# Patient Record
Sex: FEMALE | Race: BLACK OR AFRICAN AMERICAN | NOT HISPANIC OR LATINO
[De-identification: names, ages, dates, MRNs, and addresses within clinical notes are randomized per-mention and may not be internally consistent; named-entity substitution may affect disease eponyms.]

---

## 2020-09-03 ENCOUNTER — APPOINTMENT (OUTPATIENT)
Dept: PULMONOLOGY | Facility: CLINIC | Age: 80
End: 2020-09-03
Payer: COMMERCIAL

## 2020-09-03 VITALS
OXYGEN SATURATION: 100 % | TEMPERATURE: 98.6 F | HEART RATE: 111 BPM | RESPIRATION RATE: 16 BRPM | SYSTOLIC BLOOD PRESSURE: 120 MMHG | DIASTOLIC BLOOD PRESSURE: 80 MMHG

## 2020-09-03 PROBLEM — Z00.00 ENCOUNTER FOR PREVENTIVE HEALTH EXAMINATION: Status: ACTIVE | Noted: 2020-09-03

## 2020-09-03 PROCEDURE — 94060 EVALUATION OF WHEEZING: CPT

## 2020-09-03 PROCEDURE — 94727 GAS DIL/WSHOT DETER LNG VOL: CPT

## 2020-09-03 PROCEDURE — 99204 OFFICE O/P NEW MOD 45 MIN: CPT | Mod: 25

## 2020-09-03 PROCEDURE — 94729 DIFFUSING CAPACITY: CPT

## 2020-09-04 RX ORDER — LOSARTAN POTASSIUM 100 MG/1
100 TABLET, FILM COATED ORAL
Refills: 0 | Status: ACTIVE | COMMUNITY

## 2020-09-04 RX ORDER — INSULIN ASPART 100 [IU]/ML
100 INJECTION, SOLUTION INTRAVENOUS; SUBCUTANEOUS
Refills: 0 | Status: ACTIVE | COMMUNITY

## 2020-09-04 RX ORDER — SODIUM BICARBONATE 650 MG/1
TABLET ORAL
Refills: 0 | Status: ACTIVE | COMMUNITY

## 2020-09-04 RX ORDER — ISOSORBIDE MONONITRATE 60 MG/1
60 TABLET, EXTENDED RELEASE ORAL
Refills: 0 | Status: ACTIVE | COMMUNITY

## 2020-09-04 RX ORDER — ALBUTEROL SULFATE 90 UG/1
108 (90 BASE) AEROSOL, METERED RESPIRATORY (INHALATION)
Refills: 0 | Status: ACTIVE | COMMUNITY

## 2020-09-04 RX ORDER — HYDRALAZINE HYDROCHLORIDE 50 MG/1
50 TABLET ORAL
Refills: 0 | Status: ACTIVE | COMMUNITY

## 2020-09-04 RX ORDER — CALCITRIOL 0.25 UG/1
0.25 CAPSULE, LIQUID FILLED ORAL
Refills: 0 | Status: ACTIVE | COMMUNITY

## 2020-09-04 NOTE — HISTORY OF PRESENT ILLNESS
[Never] : never [TextBox_4] : She is an 80 year old woman with  a history of hypertension, hyperlipidemia, diabetes, ESRD started on HD in July of 2020 and severe aortic stenosis. She is [employer]. \par \par She was admitted to [facility] on 7/23/20 for shortness of breath and renal failure. She was placed on hemodialysis. Echo showed and EF of 55% and severe aortic stenosis. FAUSTINA was being planned. \par \par Presented on 9/3/20 with a cough. At times she produces phlegm. No hemoptysis. No fever, chills or sweats. No sick contacts. Was tested for COVID -19 and was found to be negative. No prior history of pulmonary disease. Never smoked. \par .\par

## 2020-09-04 NOTE — DISCUSSION/SUMMARY
[FreeTextEntry1] : She is an 80 year old woman with  a history of hypertension, hyperlipidemia, diabetes, ESRD started on HD in July of 2020 and severe aortic stenosis. She is Episcopal. She was admitted to [facility] on 7/23/20 for shortness of breath and renal failure. She was placed on hemodialysis. Echo showed and EF of 55% and severe aortic stenosis. FAUSTINA was being planned. \par \par She presented on 9/3/20 with a chronic cough. At times she produces phlegm. No hemoptysis. No fever, chills or sweats. No sick contacts. Was tested for COVID -19 and was found to be negative. No prior history of pulmonary disease. Never smoked. PFT 9/3/20: Spirometry was normal. Moderate restriction. Mild response to bronchodilator noted. CT Chest 7/29/20: Mild global cardiomegaly. Lung parenchyma was clear. No effusions.\par \par There is no evident primary pulmonary process. Her cough could be due to mild increased extra vascular lung water as a consequence to the ESRD and the severe AS. She was advised to continue with the hemodialysis as planned. I will try to get the imaging from [facility]. \par \par Follow up in one month.

## 2020-09-04 NOTE — REVIEW OF SYSTEMS
[Cough] : cough [Anemia] : anemia [Diabetes] : diabetes [Fever] : no fever [Chills] : no chills [Postnasal Drip] : no postnasal drip [Hemoptysis] : no hemoptysis [Sputum] : no sputum [Chest Discomfort] : no chest discomfort [Dyspnea] : no dyspnea [GERD] : no gerd [Arthralgias] : no arthralgias [Rash] : no rash [Seizures] : no seizures [Depression] : no depression

## 2020-09-04 NOTE — PROCEDURE
[FreeTextEntry1] : CT Chest 7/29/20: Mild global cardiomegaly. Lung parenchyma was clear. No effusions. \par \par PFT 9/3/20: Spirometry was normal. Moderate restriction. Mild response to bronchodilator noted.

## 2020-09-04 NOTE — PHYSICAL EXAM
[No Acute Distress] : no acute distress [Normal S1, S2] : normal s1, s2 [No Neck Mass] : no neck mass [No Resp Distress] : no resp distress [Clear to Auscultation Bilaterally] : clear to auscultation bilaterally [No HSM] : no hsm [No Edema] : no edema [Normal Color/ Pigmentation] : normal color/ pigmentation [Oriented x3] : oriented x3

## 2020-09-18 ENCOUNTER — INPATIENT (INPATIENT)
Facility: HOSPITAL | Age: 80
LOS: 5 days | Discharge: HOME | End: 2020-09-24
Attending: INTERNAL MEDICINE | Admitting: INTERNAL MEDICINE
Payer: MEDICARE

## 2020-09-18 VITALS
RESPIRATION RATE: 16 BRPM | DIASTOLIC BLOOD PRESSURE: 61 MMHG | TEMPERATURE: 98 F | SYSTOLIC BLOOD PRESSURE: 132 MMHG | OXYGEN SATURATION: 100 % | HEART RATE: 69 BPM

## 2020-09-18 NOTE — ED PROVIDER NOTE - NS ED ROS FT
General: denies fever, chills  HENT: denies nasal congestion, sore throat, rhinorrhea  Eyes: denies vision changes  CV: denies chest pain  Resp: denies difficulty breathing, cough  Abdominal: denies nausea, vomiting, diarrhea, abdominal pain, blood in stool, dark stool  : denies pain with urination  MSK: +syncope  Neuro: denies headaches, numbness, tingling, dizziness, lightheadedness.  Skin: denies new rashes  Endocrine: denies recent weight loss

## 2020-09-18 NOTE — ED ADULT NURSE NOTE - CHPI ED NUR SYMPTOMS NEG
no tingling/no decreased eating/drinking/no dizziness/no pain/no chills/no fever/no nausea/no vomiting

## 2020-09-18 NOTE — ED PROVIDER NOTE - PROGRESS NOTE DETAILS
JOAN: I was signed out this pt pending Labs and imaging for this 81 yo F presenting with syncope. No complaints now other than hunger, able to tolerate a sandwich. Fingerstick 140s. Labs shows Cr 4.45, Trop elevated but no active chest pain, most likely from poor kidney function. Pt has slight murmur on cardiac exam, possible cause of her syncopal episode today. CT shows only scalp hematoma most likely from fall, no Fx or ICH seen per rads. Will admit to Tele[facility] for ECHO and eval of her Aortic Stenosis. Pt amenable to plan. JOAN: I was signed out this pt pending Labs and imaging for this 79 yo F presenting with syncope. No complaints now other than hunger, able to tolerate a sandwich. Fingerstick 140s. Labs shows Cr 4.45, Trop elevated but no active chest pain, most likely from poor kidney function. Pt has slight murmur on cardiac exam, possible cause of her syncopal episode today. CT shows only scalp hematoma most likely from fall, no Fx or ICH seen per rads. Will admit to Tele[facility] for ECHO and eval of her Aortic Stenosis. Pt amenable to plan.  Per Daughter Mrs Villalba @ [phone] and she reports pt was seen by her Cardiologist last week, unsure if she has had ECHO recently. Dr GOMEZ (Unsure full first name) He at [facility] in [place].

## 2020-09-18 NOTE — ED ADULT NURSE NOTE - NSIMPLEMENTINTERV_GEN_ALL_ED
Implemented All Fall Risk Interventions:  [place] to call system. Call bell, personal items and telephone within reach. Instruct patient to call for assistance. Room bathroom lighting operational. Non-slip footwear when patient is off stretcher. Physically safe environment: no spills, clutter or unnecessary equipment. Stretcher in lowest position, wheels locked, appropriate side rails in place. Provide visual cue, wrist band, yellow gown, etc. Monitor gait and stability. Monitor for mental status changes and reorient to person, place, and time. Review medications for side effects contributing to fall risk. Reinforce activity limits and safety measures with patient and family.

## 2020-09-18 NOTE — ED PROVIDER NOTE - PHYSICAL EXAMINATION
CONSTITUTIONAL: Nontoxic, well nourished, well developed, elderly female, resting comfortably in no acute distress  HEAD: Normocephalic; atraumatic  EYES: Normal inspection, EOMI  ENMT: External appears normal; normal oropharynx  NECK: Supple; non-tender; no cervical lymphadenopathy  CARD: RRR; no audible murmurs, rubs, or gallops  RESP: No respiratory distress, lungs ctab/l  ABD: Soft, non-distended; non-tender; no rebound or guarding  EXT: No LE pitting edema or calf tenderness; distal pulses intact with good capillary refill  SKIN: Warm, dry, intact, R chest port site clean and dry  MSK: no midline TTP, no step offs, no deformities or tenderness  NEURO: aaox3, CN II-IX intact, 5/5 strength b/l UE and LE, sensation intact in all extremities, no pronator drift, finger to nose intact

## 2020-09-18 NOTE — ED ADULT NURSE NOTE - OBJECTIVE STATEMENT
elina RN: pt received to room 4. reports syncopal episode around 8 pm. states was in the bathroom sitting on the toilet and then woke up on the floor. not witnessed. denies any headache or dizziness. unsure if was dizzy prior to falling. received dialysis today ([facility]). left arm has maturing fistula. right chest shiley used for dialysis today. 2 RNs unable to obtain iv access, md made aware, states will attempt with ultrasound guide.

## 2020-09-18 NOTE — ED PROVIDER NOTE - ATTENDING CONTRIBUTION TO CARE
alert oriented s/p unwitnessed syncopal episode in bathroom daughter heard fall and found her on floor  went to wash hands and woke up on floor   denies any injury or pain  hx ESRD with HD right chest port DM2  AS  syncope

## 2020-09-18 NOTE — ED PROVIDER NOTE - CLINICAL SUMMARY MEDICAL DECISION MAKING FREE TEXT BOX
Juana Mcelroy MD: 79yo F with PMH of ESRD on HD with R chest port since 1 month ago (MWF), DM, AS who presents with syncope at 8PM with no prodrome with EKG changes significant for TWI in inferior leads concerning for cardiogenic cause of syncope. No obvious trauma. Will get labs, CT head and neck to r/o stroke or bleed, likely admit to tele for syncope workup

## 2020-09-18 NOTE — ED PROVIDER NOTE - OBJECTIVE STATEMENT
Juana Mcelroy MD: 79yo F with PMH of ESRD on HD with R chest port since 1 month ago (MWF), DM, AS who presents with syncope at 8PM. Pt states she was in the bathroom when she suddenly lost consciousness and was on the ground. No prodrome reported. Unwitnessed. No AC use. No pain or deformities. Reports generalized weakness since starting dialysis 1 month ago. Pt received HD today. No fever, chills, N/V/D, abd pain, dysuria, melena, hematochezia, CP, SOB.

## 2020-09-18 NOTE — ED PROVIDER NOTE - CARE PLAN
Principal Discharge DX:	Syncope and collapse  Secondary Diagnosis:	Scalp hematoma, initial encounter

## 2020-09-18 NOTE — ED ADULT TRIAGE NOTE - CHIEF COMPLAINT QUOTE
alert oriented s/p unwitnessed syncopal episode in bathroom daughter heard fall and found her on floor  went to wash hands and woke up on floor   denies any injury or pain  hx ESRD with HD right chest port DM2  AS

## 2020-09-19 DIAGNOSIS — M10.9 GOUT, UNSPECIFIED: ICD-10-CM

## 2020-09-19 DIAGNOSIS — Z79.899 OTHER LONG TERM (CURRENT) DRUG THERAPY: ICD-10-CM

## 2020-09-19 DIAGNOSIS — R55 SYNCOPE AND COLLAPSE: ICD-10-CM

## 2020-09-19 DIAGNOSIS — D64.9 ANEMIA, UNSPECIFIED: ICD-10-CM

## 2020-09-19 DIAGNOSIS — I10 ESSENTIAL (PRIMARY) HYPERTENSION: ICD-10-CM

## 2020-09-19 DIAGNOSIS — N18.6 END STAGE RENAL DISEASE: ICD-10-CM

## 2020-09-19 DIAGNOSIS — Z90.710 ACQUIRED ABSENCE OF BOTH CERVIX AND UTERUS: Chronic | ICD-10-CM

## 2020-09-19 DIAGNOSIS — E11.22 TYPE 2 DIABETES MELLITUS WITH DIABETIC CHRONIC KIDNEY DISEASE: ICD-10-CM

## 2020-09-19 DIAGNOSIS — Z29.9 ENCOUNTER FOR PROPHYLACTIC MEASURES, UNSPECIFIED: ICD-10-CM

## 2020-09-19 DIAGNOSIS — I77.0 ARTERIOVENOUS FISTULA, ACQUIRED: Chronic | ICD-10-CM

## 2020-09-19 DIAGNOSIS — I35.0 NONRHEUMATIC AORTIC (VALVE) STENOSIS: ICD-10-CM

## 2020-09-19 LAB
ALBUMIN SERPL ELPH-MCNC: 4.2 G/DL — SIGNIFICANT CHANGE UP (ref 3.3–5)
ALP SERPL-CCNC: 90 U/L — SIGNIFICANT CHANGE UP (ref 40–120)
ALT FLD-CCNC: 8 U/L — SIGNIFICANT CHANGE UP (ref 4–33)
ANION GAP SERPL CALC-SCNC: 15 MMO/L — HIGH (ref 7–14)
ANION GAP SERPL CALC-SCNC: 17 MMO/L — HIGH (ref 7–14)
ANISOCYTOSIS BLD QL: SIGNIFICANT CHANGE UP
APTT BLD: 35 SEC — SIGNIFICANT CHANGE UP (ref 27–36.3)
AST SERPL-CCNC: 12 U/L — SIGNIFICANT CHANGE UP (ref 4–32)
BASE EXCESS BLDV CALC-SCNC: 7.6 MMOL/L — SIGNIFICANT CHANGE UP
BASOPHILS # BLD AUTO: 0.04 K/UL — SIGNIFICANT CHANGE UP (ref 0–0.2)
BASOPHILS # BLD AUTO: 0.06 K/UL — SIGNIFICANT CHANGE UP (ref 0–0.2)
BASOPHILS NFR BLD AUTO: 0.4 % — SIGNIFICANT CHANGE UP (ref 0–2)
BASOPHILS NFR BLD AUTO: 0.5 % — SIGNIFICANT CHANGE UP (ref 0–2)
BASOPHILS NFR SPEC: 0.9 % — SIGNIFICANT CHANGE UP (ref 0–2)
BILIRUB SERPL-MCNC: 0.4 MG/DL — SIGNIFICANT CHANGE UP (ref 0.2–1.2)
BLASTS # FLD: 0 % — SIGNIFICANT CHANGE UP (ref 0–0)
BLOOD GAS VENOUS - CREATININE: 5.75 MG/DL — HIGH (ref 0.5–1.3)
BLOOD GAS VENOUS - FIO2: 21 — SIGNIFICANT CHANGE UP
BUN SERPL-MCNC: 21 MG/DL — SIGNIFICANT CHANGE UP (ref 7–23)
BUN SERPL-MCNC: 27 MG/DL — HIGH (ref 7–23)
CALCIUM SERPL-MCNC: 9 MG/DL — SIGNIFICANT CHANGE UP (ref 8.4–10.5)
CALCIUM SERPL-MCNC: 9.5 MG/DL — SIGNIFICANT CHANGE UP (ref 8.4–10.5)
CHLORIDE BLDV-SCNC: 98 MMOL/L — SIGNIFICANT CHANGE UP (ref 96–108)
CHLORIDE SERPL-SCNC: 92 MMOL/L — LOW (ref 98–107)
CHLORIDE SERPL-SCNC: 92 MMOL/L — LOW (ref 98–107)
CK MB BLD-MCNC: 1.46 NG/ML — SIGNIFICANT CHANGE UP (ref 1–4.7)
CK MB BLD-MCNC: 1.63 NG/ML — SIGNIFICANT CHANGE UP (ref 1–4.7)
CK MB BLD-MCNC: SIGNIFICANT CHANGE UP (ref 0–2.5)
CK SERPL-CCNC: 58 U/L — SIGNIFICANT CHANGE UP (ref 25–170)
CK SERPL-CCNC: 63 U/L — SIGNIFICANT CHANGE UP (ref 25–170)
CO2 SERPL-SCNC: 27 MMOL/L — SIGNIFICANT CHANGE UP (ref 22–31)
CO2 SERPL-SCNC: 29 MMOL/L — SIGNIFICANT CHANGE UP (ref 22–31)
CREAT SERPL-MCNC: 4.45 MG/DL — HIGH (ref 0.5–1.3)
CREAT SERPL-MCNC: 5.05 MG/DL — HIGH (ref 0.5–1.3)
DACRYOCYTES BLD QL SMEAR: SLIGHT — SIGNIFICANT CHANGE UP
EOSINOPHIL # BLD AUTO: 0.16 K/UL — SIGNIFICANT CHANGE UP (ref 0–0.5)
EOSINOPHIL # BLD AUTO: 0.2 K/UL — SIGNIFICANT CHANGE UP (ref 0–0.5)
EOSINOPHIL NFR BLD AUTO: 1.4 % — SIGNIFICANT CHANGE UP (ref 0–6)
EOSINOPHIL NFR BLD AUTO: 1.8 % — SIGNIFICANT CHANGE UP (ref 0–6)
EOSINOPHIL NFR FLD: 0.8 % — SIGNIFICANT CHANGE UP (ref 0–6)
GAS PNL BLDV: 134 MMOL/L — LOW (ref 136–146)
GIANT PLATELETS BLD QL SMEAR: PRESENT — SIGNIFICANT CHANGE UP
GLUCOSE BLDC GLUCOMTR-MCNC: 119 MG/DL — HIGH (ref 70–99)
GLUCOSE BLDC GLUCOMTR-MCNC: 146 MG/DL — HIGH (ref 70–99)
GLUCOSE BLDC GLUCOMTR-MCNC: 156 MG/DL — HIGH (ref 70–99)
GLUCOSE BLDC GLUCOMTR-MCNC: 161 MG/DL — HIGH (ref 70–99)
GLUCOSE BLDV-MCNC: 156 MG/DL — HIGH (ref 70–99)
GLUCOSE SERPL-MCNC: 161 MG/DL — HIGH (ref 70–99)
GLUCOSE SERPL-MCNC: 171 MG/DL — HIGH (ref 70–99)
HBA1C BLD-MCNC: 5.4 % — SIGNIFICANT CHANGE UP (ref 4–5.6)
HCO3 BLDV-SCNC: 30 MMOL/L — HIGH (ref 20–27)
HCT VFR BLD CALC: 33.1 % — LOW (ref 34.5–45)
HCT VFR BLD CALC: 36.2 % — SIGNIFICANT CHANGE UP (ref 34.5–45)
HCT VFR BLDV CALC: 33 % — LOW (ref 34.5–45)
HGB BLD-MCNC: 10 G/DL — LOW (ref 11.5–15.5)
HGB BLD-MCNC: 11 G/DL — LOW (ref 11.5–15.5)
HGB BLDV-MCNC: 10.7 G/DL — LOW (ref 11.5–15.5)
IMM GRANULOCYTES NFR BLD AUTO: 0.5 % — SIGNIFICANT CHANGE UP (ref 0–1.5)
IMM GRANULOCYTES NFR BLD AUTO: 0.6 % — SIGNIFICANT CHANGE UP (ref 0–1.5)
INR BLD: 1.07 — SIGNIFICANT CHANGE UP (ref 0.88–1.16)
LACTATE BLDV-MCNC: 2.8 MMOL/L — HIGH (ref 0.5–2)
LYMPHOCYTES # BLD AUTO: 1.62 K/UL — SIGNIFICANT CHANGE UP (ref 1–3.3)
LYMPHOCYTES # BLD AUTO: 13.7 % — SIGNIFICANT CHANGE UP (ref 13–44)
LYMPHOCYTES # BLD AUTO: 19.2 % — SIGNIFICANT CHANGE UP (ref 13–44)
LYMPHOCYTES # BLD AUTO: 2.08 K/UL — SIGNIFICANT CHANGE UP (ref 1–3.3)
LYMPHOCYTES NFR SPEC AUTO: 17.9 % — SIGNIFICANT CHANGE UP (ref 13–44)
MACROCYTES BLD QL: SIGNIFICANT CHANGE UP
MAGNESIUM SERPL-MCNC: 2.1 MG/DL — SIGNIFICANT CHANGE UP (ref 1.6–2.6)
MCHC RBC-ENTMCNC: 30.2 % — LOW (ref 32–36)
MCHC RBC-ENTMCNC: 30.4 % — LOW (ref 32–36)
MCHC RBC-ENTMCNC: 32.1 PG — SIGNIFICANT CHANGE UP (ref 27–34)
MCHC RBC-ENTMCNC: 32.4 PG — SIGNIFICANT CHANGE UP (ref 27–34)
MCV RBC AUTO: 106.1 FL — HIGH (ref 80–100)
MCV RBC AUTO: 106.5 FL — HIGH (ref 80–100)
METAMYELOCYTES # FLD: 0 % — SIGNIFICANT CHANGE UP (ref 0–1)
MONOCYTES # BLD AUTO: 0.77 K/UL — SIGNIFICANT CHANGE UP (ref 0–0.9)
MONOCYTES # BLD AUTO: 0.82 K/UL — SIGNIFICANT CHANGE UP (ref 0–0.9)
MONOCYTES NFR BLD AUTO: 6.5 % — SIGNIFICANT CHANGE UP (ref 2–14)
MONOCYTES NFR BLD AUTO: 7.6 % — SIGNIFICANT CHANGE UP (ref 2–14)
MONOCYTES NFR BLD: 4.3 % — SIGNIFICANT CHANGE UP (ref 2–9)
MYELOCYTES NFR BLD: 0 % — SIGNIFICANT CHANGE UP (ref 0–0)
NEUTROPHIL AB SER-ACNC: 73.5 % — SIGNIFICANT CHANGE UP (ref 43–77)
NEUTROPHILS # BLD AUTO: 7.63 K/UL — HIGH (ref 1.8–7.4)
NEUTROPHILS # BLD AUTO: 9.16 K/UL — HIGH (ref 1.8–7.4)
NEUTROPHILS NFR BLD AUTO: 70.5 % — SIGNIFICANT CHANGE UP (ref 43–77)
NEUTROPHILS NFR BLD AUTO: 77.3 % — HIGH (ref 43–77)
NEUTS BAND # BLD: 0.9 % — SIGNIFICANT CHANGE UP (ref 0–6)
NRBC # BLD: 3 /100WBC — SIGNIFICANT CHANGE UP
NRBC # FLD: 0.1 K/UL — SIGNIFICANT CHANGE UP (ref 0–0)
NRBC # FLD: 0.14 K/UL — SIGNIFICANT CHANGE UP (ref 0–0)
NRBC FLD-RTO: 1.3 — SIGNIFICANT CHANGE UP
NT-PROBNP SERPL-SCNC: 2804 PG/ML — SIGNIFICANT CHANGE UP
OTHER - HEMATOLOGY %: 0 — SIGNIFICANT CHANGE UP
OVALOCYTES BLD QL SMEAR: SLIGHT — SIGNIFICANT CHANGE UP
PCO2 BLDV: 55 MMHG — HIGH (ref 41–51)
PH BLDV: 7.39 PH — SIGNIFICANT CHANGE UP (ref 7.32–7.43)
PHOSPHATE SERPL-MCNC: 3.9 MG/DL — SIGNIFICANT CHANGE UP (ref 2.5–4.5)
PLATELET # BLD AUTO: 101 K/UL — LOW (ref 150–400)
PLATELET # BLD AUTO: 117 K/UL — LOW (ref 150–400)
PLATELET COUNT - ESTIMATE: SIGNIFICANT CHANGE UP
PMV BLD: 11.4 FL — SIGNIFICANT CHANGE UP (ref 7–13)
PMV BLD: 11.8 FL — SIGNIFICANT CHANGE UP (ref 7–13)
PO2 BLDV: 29 MMHG — LOW (ref 35–40)
POIKILOCYTOSIS BLD QL AUTO: SLIGHT — SIGNIFICANT CHANGE UP
POLYCHROMASIA BLD QL SMEAR: SLIGHT — SIGNIFICANT CHANGE UP
POTASSIUM BLDV-SCNC: 3.8 MMOL/L — SIGNIFICANT CHANGE UP (ref 3.4–4.5)
POTASSIUM SERPL-MCNC: 3.9 MMOL/L — SIGNIFICANT CHANGE UP (ref 3.5–5.3)
POTASSIUM SERPL-MCNC: 4 MMOL/L — SIGNIFICANT CHANGE UP (ref 3.5–5.3)
POTASSIUM SERPL-SCNC: 3.9 MMOL/L — SIGNIFICANT CHANGE UP (ref 3.5–5.3)
POTASSIUM SERPL-SCNC: 4 MMOL/L — SIGNIFICANT CHANGE UP (ref 3.5–5.3)
PROMYELOCYTES # FLD: 0 % — SIGNIFICANT CHANGE UP (ref 0–0)
PROT SERPL-MCNC: 6.7 G/DL — SIGNIFICANT CHANGE UP (ref 6–8.3)
PROTHROM AB SERPL-ACNC: 12.3 SEC — SIGNIFICANT CHANGE UP (ref 10.6–13.6)
RBC # BLD: 3.12 M/UL — LOW (ref 3.8–5.2)
RBC # BLD: 3.4 M/UL — LOW (ref 3.8–5.2)
RBC # FLD: 18.2 % — HIGH (ref 10.3–14.5)
RBC # FLD: 18.4 % — HIGH (ref 10.3–14.5)
SAO2 % BLDV: 41.5 % — LOW (ref 60–85)
SARS-COV-2 IGG SERPL QL IA: NEGATIVE — SIGNIFICANT CHANGE UP
SARS-COV-2 IGM SERPL IA-ACNC: 0.09 INDEX — SIGNIFICANT CHANGE UP
SARS-COV-2 RNA SPEC QL NAA+PROBE: SIGNIFICANT CHANGE UP
SODIUM SERPL-SCNC: 134 MMOL/L — LOW (ref 135–145)
SODIUM SERPL-SCNC: 138 MMOL/L — SIGNIFICANT CHANGE UP (ref 135–145)
TROPONIN T, HIGH SENSITIVITY: 65 NG/L — CRITICAL HIGH (ref ?–14)
TROPONIN T, HIGH SENSITIVITY: 74 NG/L — CRITICAL HIGH (ref ?–14)
TSH SERPL-MCNC: 0.87 UIU/ML — SIGNIFICANT CHANGE UP (ref 0.27–4.2)
VARIANT LYMPHS # BLD: 1.7 % — SIGNIFICANT CHANGE UP
WBC # BLD: 10.82 K/UL — HIGH (ref 3.8–10.5)
WBC # BLD: 11.84 K/UL — HIGH (ref 3.8–10.5)
WBC # FLD AUTO: 10.82 K/UL — HIGH (ref 3.8–10.5)
WBC # FLD AUTO: 11.84 K/UL — HIGH (ref 3.8–10.5)

## 2020-09-19 PROCEDURE — 93010 ELECTROCARDIOGRAM REPORT: CPT

## 2020-09-19 PROCEDURE — 72125 CT NECK SPINE W/O DYE: CPT | Mod: 26

## 2020-09-19 PROCEDURE — 71045 X-RAY EXAM CHEST 1 VIEW: CPT | Mod: 26

## 2020-09-19 PROCEDURE — 70450 CT HEAD/BRAIN W/O DYE: CPT | Mod: 26

## 2020-09-19 PROCEDURE — 99223 1ST HOSP IP/OBS HIGH 75: CPT | Mod: GC

## 2020-09-19 PROCEDURE — 99285 EMERGENCY DEPT VISIT HI MDM: CPT

## 2020-09-19 RX ORDER — GLUCAGON INJECTION, SOLUTION 0.5 MG/.1ML
1 INJECTION, SOLUTION SUBCUTANEOUS ONCE
Refills: 0 | Status: DISCONTINUED | OUTPATIENT
Start: 2020-09-19 | End: 2020-09-24

## 2020-09-19 RX ORDER — HEPARIN SODIUM 5000 [USP'U]/ML
5000 INJECTION INTRAVENOUS; SUBCUTANEOUS EVERY 8 HOURS
Refills: 0 | Status: DISCONTINUED | OUTPATIENT
Start: 2020-09-19 | End: 2020-09-23

## 2020-09-19 RX ORDER — DEXTROSE 50 % IN WATER 50 %
25 SYRINGE (ML) INTRAVENOUS ONCE
Refills: 0 | Status: DISCONTINUED | OUTPATIENT
Start: 2020-09-19 | End: 2020-09-24

## 2020-09-19 RX ORDER — DEXTROSE 50 % IN WATER 50 %
12.5 SYRINGE (ML) INTRAVENOUS ONCE
Refills: 0 | Status: DISCONTINUED | OUTPATIENT
Start: 2020-09-19 | End: 2020-09-24

## 2020-09-19 RX ORDER — DEXTROSE 50 % IN WATER 50 %
15 SYRINGE (ML) INTRAVENOUS ONCE
Refills: 0 | Status: DISCONTINUED | OUTPATIENT
Start: 2020-09-19 | End: 2020-09-24

## 2020-09-19 RX ORDER — INSULIN LISPRO 100/ML
VIAL (ML) SUBCUTANEOUS
Refills: 0 | Status: DISCONTINUED | OUTPATIENT
Start: 2020-09-19 | End: 2020-09-24

## 2020-09-19 RX ORDER — INSULIN LISPRO 100/ML
VIAL (ML) SUBCUTANEOUS AT BEDTIME
Refills: 0 | Status: DISCONTINUED | OUTPATIENT
Start: 2020-09-19 | End: 2020-09-24

## 2020-09-19 RX ORDER — INFLUENZA VIRUS VACCINE 15; 15; 15; 15 UG/.5ML; UG/.5ML; UG/.5ML; UG/.5ML
0.5 SUSPENSION INTRAMUSCULAR ONCE
Refills: 0 | Status: DISCONTINUED | OUTPATIENT
Start: 2020-09-19 | End: 2020-09-24

## 2020-09-19 RX ORDER — SODIUM CHLORIDE 9 MG/ML
1000 INJECTION, SOLUTION INTRAVENOUS
Refills: 0 | Status: DISCONTINUED | OUTPATIENT
Start: 2020-09-19 | End: 2020-09-24

## 2020-09-19 RX ADMIN — HEPARIN SODIUM 5000 UNIT(S): 5000 INJECTION INTRAVENOUS; SUBCUTANEOUS at 21:37

## 2020-09-19 RX ADMIN — Medication 1: at 18:28

## 2020-09-19 RX ADMIN — HEPARIN SODIUM 5000 UNIT(S): 5000 INJECTION INTRAVENOUS; SUBCUTANEOUS at 13:47

## 2020-09-19 NOTE — H&P ADULT - NSHPLABSRESULTS_GEN_ALL_CORE
EKG personally reviewed.  NSR at 71 bpm. TWI in II, III, and aVF. QTc 456 ms.    Imaging personally reviewed. EKG personally reviewed.  NSR at 71 bpm. TWI in II, III, and aVF. QTc 456 ms.    Imaging personally reviewed.  CTH and CT C-spine:  BRAIN:  There is no intracranial hemorrhage, mass effect, midline shift or large acute cortical infarct.    There are age-appropriate involutional changes with commensurate dilatation of the CSF spaces. Mild white matter lucencies likely represent microvascular ischemic disease.    There is a mild left frontoparietal scalp hematoma.  There is no depressed skull fracture.  The mastoid air cells and visualized paranasal sinuses are well aerated.  The orbits are unremarkable.    CERVICAL SPINE:  There is no acute displaced fracture or traumatic malalignment.  There isno prevertebral soft tissue swelling.    There is maintenance of the cervical lordosis.  Vertebral heights are grossly maintained. Moderate disc space narrowing between C4-C6. Small anterior and posterior osteophytes. Mild uncinate and facet hypertrophy results in mild multilevel bilateral neuroforaminal narrowing.    IMPRESSION:    BRAIN: No intracranial hemorrhage, mass effect or depressed skull fracture. Mild left frontoparietal scalp hematoma.  CERVICAL SPINE: No displaced fracture or traumatic malalignment. Mild chronic degenerative changes.    CXR with no focal consolidations and no pleural effusions.

## 2020-09-19 NOTE — H&P ADULT - NSHPREVIEWOFSYSTEMS_GEN_ALL_CORE
Constitutional: No generalized weakness, fevers, chills, or weight loss  Eyes: No visual changes, double vision, or eye pain  Ears, Nose, Mouth, Throat: No runny nose, sinus pain, ear pain, tinnitus, sore throat, dysphagia, or odynophagia  Cardiovascular: No chest pain, palpitations, or LE edema  Respiratory: No cough, wheezing, hemoptysis, or shortness of breath  Gastrointestinal: No abdominal pain, dysphagia, anorexia, nausea/vomiting, diarrhea/constipation, hematemesis, melena, or BRBPR  Genitourinary: No dysuria, frequency, urgency, or hematuria  Musculoskeletal: No joint pain, joint swelling, or decreased ROM  Skin: No pruritus, rashes, lesions, or wounds  Neurologic:  No seizures, headache, paresthesias, numbness, or limb weakness  Psychiatric: No depression, anxiety, difficulty concentrating, anhedonia, or lack of energy  Endocrine: No heat/cold intolerance, mood swings, sweats, polydipsia, or polyuria  Hematologic/lymphatic: No purpura, petechia, or prolonged or excessive bleeding after dental extraction / injury  Allergic/Immunologic: No anaphylaxis or allergic response to materials, foods, animals    Positives and pertinent negatives noted and all other systems negative. Constitutional: +Chills. No generalized weakness, fevers, or weight loss.  Eyes: No visual changes, double vision, or eye pain  Ears, Nose, Mouth, Throat: No runny nose, sinus pain, ear pain, tinnitus, sore throat, dysphagia, or odynophagia  Cardiovascular: +Syncope. No chest pain, palpitations, or LE edema.  Respiratory: No cough, wheezing, hemoptysis, or shortness of breath  Gastrointestinal: No abdominal pain, nausea/vomiting, diarrhea/constipation, hematemesis, melena, or BRBPR  Genitourinary: +Oliguric. No dysuria or hematuria.  Musculoskeletal: No back pain, joint pain, joint swelling, or decreased ROM  Skin: No pruritus or rashes  Neurologic: No seizures, headache, paresthesias, numbness, or limb weakness  Psychiatric: No depression, anxiety, or agitation  Endocrine: No heat/cold intolerance, mood swings, sweats, polydipsia, or polyuria  Hematologic/lymphatic: No purpura, petechia, or prolonged or excessive bleeding injury  Allergic/Immunologic: No anaphylaxis or allergic response to materials, foods, animals    Positives and pertinent negatives noted and all other systems negative.

## 2020-09-19 NOTE — H&P ADULT - PROBLEM SELECTOR PLAN 8
- DVT ppx: HSQ  - Diet: Renal restrictions  - PT consult Pt does not know her current home meds.   - Will need to call pt's daughter (Azra: [phone] (C), [phone] (H)) during the day, as she has a list of pt's home meds. Attempts early Saturday morning to reach pt's daughter unsuccessful.

## 2020-09-19 NOTE — H&P ADULT - PROBLEM SELECTOR PLAN 3
On HD MWF. Last HD on Friday, completed full session. Currently, no indication for urgent HD.  - Renal consult to be called in AM ([facility] [place])  - Monitor electrolytes - serum K, Ca, Phos  - C/w sodium bicarb 650 mg tid and Phoslo 667 mg tid  - F/u PTH level. C/w calcitriol on HD days. On HD MWF. Last HD on Friday, completed full session. Currently, no indication for urgent HD.  - Renal consult to be called in AM ([facility] [place])  - Monitor electrolytes - serum K, Ca, Phos  - C/w sodium bicarb and Phoslo - will need to verify home doses  - F/u PTH level. C/w calcitriol on HD days - will need to verify home dose.

## 2020-09-19 NOTE — H&P ADULT - NSHPPHYSICALEXAM_GEN_ALL_CORE
Vital Signs Last 24 Hrs  T(C): 36.8 (19 Sep 2020 04:49), Max: 36.8 (18 Sep 2020 21:35)  T(F): 98.2 (19 Sep 2020 04:49), Max: 98.3 (18 Sep 2020 21:35)  HR: 67 (19 Sep 2020 04:49) (67 - 74)  BP: 115/53 (19 Sep 2020 04:49) (115/53 - 132/61)  BP(mean): --  RR: 18 (19 Sep 2020 04:49) (16 - 22)  SpO2: 98% (19 Sep 2020 04:49) (98% - 100%)    PHYSICAL EXAM:  General: Awake and alert.  No acute distress.  Head: Normocephalic, atraumatic.    Eyes: PERRL.  EOMI.  No scleral icterus.  No conjunctival pallor.  Mouth: Moist MM.  No oropharyngeal exudates.    Neck: Supple.  Full range of motion.  No JVD.  No LAD.  No thyromegaly.  Trachea midline.    Heart: RRR.  Normal S1 and S2.  No murmurs, rubs, or gallops.  No LE edema b/l.   Lungs: Nonlabored breathing.  Good inspiratory effort.  CTAB.  No wheezes, crackles, or rhonchi.    Abdomen: BS+, soft, NT/ND.  No hepatomegaly.   Skin: Warm and dry.  No rashes.  Extremities: No cyanosis.  2+ peripheral pulses b/l.  Musculoskeletal: No joint deformities.  No spinal or paraspinal tenderness.  Neuro: A&Ox3.  CN II-XII intact.  5/5 motor strength in UE and LE b/l.  Tactile sensation intact in UE and LE b/l.  Cerebellar function intact as assessed by finger-to-nose test. Vital Signs Last 24 Hrs  T(C): 36.8 (19 Sep 2020 04:49), Max: 36.8 (18 Sep 2020 21:35)  T(F): 98.2 (19 Sep 2020 04:49), Max: 98.3 (18 Sep 2020 21:35)  HR: 67 (19 Sep 2020 04:49) (67 - 74)  BP: 115/53 (19 Sep 2020 04:49) (115/53 - 132/61)  BP(mean): --  RR: 18 (19 Sep 2020 04:49) (16 - 22)  SpO2: 98% (19 Sep 2020 04:49) (98% - 100%)    PHYSICAL EXAM:  General: Awake and alert.  No acute distress.  Head: Normocephalic, atraumatic.    Eyes: EOMI.  No scleral icterus.  No conjunctival pallor.  Mouth: Moist MM.  No oropharyngeal exudates.    Neck: Supple.  Full range of motion.  No JVD.  No LAD.  No thyromegaly.  Trachea midline.    Heart: RRR.  Normal S1 and S2.  Grade 3/6 systolic ejection murmur, most prominent at RUSB.  No LE edema b/l.   Lungs: Nonlabored breathing.  Good inspiratory effort.  CTAB.  No wheezes, crackles, or rhonchi.    Abdomen: BS+, soft, NT/ND.  No hepatomegaly.   Skin: R chest shiley with no TTP, active drainage, edema, erythema, or warmth.  Warm and dry.  No rashes.  Extremities: LUE AVF with palpable thrill, audible bruit.  No cyanosis.  2+ peripheral pulses b/l.  Musculoskeletal: No joint deformities.  No spinal or paraspinal tenderness.  Neuro: A&Ox3.  CN II-XII intact.  5/5 motor strength in UE and LE b/l.  Tactile sensation intact in UE and LE b/l.  No focal deficits. Vital Signs Last 24 Hrs  T(C): 36.8 (19 Sep 2020 04:49), Max: 36.8 (18 Sep 2020 21:35)  T(F): 98.2 (19 Sep 2020 04:49), Max: 98.3 (18 Sep 2020 21:35)  HR: 67 (19 Sep 2020 04:49) (67 - 74)  BP: 115/53 (19 Sep 2020 04:49) (115/53 - 132/61)  BP(mean): --  RR: 18 (19 Sep 2020 04:49) (16 - 22)  SpO2: 98% (19 Sep 2020 04:49) (98% - 100%)    PHYSICAL EXAM:  General: Awake and alert.  No acute distress.  Head: Normocephalic, atraumatic.  No visible scalp hematoma or TTP.  Eyes: EOMI.  No scleral icterus.  No conjunctival pallor.  Mouth: Moist MM.  No oropharyngeal exudates.    Neck: Supple.  Full range of motion.  No JVD.  No LAD.  No thyromegaly.  Trachea midline.    Heart: RRR.  Normal S1 and S2.  Grade 3/6 systolic ejection murmur, most prominent at RUSB.  No LE edema b/l.   Lungs: Nonlabored breathing.  Good inspiratory effort.  CTAB.  No wheezes, crackles, or rhonchi.    Abdomen: BS+, soft, NT/ND.  No hepatomegaly.   Skin: R chest shiley with no TTP, active drainage, edema, erythema, or warmth.  Warm and dry.  No rashes.  Extremities: LUE AVF with palpable thrill, audible bruit.  No cyanosis.  2+ peripheral pulses b/l.  Musculoskeletal: No joint deformities.  No spinal or paraspinal tenderness.  Neuro: A&Ox3.  CN II-XII intact.  5/5 motor strength in UE and LE b/l.  Tactile sensation intact in UE and LE b/l.  No focal deficits.

## 2020-09-19 NOTE — H&P ADULT - NSICDXPASTSURGICALHX_GEN_ALL_CORE_FT
PAST SURGICAL HISTORY:  No significant past surgical history      PAST SURGICAL HISTORY:  AV fistula     History of hysterectomy

## 2020-09-19 NOTE — H&P ADULT - PROBLEM SELECTOR PLAN 2
Moderate aortic stenosis (LEVI 1.16 cm2 on TTE in 2/2020).   - F/u TTE to evaluate degree of aortic stenosis and for any other structural/valvular abnormalities  - Cardiology consult to be called in AM for possible symptomatic aortic stenosis

## 2020-09-19 NOTE — H&P ADULT - ASSESSMENT
79 yo woman, Amish, with history of ESRD on HD (MWF, via [facility]), DM2 (on insulin), HTN, moderate aortic stenosis (LEVI 1.16 cm2 on TTE in 2/2020), and gout presents following a syncopal episode on Friday evening, admitted for syncopal workup.

## 2020-09-19 NOTE — H&P ADULT - HISTORY OF PRESENT ILLNESS
81 yo woman, Yazdanism, with history of ESRD on HD (MWF, via [facility]), DM2 (on insulin), HTN, moderate aortic stenosis (LEVI 1.16 cm2), and gout presents following a syncopal episode on Friday evening. Pt was in her usual state of health when she went for HD on Friday, completing a full session without any issues. After her session, pt was supposed to take car service back home and waited outside the dialysis center for her ride, feeling cold and developing chills. When pt returned home, she immediately went to lie down in bed to warm herself up under the blankets, but later on, got out of bed to wash up before dinner. Pt states that the last thing she remembers before being found down on the floor of her bathroom was being in the bathroom washing her hands while sitting on the commode for support. Pt's daughter had heard a loud thud coming from the bathroom and found pt passed out on the floor. Pt denies any prodromal symptoms other than feeling chills prior to the syncopal episode. No chest pain, SOB, palpitations, lightheadedness, dizziness, headaches, vision changes, numbness/tingling, weakness, fevers, abdominal pain, nausea, vomiting, diarrhea, dark/bloody stools, or bladder/bowel dysfunction.     Of note, pt had an admission at [facility] in February this year for a similar syncopal episode and was found to have moderate aortic stenosis on TTE at the time.     VS in ED here: T 98.3, HR 69-74, -132/55-61, RR 16-22, O2 sats % RA.   Trops 74 -> 65. EKG with TWI in II, III, and aVF. 79 yo woman, Lutheran, with history of ESRD on HD (MWF, via [facility]), DM2 (on insulin), HTN, moderate aortic stenosis (LEVI 1.16 cm2 on TTE in 2/2020), and gout presents following a syncopal episode on Friday evening. Pt was in her usual state of health when she went for HD on Friday, completing a full session without any issues. After her session, pt was supposed to take car service back home and waited outside the dialysis center for her ride, feeling cold and developing chills. When pt returned home, she immediately went to lie down in bed to warm herself up under the blankets, but later on, got out of bed to wash up before dinner. Pt states that the last thing she remembers before being found down on the floor of her bathroom was being in the bathroom washing her hands while sitting on the commode for support. Pt's daughter had heard a loud thud coming from the bathroom and found pt passed out on the floor. Pt denies any prodromal symptoms other than feeling chills prior to the syncopal episode. No chest pain, SOB, palpitations, lightheadedness, dizziness, headaches, vision changes, numbness/tingling, weakness, fevers, abdominal pain, nausea, vomiting, diarrhea, dark/bloody stools, or bladder/bowel dysfunction.     Of note, pt had an admission at [facility] in February this year for a similar syncopal episode and was found to have moderate aortic stenosis on TTE at the time.     VS in ED here: T 98.3, HR 69-74, -132/55-61, RR 16-22, O2 sats % RA.   Trops 74 -> 65. EKG with TWI in II, III, and aVF.

## 2020-09-19 NOTE — H&P ADULT - PROBLEM SELECTOR PLAN 4
Hgb 11 on admission. No S/S of active bleed. Likely 2/2 ESRD and anemia of chronic disease.   - Monitor H/H (pt is a Judaism)  - C/w ferrous sulfate 325 mg daily  - Check folate and vitamin B12 given MCV of 106.5. TSH wnl.

## 2020-09-19 NOTE — H&P ADULT - PROBLEM SELECTOR PLAN 5
BPs in acceptable range.  - Pt does not know what BP meds she is currently taking at home. Will need to call pt's daughter (Azra: [phone] (C), [phone] (H)) during the day, as she has a list of pt's home meds.

## 2020-09-19 NOTE — H&P ADULT - NSICDXFAMILYHX_GEN_ALL_CORE_FT
FAMILY HISTORY:  Family history of diabetes mellitus  Family history of heart disease  Family history of hypertension

## 2020-09-19 NOTE — H&P ADULT - PROBLEM SELECTOR PLAN 6
On insulin at home. HgbA1c 6.4% in 2/2020.  - Start low-dose ISS and FS checks qAC tid and qhs  - F/u A1c  - Pt does not know her home insulin regimen. Will need to call pt's daughter (Azra: [phone] (C), [phone] (H)) during the day, as she has a list of pt's home meds.

## 2020-09-19 NOTE — H&P ADULT - PROBLEM SELECTOR PLAN 1
Unwitnessed. Per pt, no prodromal symptoms other than feeling chills. Likely in setting of aortic stenosis, possibly worsening.   - Monitor on tele for any concerning arrhythmias  - TTE ordered to evaluate degree of aortic stenosis and for any other structural/valvular abnormalities  - Check orthostatics x2 (in AM and PM)  - EKG with possibly new TWI in II, III, and aVF. However, trops 74 -> 65 in setting of ESRD and CK and CKMB wnl. Pt without any chest pain. Unlikely 2/2 ACS.  - Fall risk protocol  - PT consult  - CTH and CT C-spine negative for any concerning acute pathology, shows mild L frontoparietal scalp hematoma. No visible scalp hematoma or TTP on exam.

## 2020-09-19 NOTE — PATIENT PROFILE ADULT - DO YOU FEEL LIKE HURTING YOURSELF OR OTHERS?
Pt notified clinic that he will need to reschedule today's INR appt but did not have his calender with him at the time of the phone call -- he will call back to reschedule
no

## 2020-09-19 NOTE — H&P ADULT - NSHPSOCIALHISTORY_GEN_ALL_CORE
Tobacco: denies  EtOH: denies  Illicit drugs: denies  Lives with Denies any tobacco, alcohol, or illicit drug use.  Lives with daughter.

## 2020-09-19 NOTE — H&P ADULT - NSICDXPASTMEDICALHX_GEN_ALL_CORE_FT
PAST MEDICAL HISTORY:  Aortic stenosis     DM (diabetes mellitus)     ESRD (end stage renal disease) on dialysis      PAST MEDICAL HISTORY:  Aortic stenosis     Bilateral cataracts     Diabetic retinopathy     DM (diabetes mellitus)     ESRD (end stage renal disease) on dialysis     Gout     Hypertension

## 2020-09-20 LAB
ANION GAP SERPL CALC-SCNC: 21 MMO/L — HIGH (ref 7–14)
APPEARANCE UR: SIGNIFICANT CHANGE UP
BACTERIA # UR AUTO: HIGH
BILIRUB UR-MCNC: NEGATIVE — SIGNIFICANT CHANGE UP
BLOOD UR QL VISUAL: SIGNIFICANT CHANGE UP
BUN SERPL-MCNC: 48 MG/DL — HIGH (ref 7–23)
CALCIUM SERPL-MCNC: 9.1 MG/DL — SIGNIFICANT CHANGE UP (ref 8.4–10.5)
CHLORIDE SERPL-SCNC: 95 MMOL/L — LOW (ref 98–107)
CO2 SERPL-SCNC: 22 MMOL/L — SIGNIFICANT CHANGE UP (ref 22–31)
COLOR SPEC: YELLOW — SIGNIFICANT CHANGE UP
CREAT SERPL-MCNC: 7.43 MG/DL — HIGH (ref 0.5–1.3)
GLUCOSE BLDC GLUCOMTR-MCNC: 108 MG/DL — HIGH (ref 70–99)
GLUCOSE BLDC GLUCOMTR-MCNC: 138 MG/DL — HIGH (ref 70–99)
GLUCOSE BLDC GLUCOMTR-MCNC: 145 MG/DL — HIGH (ref 70–99)
GLUCOSE BLDC GLUCOMTR-MCNC: 163 MG/DL — HIGH (ref 70–99)
GLUCOSE SERPL-MCNC: 157 MG/DL — HIGH (ref 70–99)
GLUCOSE UR-MCNC: NEGATIVE — SIGNIFICANT CHANGE UP
HCT VFR BLD CALC: 34.4 % — LOW (ref 34.5–45)
HGB BLD-MCNC: 10.4 G/DL — LOW (ref 11.5–15.5)
HYALINE CASTS # UR AUTO: NEGATIVE — SIGNIFICANT CHANGE UP
KETONES UR-MCNC: NEGATIVE — SIGNIFICANT CHANGE UP
LEUKOCYTE ESTERASE UR-ACNC: SIGNIFICANT CHANGE UP
MAGNESIUM SERPL-MCNC: 2.1 MG/DL — SIGNIFICANT CHANGE UP (ref 1.6–2.6)
MCHC RBC-ENTMCNC: 30.2 % — LOW (ref 32–36)
MCHC RBC-ENTMCNC: 32.1 PG — SIGNIFICANT CHANGE UP (ref 27–34)
MCV RBC AUTO: 106.2 FL — HIGH (ref 80–100)
NITRITE UR-MCNC: NEGATIVE — SIGNIFICANT CHANGE UP
NRBC # FLD: 0.12 K/UL — SIGNIFICANT CHANGE UP (ref 0–0)
NRBC FLD-RTO: 1.6 — SIGNIFICANT CHANGE UP
PH UR: 6.5 — SIGNIFICANT CHANGE UP (ref 5–8)
PHOSPHATE SERPL-MCNC: 5.4 MG/DL — HIGH (ref 2.5–4.5)
PLATELET # BLD AUTO: 125 K/UL — LOW (ref 150–400)
PMV BLD: 10.4 FL — SIGNIFICANT CHANGE UP (ref 7–13)
POTASSIUM SERPL-MCNC: 4.1 MMOL/L — SIGNIFICANT CHANGE UP (ref 3.5–5.3)
POTASSIUM SERPL-SCNC: 4.1 MMOL/L — SIGNIFICANT CHANGE UP (ref 3.5–5.3)
PROT UR-MCNC: 300 — HIGH
RBC # BLD: 3.24 M/UL — LOW (ref 3.8–5.2)
RBC # FLD: 18.4 % — HIGH (ref 10.3–14.5)
RBC CASTS # UR COMP ASSIST: SIGNIFICANT CHANGE UP (ref 0–?)
SODIUM SERPL-SCNC: 138 MMOL/L — SIGNIFICANT CHANGE UP (ref 135–145)
SP GR SPEC: 1.02 — SIGNIFICANT CHANGE UP (ref 1–1.04)
SQUAMOUS # UR AUTO: SIGNIFICANT CHANGE UP
UROBILINOGEN FLD QL: NORMAL — SIGNIFICANT CHANGE UP
WBC # BLD: 7.62 K/UL — SIGNIFICANT CHANGE UP (ref 3.8–10.5)
WBC # FLD AUTO: 7.62 K/UL — SIGNIFICANT CHANGE UP (ref 3.8–10.5)
WBC UR QL: >50 — HIGH (ref 0–?)

## 2020-09-20 PROCEDURE — 93306 TTE W/DOPPLER COMPLETE: CPT | Mod: 26

## 2020-09-20 PROCEDURE — 99233 SBSQ HOSP IP/OBS HIGH 50: CPT

## 2020-09-20 RX ORDER — CHLORHEXIDINE GLUCONATE 213 G/1000ML
1 SOLUTION TOPICAL
Refills: 0 | Status: DISCONTINUED | OUTPATIENT
Start: 2020-09-20 | End: 2020-09-24

## 2020-09-20 RX ORDER — ERYTHROPOIETIN 10000 [IU]/ML
6000 INJECTION, SOLUTION INTRAVENOUS; SUBCUTANEOUS
Refills: 0 | Status: DISCONTINUED | OUTPATIENT
Start: 2020-09-20 | End: 2020-09-23

## 2020-09-20 RX ORDER — SEVELAMER CARBONATE 2400 MG/1
800 POWDER, FOR SUSPENSION ORAL
Refills: 0 | Status: DISCONTINUED | OUTPATIENT
Start: 2020-09-20 | End: 2020-09-24

## 2020-09-20 RX ADMIN — SEVELAMER CARBONATE 800 MILLIGRAM(S): 2400 POWDER, FOR SUSPENSION ORAL at 12:30

## 2020-09-20 RX ADMIN — HEPARIN SODIUM 5000 UNIT(S): 5000 INJECTION INTRAVENOUS; SUBCUTANEOUS at 06:07

## 2020-09-20 RX ADMIN — HEPARIN SODIUM 5000 UNIT(S): 5000 INJECTION INTRAVENOUS; SUBCUTANEOUS at 22:54

## 2020-09-20 RX ADMIN — HEPARIN SODIUM 5000 UNIT(S): 5000 INJECTION INTRAVENOUS; SUBCUTANEOUS at 13:14

## 2020-09-20 RX ADMIN — SEVELAMER CARBONATE 800 MILLIGRAM(S): 2400 POWDER, FOR SUSPENSION ORAL at 17:40

## 2020-09-20 NOTE — CONSULT NOTE ADULT - ATTENDING COMMENTS
[facility] NEPHROLOGY  Lalo Bermudez M.D.  Gilberto Yañez D.O.  Callie Mackay M.D.  Janice Flores, MSN, ANP-C  [phone]    [address] [place] [address]
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 Possible ILR implant.

## 2020-09-20 NOTE — PHYSICAL THERAPY INITIAL EVALUATION ADULT - ADDITIONAL COMMENTS
Pt lives in apartment with elevator access with daughter. pt utilizes cane versus rollator for ambulation. Pt performed ADLs independently; however, received assistance with instrumental ADLs from daughter. Pt used to ambulate in community for exercise; however, has since stopped secondary to pandemic. Pt's daughter is available 24/7 for assistance; pt's daughter is retired.     Pt left sitting at EOB, NAD. +call bell. +tele monitor, RN aware of session.

## 2020-09-20 NOTE — CONSULT NOTE ADULT - SUBJECTIVE AND OBJECTIVE BOX
Date of Admission:  9/19/20  CHIEF COMPLAINT:  syncope and collapse in the setting of aortic stenosis  HISTORY OF PRESENT ILLNESS:    79 yo woman, Jainism, with history of ESRD on HD (MWF, via SUZE candelaria), DM2 (on insulin), HTN, moderate aortic stenosis (LEVI 1.16 cm2 on TTE in 2/2020), and gout presents following a syncopal episode on Friday evening. Pt was in her usual state of health when she went for HD on Friday, completing a full session without any issues. After her session, pt was supposed to take car service back home and waited outside the dialysis center for her ride, feeling cold and developing chills. When pt returned home, she immediately went to lie down in bed to warm herself up under the blankets, but later on, got out of bed to wash up before dinner. Pt states that the last thing she remembers before being found down on the floor of her bathroom was being in the bathroom washing her hands while sitting on the commode for support. Pt's daughter had heard a loud thud coming from the bathroom and found pt passed out on the floor. Pt denies any prodromal symptoms other than feeling chills prior to the syncopal episode. No chest pain, SOB, palpitations, lightheadedness, dizziness, headaches, vision changes, numbness/tingling, weakness, fevers, abdominal pain, nausea, vomiting, diarrhea, dark/bloody stools, or bladder/bowel dysfunction.     Of note, pt had an admission at [facility] in February this year for a similar syncopal episode and was found to have moderate aortic stenosis on TTE at the time.     VS in ED here: T 98.3, HR 69-74, -132/55-61, RR 16-22, O2 sats % RA.   Trops 74 -> 65. EKG with TWI in II, III, and aVF.   Allergies    No Known Allergies    Intolerances    	    MEDICATIONS:  heparin   Injectable 5000 Unit(s) SubCutaneous every 8 hours            dextrose 40% Gel 15 Gram(s) Oral once PRN  dextrose 50% Injectable 12.5 Gram(s) IV Push once  dextrose 50% Injectable 25 Gram(s) IV Push once  dextrose 50% Injectable 25 Gram(s) IV Push once  glucagon  Injectable 1 milliGRAM(s) IntraMuscular once PRN  insulin lispro (HumaLOG) corrective regimen sliding scale   SubCutaneous three times a day before meals  insulin lispro (HumaLOG) corrective regimen sliding scale   SubCutaneous at bedtime    dextrose 5%. 1000 milliLiter(s) IV Continuous <Continuous>  influenza   Vaccine 0.5 milliLiter(s) IntraMuscular once      PAST MEDICAL & SURGICAL HISTORY:  Bilateral cataracts    Diabetic retinopathy    Gout    Hypertension    Aortic stenosis    DM (diabetes mellitus)    ESRD (end stage renal disease) on dialysis    History of hysterectomy    AV fistula        FAMILY HISTORY:  Family history of diabetes mellitus    Family history of hypertension    Family history of heart disease        SOCIAL HISTORY:    [ ] Non-smoker  [ ] Smoker  [ ] Alcohol      REVIEW OF SYSTEMS:  See HPI. Otherwise, 10 point ROS done and otherwise negative.      T(C): 36.4 (09-20-20 @ 04:40), Max: 36.9 (09-19-20 @ 14:00)  HR: 72 (09-20-20 @ 04:40) (70 - 83)  BP: 139/63 (09-20-20 @ 04:40) (101/38 - 139/63)  RR: 18 (09-20-20 @ 04:40) (17 - 18)  SpO2: 100% (09-20-20 @ 04:40) (99% - 100%)  Wt(kg): --  I&O's Summary    19 Sep 2020 07:01  -  20 Sep 2020 07:00  --------------------------------------------------------  IN: 236 mL / OUT: 145 mL / NET: 91 mL        Physical Exam:  General: NAD  Cardiovascular: Normal S1 S2, No JVD, No murmurs, No edema  Respiratory: Lungs clear to auscultation	  Gastrointestinal:  Soft, Non-tender, + BS	  Skin: warm and dry, No rashes, No ecchymoses, No cyanosis	  Extremities:  No clubbing, cyanosis or edema  Vascular: Peripheral pulses palpable 2+ bilaterally    LABS:	   	    CBC Full  -  ( 20 Sep 2020 06:16 )  WBC Count : 7.62 K/uL  Hemoglobin : 10.4 g/dL  Hematocrit : 34.4 %  Platelet Count - Automated : 125 K/uL  Mean Cell Volume : 106.2 fL  Mean Cell Hemoglobin : 32.1 pg  Mean Cell Hemoglobin Concentration : 30.2 %  Auto Neutrophil # : x  Auto Lymphocyte # : x  Auto Monocyte # : x  Auto Eosinophil # : x  Auto Basophil # : x  Auto Neutrophil % : x  Auto Lymphocyte % : x  Auto Monocyte % : x  Auto Eosinophil % : x  Auto Basophil % : x    09-20    138  |  95<L>  |  48<H>  ----------------------------<  157<H>  4.1   |  22  |  7.43<H>  09-19    134<L>  |  92<L>  |  27<H>  ----------------------------<  161<H>  4.0   |  27  |  5.05<H>    Ca    9.1      20 Sep 2020 06:16  Ca    9.0      19 Sep 2020 06:04  Phos  5.4     09-20  Phos  3.9     09-19  Mg     2.1     09-20  Mg     2.1     09-19    TPro  6.7  /  Alb  4.2  /  TBili  0.4  /  DBili  x   /  AST  12  /  ALT  8   /  AlkPhos  90  09-19      proBNP:   Lipid Profile:   HgA1c:   TSH:       CARDIAC MARKERS:      CKMB: 1.46 ng/mL (09-19 @ 03:30)  CKMB: 1.63 ng/mL (09-19 @ 00:14)    CKMB Relative Index: Test not performed (09-19 @ 00:14)      TELEMETRY: 	    ECG:  	  RADIOLOGY:  OTHER: 	    PREVIOUS DIAGNOSTIC TESTING:    [ ] Echocardiogram:  [ ]  Catheterization:  [ ] Stress Test:  	  	  ASSESSMENT/PLAN: 	    Charisse Golden NP [id number] Date of Admission:  9/19/20  CHIEF COMPLAINT:  syncope and collapse in the setting of aortic stenosis  HISTORY OF PRESENT ILLNESS:  Patient is an 79 yo woman, Restorationism, with PMH of kidney disease, deemed to be ESRD on HD recently, she has a right chest shiley and has started dialysis outpatient on MWF, been going for about a month,DM2 (on insulin), HTN, moderate aortic stenosis (LEVI 1.16 cm2 on TTE in 2/2020), and gout presents to [facility] for syncope and collapse. Pt was in her usual state of health when she went for HD on Friday, completing a full session without any issues. After her session, pt was supposed to take car service back home and waited outside the dialysis center for her ride, feeling cold and developing chills. When pt returned home, she immediately went to lie down in bed to warm herself up under the blankets, but later on, got out of bed to wash up before dinner. Pt states that the last thing she remembers before being found down on the floor of her bathroom was being in the bathroom washing her hands while sitting on the commode for support. Pt's daughter had heard a loud thud coming from the bathroom and found pt passed out on the floor. Pt denies any prodromal symptoms other than feeling chills prior to the syncopal episode. No chest pain, SOB, palpitations, lightheadedness, dizziness, headaches, vision changes, numbness/tingling, weakness, fevers, abdominal pain, nausea, vomiting, diarrhea, dark/bloody stools, or bladder/bowel dysfunction.     Of note, pt had an admission at [facility] back in February this year for a similar syncopal episode and was found to have moderate aortic stenosis on TTE at the time.     Electrophysiology called to evaluate new onset of     VS in ED here: T 98.3, HR 69-74, -132/55-61, RR 16-22, O2 sats % RA.   Trops 74 -> 65. EKG with TWI in II, III, and aVF.   Allergies    No Known Allergies    Intolerances      MEDICATIONS:  heparin   Injectable 5000 Unit(s) SubCutaneous every 8 hours  dextrose 40% Gel 15 Gram(s) Oral once PRN  dextrose 50% Injectable 12.5 Gram(s) IV Push once  dextrose 50% Injectable 25 Gram(s) IV Push once  dextrose 50% Injectable 25 Gram(s) IV Push once  glucagon  Injectable 1 milliGRAM(s) IntraMuscular once PRN  insulin lispro (HumaLOG) corrective regimen sliding scale   SubCutaneous three times a day before meals  insulin lispro (HumaLOG) corrective regimen sliding scale   SubCutaneous at bedtime    dextrose 5%. 1000 milliLiter(s) IV Continuous <Continuous>  influenza   Vaccine 0.5 milliLiter(s) IntraMuscular once      PAST MEDICAL & SURGICAL HISTORY:  Bilateral cataracts    Diabetic retinopathy    Gout    Hypertension    Aortic stenosis    DM (diabetes mellitus)    ESRD (end stage renal disease) on dialysis    History of hysterectomy    AV fistula        FAMILY HISTORY:  Family history of diabetes mellitus    Family history of hypertension    Family history of heart disease        SOCIAL HISTORY:    [ ] Non-smoker  [ ] Smoker  [ ] Alcohol      REVIEW OF SYSTEMS:  See HPI. Otherwise, 10 point ROS done and otherwise negative.      T(C): 36.4 (09-20-20 @ 04:40), Max: 36.9 (09-19-20 @ 14:00)  HR: 72 (09-20-20 @ 04:40) (70 - 83)  BP: 139/63 (09-20-20 @ 04:40) (101/38 - 139/63)  RR: 18 (09-20-20 @ 04:40) (17 - 18)  SpO2: 100% (09-20-20 @ 04:40) (99% - 100%)  Wt(kg): --  I&O's Summary    19 Sep 2020 07:01  -  20 Sep 2020 07:00  --------------------------------------------------------  IN: 236 mL / OUT: 145 mL / NET: 91 mL        Physical Exam:  General: NAD  Cardiovascular: Normal S1 S2, No JVD, No murmurs, No edema  Respiratory: Lungs clear to auscultation	  Gastrointestinal:  Soft, Non-tender, + BS	  Skin: warm and dry, No rashes, No ecchymoses, No cyanosis	  Extremities:  No clubbing, cyanosis or edema  Vascular: Peripheral pulses palpable 2+ bilaterally    LABS:	   	    CBC Full  -  ( 20 Sep 2020 06:16 )  WBC Count : 7.62 K/uL  Hemoglobin : 10.4 g/dL  Hematocrit : 34.4 %  Platelet Count - Automated : 125 K/uL  Mean Cell Volume : 106.2 fL  Mean Cell Hemoglobin : 32.1 pg  Mean Cell Hemoglobin Concentration : 30.2 %  Auto Neutrophil # : x  Auto Lymphocyte # : x  Auto Monocyte # : x  Auto Eosinophil # : x  Auto Basophil # : x  Auto Neutrophil % : x  Auto Lymphocyte % : x  Auto Monocyte % : x  Auto Eosinophil % : x  Auto Basophil % : x    09-20    138  |  95<L>  |  48<H>  ----------------------------<  157<H>  4.1   |  22  |  7.43<H>  09-19    134<L>  |  92<L>  |  27<H>  ----------------------------<  161<H>  4.0   |  27  |  5.05<H>    Ca    9.1      20 Sep 2020 06:16  Ca    9.0      19 Sep 2020 06:04  Phos  5.4     09-20  Phos  3.9     09-19  Mg     2.1     09-20  Mg     2.1     09-19    TPro  6.7  /  Alb  4.2  /  TBili  0.4  /  DBili  x   /  AST  12  /  ALT  8   /  AlkPhos  90  09-19      proBNP:   Lipid Profile:   HgA1c:   TSH:       CARDIAC MARKERS:      CKMB: 1.46 ng/mL (09-19 @ 03:30)  CKMB: 1.63 ng/mL (09-19 @ 00:14)    CKMB Relative Index: Test not performed (09-19 @ 00:14)      TELEMETRY: 	    ECG:  	  RADIOLOGY:  OTHER: 	    PREVIOUS DIAGNOSTIC TESTING:    [ ] Echocardiogram:  [ ]  Catheterization:  [ ] Stress Test:  	  	  ASSESSMENT/PLAN: 	    Charisse Golden, AYALA [id number] Date of Admission:  9/19/20  CHIEF COMPLAINT:  syncope and collapse in the setting of aortic stenosis  HISTORY OF PRESENT ILLNESS:  Patient is an 81 yo woman, Zoroastrianism, with PMH of kidney disease, deemed to be ESRD on HD recently, she has a right chest shiley and has started dialysis outpatient on MWF, been going for about a month,DM2 (on insulin), HTN, moderate aortic stenosis (LEVI 1.16 cm2 on TTE in 2/2020), and gout presents to [facility] for syncope and collapse. Pt was in her usual state of health when she went for HD on Friday, completing a full session without any issues. After her session, pt was supposed to take car service back home and waited outside the dialysis center for her ride, feeling cold and developing chills. When pt returned home, she immediately went to lie down in bed to warm herself up under the blankets, but later on, got out of bed to wash up before dinner. Pt states that the last thing she remembers before being found down on the floor of her bathroom was being in the bathroom washing her hands while sitting on the commode for support. Pt's daughter had heard a loud thud coming from the bathroom and found pt passed out on the floor. Pt denies any prodromal symptoms other than feeling chills prior to the syncopal episode. No chest pain, SOB, palpitations, lightheadedness, dizziness, headaches, vision changes, numbness/tingling, weakness, fevers, abdominal pain, nausea, vomiting, diarrhea, dark/bloody stools, or bladder/bowel dysfunction.     Of note, pt had an admission at [facility] back in February this year for a similar syncopal episode and was found to have moderate aortic stenosis on TTE at the time.     Electrophysiology called to evaluate EKG and telemetry for atrial fibrillation vs. sinus arrythmia and for syncope and collapse.    VS in ED here: T 98.3, HR 69-74, -132/55-61, RR 16-22, O2 sats % RA.   Trops 74 -> 65. EKG with TWI in II, III, and aVF.   Allergies    No Known Allergies      MEDICATIONS:  heparin   Injectable 5000 Unit(s) SubCutaneous every 8 hours  dextrose 40% Gel 15 Gram(s) Oral once PRN  dextrose 50% Injectable 12.5 Gram(s) IV Push once  dextrose 50% Injectable 25 Gram(s) IV Push once  dextrose 50% Injectable 25 Gram(s) IV Push once  glucagon  Injectable 1 milliGRAM(s) IntraMuscular once PRN  insulin lispro (HumaLOG) corrective regimen sliding scale   SubCutaneous three times a day before meals  insulin lispro (HumaLOG) corrective regimen sliding scale   SubCutaneous at bedtime    dextrose 5%. 1000 milliLiter(s) IV Continuous <Continuous>  influenza   Vaccine 0.5 milliLiter(s) IntraMuscular once      PAST MEDICAL & SURGICAL HISTORY:  Bilateral cataracts  Diabetic retinopathy  Gout  Hypertension  Aortic stenosis  DM (diabetes mellitus)  ESRD (end stage renal disease) on dialysis  History of hysterectomy  AV fistula    FAMILY HISTORY:  Family history of diabetes mellitus  Family history of hypertension  Family history of heart disease    SOCIAL HISTORY:    [ ] Non-smoker  [ ] Smoker  [ ] Alcohol      REVIEW OF SYSTEMS:    CONSTITUTIONAL: endorsed having lightheadedness, endorses weakness, endorses chills  EYES/ENT: No visual changes;  No vertigo or throat pain   NECK: No pain or stiffness  RESPIRATORY: No cough, wheezing, hemoptysis; No shortness of breath  CARDIOVASCULAR: No chest pain or palpitations  GASTROINTESTINAL: No abdominal or epigastric pain. No nausea, vomiting, or hematemesis; No diarrhea or constipation. No melena or hematochezia.  GENITOURINARY: No dysuria, frequency or hematuria  NEUROLOGICAL: No numbness or weakness  SKIN: No itching, rashes    T(C): 36.4 (09-20-20 @ 04:40), Max: 36.9 (09-19-20 @ 14:00)  HR: 72 (09-20-20 @ 04:40) (70 - 83)  BP: 139/63 (09-20-20 @ 04:40) (101/38 - 139/63)  RR: 18 (09-20-20 @ 04:40) (17 - 18)  SpO2: 100% (09-20-20 @ 04:40) (99% - 100%)  Wt(kg): --  I&O's Summary    19 Sep 2020 07:01  -  20 Sep 2020 07:00  --------------------------------------------------------  IN: 236 mL / OUT: 145 mL / NET: 91 mL        Physical Exam:  General: NAD  Cardiovascular: right chest wall permacath, Normal S1 S2, No JVD, III/VI systolic murmur  Respiratory: Lungs clear to auscultation	  Gastrointestinal:  Soft, Non-tender, + BS	  Skin: warm and dry, No rashes, No ecchymoses, No cyanosis	  Extremities:  No clubbing, cyanosis or edema  Vascular: Peripheral pulses palpable 2+ bilaterally    LABS:	   	    CBC Full  -  ( 20 Sep 2020 06:16 )  WBC Count : 7.62 K/uL  Hemoglobin : 10.4 g/dL  Hematocrit : 34.4 %  Platelet Count - Automated : 125 K/uL  Mean Cell Volume : 106.2 fL  Mean Cell Hemoglobin : 32.1 pg  Mean Cell Hemoglobin Concentration : 30.2 %  Auto Neutrophil # : x  Auto Lymphocyte # : x  Auto Monocyte # : x  Auto Eosinophil # : x  Auto Basophil # : x  Auto Neutrophil % : x  Auto Lymphocyte % : x  Auto Monocyte % : x  Auto Eosinophil % : x  Auto Basophil % : x    09-20    138  |  95<L>  |  48<H>  ----------------------------<  157<H>  4.1   |  22  |  7.43<H>  09-19    134<L>  |  92<L>  |  27<H>  ----------------------------<  161<H>  4.0   |  27  |  5.05<H>    Ca    9.1      20 Sep 2020 06:16  Ca    9.0      19 Sep 2020 06:04  Phos  5.4     09-20  Phos  3.9     09-19  Mg     2.1     09-20  Mg     2.1     09-19    TPro  6.7  /  Alb  4.2  /  TBili  0.4  /  DBili  x   /  AST  12  /  ALT  8   /  AlkPhos  90  09-19      CKMB: 1.46 ng/mL (09-19 @ 03:30)  CKMB: 1.63 ng/mL (09-19 @ 00:14)    CKMB Relative Index: Test not performed (09-19 @ 00:14)    TELE: normal sinus rhythm      PREVIOUS DIAGNOSTIC TESTING:    [ ] Echocardiogram: results are pending

## 2020-09-20 NOTE — PHYSICAL THERAPY INITIAL EVALUATION ADULT - PERTINENT HX OF CURRENT PROBLEM, REHAB EVAL
80 y.o. Female with history of ESRD on HD presents s/p syncopal episode. Nephrology consulted for ESRD status.

## 2020-09-20 NOTE — CHART NOTE - NSCHARTNOTEFT_GEN_A_CORE
Echo showed aortic valve not well visualized; appears calcified with likely significant stenosis. Peak transaortic valve gradient equals 30 mm Hg, mean transaortic valve gradient equals 19 mm Hg. Moderate segmental left ventricular systolic dysfunction. Basal inferior, basal inferolateral, basal inferoseptal wall hypokinesis. Dr. Joshi made aware of echo results. Will f/u his recommendations. Echo showed aortic valve not well visualized; appears calcified with likely significant stenosis. Peak transaortic valve gradient equals 30 mm Hg, mean transaortic valve gradient equals 19 mm Hg. Moderate segmental left ventricular systolic dysfunction. Basal inferior, basal inferolateral, basal inferoseptal wall hypokinesis. Dr. Joshi made aware of echo results. Awaiting recommendations. For now will keep pt NPO p MN in case he wants pt to go for [facility] to further eval AS and LHC vs NST for abnormal WMA. Will f/u recs. Echo showed aortic valve not well visualized; appears calcified with likely significant stenosis. Peak transaortic valve gradient equals 30 mm Hg, mean transaortic valve gradient equals 19 mm Hg. Moderate segmental left ventricular systolic dysfunction. Basal inferior, basal inferolateral, basal inferoseptal wall hypokinesis. Discussed with Dr. Joshi- pt will get [facility] tomorrow 9/21. Will keep pt NPO p MN. Pt currently asx. Will continue to monitor.

## 2020-09-20 NOTE — CONSULT NOTE ADULT - ASSESSMENT
79 yo woman, Yazidi, with history of ESRD on HD (MWF, via R mckenna candelaria), DM2 (on insulin), HTN, moderate aortic stenosis (LEVI 1.16 cm2 on TTE in 2/2020), and gout presents following a syncopal episode on Friday evening, admitted for syncopal workup and to evaluate for cardiac arrythmia.    PLAN:  -Continue to monitor on tele  -EKG and tele strips reviewed, no signs of atrial fibrillation or other arrythmia  -ECHO results are pending, likely a moderate to severe AS is contributing factors to syncope and collapse  -orthostatics bps reviewed, does not appear grossly orthostatic, likely her syncope occurred after HD and her shifting volume status may have been contributing factors to syncope and collapse that day  -possible candidate for internal loop recorder on this admission  -heart rate and blood pressure are well-controlled  -would not initiate BB at this time  -plan discussed with Dr. Caldera, Thank you for the consultation, EP will continue to closely follow.  Charisse Golden, DNP  Cardiology

## 2020-09-20 NOTE — CONSULT NOTE ADULT - ASSESSMENT
81 yo Female with history of ESRD on HD presents s/p syncopal episode. Nephrology consulted for ESRD status.    1) ESRD: Last HD on 9/18/20 at outpt dialysis unit. Plan for next maintenance HD 9/21 via Rt IJ tunneled HD catheter. [facility] consult to assess left AVF ; if its ready to be used. Monitor electrolytes.  2) HTN with ESRD: BP acceptable off antihypertensive medications. Plan as per Cards. Monitor BP.  3) Anemia of renal disease: Hb acceptable. Will give Epogen 6k units IV tiw. Pt is Confucianism; no prbc transfusion. Monitor Hb.  4) Hyperphosphatemia: Phosphorus acceptable. Will give renvela 1 tab tid with meals. Monitor serum calcium and phosphorus.

## 2020-09-20 NOTE — PHYSICAL THERAPY INITIAL EVALUATION ADULT - DISCHARGE DISPOSITION, PT EVAL
Anticipate no skilled PT needs upon discharge. Pt would benefit from PT in hospital to improve safety and functional mobility prior to discharge. assist from daughter as needed./no skilled PT needs/home w/ assist

## 2020-09-20 NOTE — CONSULT NOTE ADULT - SUBJECTIVE AND OBJECTIVE BOX
[facility] NEPHROLOGY- CONSULTATION NOTE    Patient is a 79yo Female with PMH as below including ESRD on HD (MWF @ [place]), s/p left AVF a/w syncopal episode. Nephrology consulted for ESRD status.   Pt states she was sitting on the toilet washing her hands on friday and fell off the toilet and landed on the floor. Denies any LOC or head trauma. Pt denies any SOB or chest pain. As per pt, last HD 9/18 via Rt IJ tunneled HD catheter. Pt states she had a Left AVF placed and had 3 interventions since but was told that its still not ready to be used.       PAST MEDICAL & SURGICAL HISTORY:  Bilateral cataracts    Diabetic retinopathy    Gout    Hypertension    Aortic stenosis    DM (diabetes mellitus)    ESRD (end stage renal disease) on dialysis    History of hysterectomy    AV fistula      No Known Allergies    Home Medications Reviewed  Hospital Medications:   MEDICATIONS  (STANDING):  dextrose 5%. 1000 milliLiter(s) (50 mL/Hr) IV Continuous <Continuous>  dextrose 50% Injectable 12.5 Gram(s) IV Push once  dextrose 50% Injectable 25 Gram(s) IV Push once  dextrose 50% Injectable 25 Gram(s) IV Push once  heparin   Injectable 5000 Unit(s) SubCutaneous every 8 hours  influenza   Vaccine 0.5 milliLiter(s) IntraMuscular once  insulin lispro (HumaLOG) corrective regimen sliding scale   SubCutaneous three times a day before meals  insulin lispro (HumaLOG) corrective regimen sliding scale   SubCutaneous at bedtime    SOCIAL HISTORY:  No toxic habits  FAMILY HISTORY:  Family history of diabetes mellitus    Family history of hypertension    Family history of heart disease        REVIEW OF SYSTEMS:  Gen: no changes in weight  HEENT: no rhinorrhea  Neck: no sore throat  Cards: no chest pain  Resp: no dyspnea  GI: no nausea or vomiting or diarrhea  : no dysuria or hematuria  Vascular: no LE edema  Derm: no rashes  Neuro: no numbness/tingling  All other review of systems is negative unless indicated above.    VITALS:  T(F): 97.5 (09-20-20 @ 04:40), Max: 98.4 (09-19-20 @ 14:00)  HR: 72 (09-20-20 @ 04:40)  BP: 139/63 (09-20-20 @ 04:40)  RR: 18 (09-20-20 @ 04:40)  SpO2: 100% (09-20-20 @ 04:40)  Wt(kg): --    09-19 @ 07:01  -  09-20 @ 07:00  --------------------------------------------------------  IN: 236 mL / OUT: 145 mL / NET: 91 mL        PHYSICAL EXAM:  Gen: NAD, calm  HEENT: MMM  Neck: no JVD  Cards: RRR, +S1/S2, +CANDY  Resp: CTA B/L  GI: soft, NT/ND, NABS  : no CVA tenderness  Extremities: no LE edema B/L  Derm: no rashes  Neuro: non-focal  Access: Rt IJ tunneled HD catheter  Left AVF +thrill +bruit (as per pt not using yet)    LABS:  09-20    138  |  95<L>  |  48<H>  ----------------------------<  157<H>  4.1   |  22  |  7.43<H>    Ca    9.1      20 Sep 2020 06:16  Phos  5.4     09-20  Mg     2.1     09-20    TPro  6.7  /  Alb  4.2  /  TBili  0.4  /  DBili      /  AST  12  /  ALT  8   /  AlkPhos  90  09-19    Creatinine Trend: 7.43 <--, 5.05 <--, 4.45 <--                        10.4   7.62  )-----------( 125      ( 20 Sep 2020 06:16 )             34.4     Urine Studies:      RADIOLOGY & ADDITIONAL STUDIES:      < from: Xray Chest 1 View- PORTABLE-Urgent (Xray Chest 1 View- PORTABLE-Urgent .) (09.19.20 @ 00:03) >    EXAM:  XR CHEST PORTABLE URGENT 1V        PROCEDURE DATE:  Sep 19 2020         INTERPRETATION:  CLINICAL INFORMATION: Syncope.    TECHNIQUE: Single frontal radiograph of the chest 9/19/2020.    COMPARISON: No similar prior comparisons available.    FINDINGS:  Mildly limited examination secondary to patient rotation.  Right sided hemodialysis catheter with tip overlying the right atrium. Left axillary vascular stent.  No focal consolidation. No pleural effusion. No pneumothorax.  Cardiac size cannot accurately be assessed in this projection.    IMPRESSION: No focal consolidation.      < end of copied text >

## 2020-09-20 NOTE — CONSULT NOTE ADULT - SUBJECTIVE AND OBJECTIVE BOX
GENERAL SURGERY CONSULT NOTE    Patient is a 80y old  Female who presents with a chief complaint of Syncope (20 Sep 2020 11:31)      HPI:  79 yo woman, [place], with history of ESRD on HD (MWF, via [facility]), DM2 (on insulin), HTN, moderate aortic stenosis (LEVI 1.16 cm2 on TTE in 2020), and gout presents following a syncopal episode on Friday evening. Pt was in her usual state of health when she went for HD on Friday, completing a full session without any issues. After her session, pt was supposed to take car service back home and waited outside the dialysis center for her ride, feeling cold and developing chills. When pt returned home, she immediately went to lie down in bed to warm herself up under the blankets, but later on, got out of bed to wash up before dinner. Pt states that the last thing she remembers before being found down on the floor of her bathroom was being in the bathroom washing her hands while sitting on the commode for support. Pt's daughter had heard a loud thud coming from the bathroom and found pt passed out on the floor. Pt denies any prodromal symptoms other than feeling chills prior to the syncopal episode. No chest pain, SOB, palpitations, lightheadedness, dizziness, headaches, vision changes, numbness/tingling, weakness, fevers, abdominal pain, nausea, vomiting, diarrhea, dark/bloody stools, or bladder/bowel dysfunction.     Of note, pt had an admission at [facility] in February this year for a similar syncopal episode and was found to have moderate aortic stenosis on TTE at the time.     VS in ED here: T 98.3, HR 69-74, -132/55-61, RR 16-22, O2 sats % RA.   Trops 74 -> 65. EKG with TWI in II, III, and aVF.  (19 Sep 2020 04:58)      10-points review of system performed with pertinent negative and postive findings documented in the HPI     PAST MEDICAL & SURGICAL HISTORY:  Bilateral cataracts    Diabetic retinopathy    Gout    Hypertension    Aortic stenosis    DM (diabetes mellitus)    ESRD (end stage renal disease) on dialysis    History of hysterectomy    AV fistula      [  ] No significant past history as reviewed with the patient and family    FAMILY HISTORY:  Family history of diabetes mellitus    Family history of hypertension    Family history of heart disease    : Family history not pertinent as reviewed with the patient and family    SOCIAL HISTORY: No pertinent social history    MEDICATIONS  (STANDING):  dextrose 5%. 1000 milliLiter(s) (50 mL/Hr) IV Continuous <Continuous>  dextrose 50% Injectable 12.5 Gram(s) IV Push once  dextrose 50% Injectable 25 Gram(s) IV Push once  dextrose 50% Injectable 25 Gram(s) IV Push once  epoetin shay-epbx (RETACRIT) Injectable 6000 Unit(s) IV Push <User Schedule>  heparin   Injectable 5000 Unit(s) SubCutaneous every 8 hours  influenza   Vaccine 0.5 milliLiter(s) IntraMuscular once  insulin lispro (HumaLOG) corrective regimen sliding scale   SubCutaneous three times a day before meals  insulin lispro (HumaLOG) corrective regimen sliding scale   SubCutaneous at bedtime  sevelamer carbonate 800 milliGRAM(s) Oral three times a day with meals    MEDICATIONS  (PRN):  dextrose 40% Gel 15 Gram(s) Oral once PRN Blood Glucose LESS THAN 70 milliGRAM(s)/deciliter  glucagon  Injectable 1 milliGRAM(s) IntraMuscular once PRN Glucose LESS THAN 70 milligrams/deciliter    Allergies    No Known Allergies    Intolerances        Vital Signs Last 24 Hrs  T(C): 36.5 (20 Sep 2020 16:50), Max: 36.7 (19 Sep 2020 20:43)  T(F): 97.7 (20 Sep 2020 16:50), Max: 98 (19 Sep 2020 20:43)  HR: 84 (20 Sep 2020 16:50) (70 - 84)  BP: 121/43 (20 Sep 2020 16:50) (107/54 - 139/63)  BP(mean): --  RR: 18 (20 Sep 2020 16:50) (17 - 18)  SpO2: 100% (20 Sep 2020 16:50) (99% - 100%)  Daily     Daily     Exam:  General: Sitting in bed, eating dinner, NAD  Resp: non-labored breathing  Ext:  LUE: +thrill over AVF (brachiocephalic?), palpable radial and ulnar pulses, no numbness or pain  RUE: palpable radial pulse                        10.4   7.62  )-----------( 125      ( 20 Sep 2020 06:16 )             34.4     09-20    138  |  95<L>  |  48<H>  ----------------------------<  157<H>  4.1   |  22  |  7.43<H>    Ca    9.1      20 Sep 2020 06:16  Phos  5.4       Mg     2.1         TPro  6.7  /  Alb  4.2  /  TBili  0.4  /  DBili  x   /  AST  12  /  ALT  8   /  AlkPhos  90      PT/INR - ( 19 Sep 2020 00:14 )   PT: 12.3 SEC;   INR: 1.07          PTT - ( 19 Sep 2020 00:14 )  PTT:35.0 SEC  Urinalysis Basic - ( 20 Sep 2020 14:00 )    Color: YELLOW / Appearance: TURBID / S.017 / pH: 6.5  Gluc: NEGATIVE / Ketone: NEGATIVE  / Bili: NEGATIVE / Urobili: NORMAL   Blood: SMALL / Protein: 300 / Nitrite: NEGATIVE   Leuk Esterase: LARGE / RBC: 0-2 / WBC >50   Sq Epi: MODERATE / Non Sq Epi: x / Bacteria: MANY        IMAGING STUDIES:             GENERAL SURGERY CONSULT NOTE    Patient is a 80y old  Female who presents with a chief complaint of Syncope (20 Sep 2020 11:31)      HPI:  79 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 Pt was in her usual state of health when she went for HD on Friday, completing a full session without any issues. After her session, pt was supposed to take car service back home and waited outside the dialysis center for her ride, feeling cold and developing chills. When pt returned home, she immediately went to lie down in bed to warm herself up under the blankets, but later on, got out of bed to wash up before dinner. Pt states that the last thing she remembers before being found down on the floor of her bathroom was being in the bathroom washing her hands while sitting on the commode for support. Pt's daughter had heard a loud thud coming from the bathroom and found pt passed out on the floor. Pt denies any prodromal symptoms other than feeling chills prior to the syncopal episode. No chest pain, SOB, palpitations, lightheadedness, dizziness, headaches, vision changes, numbness/tingling, weakness, fevers, abdominal pain, nausea, vomiting, diarrhea, dark/bloody stools, or bladder/bowel dysfunction.     Of note, pt had an admission at [facility] in February this year for a similar syncopal episode and was found to have moderate aortic stenosis on TTE at the time.     Patient had tunnel HD catheter and L AV fistula creation at [facility] in 2020, 2 AVF intervention at [facility] 3 weeks later for unknown etiology. Last HD via R chest tunneled catheter on . Denies fever, chest pain, SOB, N/V, L hand pain or numbess/tingling    VS in ED here: T 98.3, HR 69-74, -132/55-61, RR 16-22, O2 sats % RA.   Trops 74 -> 65. EKG with TWI in II, III, and aVF.  (19 Sep 2020 04:58)      10-points review of system performed with pertinent negative and postive findings documented in the HPI     PAST MEDICAL & SURGICAL HISTORY:  Bilateral cataracts    Diabetic retinopathy    Gout    Hypertension    Aortic stenosis    DM (diabetes mellitus)    ESRD (end stage renal disease) on dialysis    History of hysterectomy    AV fistula      [  ] No significant past history as reviewed with the patient and family    FAMILY HISTORY:  Family history of diabetes mellitus    Family history of hypertension    Family history of heart disease    : Family history not pertinent as reviewed with the patient and family    SOCIAL HISTORY: No pertinent social history    MEDICATIONS  (STANDING):  dextrose 5%. 1000 milliLiter(s) (50 mL/Hr) IV Continuous <Continuous>  dextrose 50% Injectable 12.5 Gram(s) IV Push once  dextrose 50% Injectable 25 Gram(s) IV Push once  dextrose 50% Injectable 25 Gram(s) IV Push once  epoetin shay-epbx (RETACRIT) Injectable 6000 Unit(s) IV Push <User Schedule>  heparin   Injectable 5000 Unit(s) SubCutaneous every 8 hours  influenza   Vaccine 0.5 milliLiter(s) IntraMuscular once  insulin lispro (HumaLOG) corrective regimen sliding scale   SubCutaneous three times a day before meals  insulin lispro (HumaLOG) corrective regimen sliding scale   SubCutaneous at bedtime  sevelamer carbonate 800 milliGRAM(s) Oral three times a day with meals    MEDICATIONS  (PRN):  dextrose 40% Gel 15 Gram(s) Oral once PRN Blood Glucose LESS THAN 70 milliGRAM(s)/deciliter  glucagon  Injectable 1 milliGRAM(s) IntraMuscular once PRN Glucose LESS THAN 70 milligrams/deciliter    Allergies    No Known Allergies    Intolerances        Vital Signs Last 24 Hrs  T(C): 36.5 (20 Sep 2020 16:50), Max: 36.7 (19 Sep 2020 20:43)  T(F): 97.7 (20 Sep 2020 16:50), Max: 98 (19 Sep 2020 20:43)  HR: 84 (20 Sep 2020 16:50) (70 - 84)  BP: 121/43 (20 Sep 2020 16:50) (107/54 - 139/63)  BP(mean): --  RR: 18 (20 Sep 2020 16:50) (17 - 18)  SpO2: 100% (20 Sep 2020 16:50) (99% - 100%)  Daily     Daily     Exam:  General: Sitting in bed, eating dinner, NAD  Resp: non-labored breathing  Ext:  LUE: +thrill over AVF (brachiocephalic?), palpable radial and ulnar pulses, no numbness or pain  RUE: palpable radial pulse                        10.4   7.62  )-----------( 125      ( 20 Sep 2020 06:16 )             34.4     09-20    138  |  95<L>  |  48<H>  ----------------------------<  157<H>  4.1   |  22  |  7.43<H>    Ca    9.1      20 Sep 2020 06:16  Phos  5.4     20  Mg     2.1         TPro  6.7  /  Alb  4.2  /  TBili  0.4  /  DBili  x   /  AST  12  /  ALT  8   /  AlkPhos  90  19    PT/INR - ( 19 Sep 2020 00:14 )   PT: 12.3 SEC;   INR: 1.07          PTT - ( 19 Sep 2020 00:14 )  PTT:35.0 SEC  Urinalysis Basic - ( 20 Sep 2020 14:00 )    Color: YELLOW / Appearance: TURBID / S.017 / pH: 6.5  Gluc: NEGATIVE / Ketone: NEGATIVE  / Bili: NEGATIVE / Urobili: NORMAL   Blood: SMALL / Protein: 300 / Nitrite: NEGATIVE   Leuk Esterase: LARGE / RBC: 0-2 / WBC >50   Sq Epi: MODERATE / Non Sq Epi: x / Bacteria: MANY        IMAGING STUDIES:

## 2020-09-20 NOTE — PHYSICAL THERAPY INITIAL EVALUATION ADULT - GENERAL OBSERVATIONS, REHAB EVAL
Pt received sitting at EOB, +tele monitor, NAD. Pt agreeable to PT consultation. Cleared for PT as per APURVA Luque

## 2020-09-20 NOTE — CONSULT NOTE ADULT - ASSESSMENT
80F, [place] w/ hx of ESRD (via R chest tunneled catheter), DM2, HTN, moderate AS, presents after syncope at home. Vascular surgery consulted to assess AVF readiness to be used for HD    Plan:  - duplex US of LUE for hemodialysis access  - please obtain record from [place]/ [facility] and [facility] regarding AVF  - Plan discussed with vascular surgery fellow, Dr. Foster, on behalf of Dr. Lassiter    [id number] 80F, [employer] w/ hx of ESRD (via R chest tunneled catheter), DM2, HTN, moderate AS, s/p L AVF creation at OSH, presents after syncope at home. Vascular surgery consulted to assess AVF readiness to be used for HD    Plan:  - duplex US of LUE for hemodialysis access  - please obtain record from [place]/ [facility] regarding AVF  - Plan discussed with vascular surgery fellow, Dr. Foster, on behalf of Dr. Lassiter    [id number]

## 2020-09-21 PROBLEM — N18.6 END STAGE RENAL DISEASE: Chronic | Status: ACTIVE | Noted: 2020-09-18

## 2020-09-21 PROBLEM — M10.9 GOUT, UNSPECIFIED: Chronic | Status: ACTIVE | Noted: 2020-09-19

## 2020-09-21 PROBLEM — H26.9 UNSPECIFIED CATARACT: Chronic | Status: ACTIVE | Noted: 2020-09-19

## 2020-09-21 PROBLEM — I10 ESSENTIAL (PRIMARY) HYPERTENSION: Chronic | Status: ACTIVE | Noted: 2020-09-19

## 2020-09-21 PROBLEM — E11.319 TYPE 2 DIABETES MELLITUS WITH UNSPECIFIED DIABETIC RETINOPATHY WITHOUT MACULAR EDEMA: Chronic | Status: ACTIVE | Noted: 2020-09-19

## 2020-09-21 LAB
GLUCOSE BLDC GLUCOMTR-MCNC: 110 MG/DL — HIGH (ref 70–99)
GLUCOSE BLDC GLUCOMTR-MCNC: 125 MG/DL — HIGH (ref 70–99)
GLUCOSE BLDC GLUCOMTR-MCNC: 89 MG/DL — SIGNIFICANT CHANGE UP (ref 70–99)
HBV SURFACE AG SER-ACNC: NEGATIVE — SIGNIFICANT CHANGE UP

## 2020-09-21 PROCEDURE — 93460 R&L HRT ART/VENTRICLE ANGIO: CPT | Mod: 26,59

## 2020-09-21 PROCEDURE — 99152 MOD SED SAME PHYS/QHP 5/>YRS: CPT

## 2020-09-21 PROCEDURE — 93010 ELECTROCARDIOGRAM REPORT: CPT

## 2020-09-21 PROCEDURE — 99232 SBSQ HOSP IP/OBS MODERATE 35: CPT

## 2020-09-21 PROCEDURE — 93571 IV DOP VEL&/PRESS C FLO 1ST: CPT | Mod: 26,RC

## 2020-09-21 RX ADMIN — HEPARIN SODIUM 5000 UNIT(S): 5000 INJECTION INTRAVENOUS; SUBCUTANEOUS at 17:23

## 2020-09-21 RX ADMIN — CHLORHEXIDINE GLUCONATE 1 APPLICATION(S): 213 SOLUTION TOPICAL at 17:22

## 2020-09-21 RX ADMIN — SEVELAMER CARBONATE 800 MILLIGRAM(S): 2400 POWDER, FOR SUSPENSION ORAL at 17:23

## 2020-09-21 NOTE — CHART NOTE - NSCHARTNOTEFT_GEN_A_CORE
Patient seen in HD suit, appears comfortable NAD, without complaints, pt s/p cardiac cath, Rt groin appears clean, dry, intact, no evidence of active bleeding, minimal amount of dried blood noted on the gauze previously marked- stable,  no hematoma, + pulses. Continue to monitor.

## 2020-09-22 LAB
ANION GAP SERPL CALC-SCNC: 20 MMO/L — HIGH (ref 7–14)
BUN SERPL-MCNC: 40 MG/DL — HIGH (ref 7–23)
CALCIUM SERPL-MCNC: 9.2 MG/DL — SIGNIFICANT CHANGE UP (ref 8.4–10.5)
CHLORIDE SERPL-SCNC: 93 MMOL/L — LOW (ref 98–107)
CO2 SERPL-SCNC: 23 MMOL/L — SIGNIFICANT CHANGE UP (ref 22–31)
CREAT SERPL-MCNC: 6.69 MG/DL — HIGH (ref 0.5–1.3)
GLUCOSE BLDC GLUCOMTR-MCNC: 102 MG/DL — HIGH (ref 70–99)
GLUCOSE BLDC GLUCOMTR-MCNC: 115 MG/DL — HIGH (ref 70–99)
GLUCOSE BLDC GLUCOMTR-MCNC: 115 MG/DL — HIGH (ref 70–99)
GLUCOSE BLDC GLUCOMTR-MCNC: 144 MG/DL — HIGH (ref 70–99)
GLUCOSE BLDC GLUCOMTR-MCNC: 181 MG/DL — HIGH (ref 70–99)
GLUCOSE BLDC GLUCOMTR-MCNC: 200 MG/DL — HIGH (ref 70–99)
GLUCOSE SERPL-MCNC: 218 MG/DL — HIGH (ref 70–99)
HCT VFR BLD CALC: 36.8 % — SIGNIFICANT CHANGE UP (ref 34.5–45)
HGB BLD-MCNC: 11.3 G/DL — LOW (ref 11.5–15.5)
MAGNESIUM SERPL-MCNC: 2 MG/DL — SIGNIFICANT CHANGE UP (ref 1.6–2.6)
MCHC RBC-ENTMCNC: 30.7 % — LOW (ref 32–36)
MCHC RBC-ENTMCNC: 32.2 PG — SIGNIFICANT CHANGE UP (ref 27–34)
MCV RBC AUTO: 104.8 FL — HIGH (ref 80–100)
NRBC # FLD: 0.14 K/UL — SIGNIFICANT CHANGE UP (ref 0–0)
NRBC FLD-RTO: 1.7 — SIGNIFICANT CHANGE UP
PHOSPHATE SERPL-MCNC: 4.5 MG/DL — SIGNIFICANT CHANGE UP (ref 2.5–4.5)
PLATELET # BLD AUTO: 124 K/UL — LOW (ref 150–400)
PMV BLD: 10.6 FL — SIGNIFICANT CHANGE UP (ref 7–13)
POTASSIUM SERPL-MCNC: 4 MMOL/L — SIGNIFICANT CHANGE UP (ref 3.5–5.3)
POTASSIUM SERPL-SCNC: 4 MMOL/L — SIGNIFICANT CHANGE UP (ref 3.5–5.3)
RBC # BLD: 3.51 M/UL — LOW (ref 3.8–5.2)
RBC # FLD: 19.1 % — HIGH (ref 10.3–14.5)
SODIUM SERPL-SCNC: 136 MMOL/L — SIGNIFICANT CHANGE UP (ref 135–145)
WBC # BLD: 8.1 K/UL — SIGNIFICANT CHANGE UP (ref 3.8–10.5)
WBC # FLD AUTO: 8.1 K/UL — SIGNIFICANT CHANGE UP (ref 3.8–10.5)

## 2020-09-22 PROCEDURE — 93990 DOPPLER FLOW TESTING: CPT | Mod: 26

## 2020-09-22 PROCEDURE — 33285 INSJ SUBQ CAR RHYTHM MNTR: CPT

## 2020-09-22 RX ORDER — APIXABAN 2.5 MG/1
1 TABLET, FILM COATED ORAL
Qty: 0 | Refills: 0 | DISCHARGE

## 2020-09-22 RX ORDER — CYPROHEPTADINE HYDROCHLORIDE 4 MG/1
4 TABLET ORAL
Refills: 0 | Status: DISCONTINUED | OUTPATIENT
Start: 2020-09-22 | End: 2020-09-24

## 2020-09-22 RX ORDER — ATORVASTATIN CALCIUM 80 MG/1
20 TABLET, FILM COATED ORAL AT BEDTIME
Refills: 0 | Status: DISCONTINUED | OUTPATIENT
Start: 2020-09-22 | End: 2020-09-24

## 2020-09-22 RX ORDER — METOPROLOL TARTRATE 50 MG
25 TABLET ORAL DAILY
Refills: 0 | Status: DISCONTINUED | OUTPATIENT
Start: 2020-09-22 | End: 2020-09-24

## 2020-09-22 RX ORDER — FERROUS SULFATE 325(65) MG
325 TABLET ORAL DAILY
Refills: 0 | Status: DISCONTINUED | OUTPATIENT
Start: 2020-09-22 | End: 2020-09-24

## 2020-09-22 RX ADMIN — Medication 1: at 12:37

## 2020-09-22 RX ADMIN — SEVELAMER CARBONATE 800 MILLIGRAM(S): 2400 POWDER, FOR SUSPENSION ORAL at 10:48

## 2020-09-22 RX ADMIN — HEPARIN SODIUM 5000 UNIT(S): 5000 INJECTION INTRAVENOUS; SUBCUTANEOUS at 12:26

## 2020-09-22 RX ADMIN — Medication 1: at 17:35

## 2020-09-22 RX ADMIN — ATORVASTATIN CALCIUM 20 MILLIGRAM(S): 80 TABLET, FILM COATED ORAL at 21:15

## 2020-09-22 RX ADMIN — HEPARIN SODIUM 5000 UNIT(S): 5000 INJECTION INTRAVENOUS; SUBCUTANEOUS at 01:11

## 2020-09-22 RX ADMIN — CHLORHEXIDINE GLUCONATE 1 APPLICATION(S): 213 SOLUTION TOPICAL at 10:50

## 2020-09-22 RX ADMIN — SEVELAMER CARBONATE 800 MILLIGRAM(S): 2400 POWDER, FOR SUSPENSION ORAL at 12:26

## 2020-09-22 RX ADMIN — SEVELAMER CARBONATE 800 MILLIGRAM(S): 2400 POWDER, FOR SUSPENSION ORAL at 17:36

## 2020-09-22 RX ADMIN — ERYTHROPOIETIN 6000 UNIT(S): 10000 INJECTION, SOLUTION INTRAVENOUS; SUBCUTANEOUS at 00:13

## 2020-09-22 NOTE — PROVIDER CONTACT NOTE (OTHER) - ASSESSMENT
Patient was walking to the bathroom. Patient asymptomatic. denies any pain or distress noted. no shortness of breath or chest pain noted. Patient still alert and oriented x 4 . vital signs stable

## 2020-09-22 NOTE — CHART NOTE - NSCHARTNOTEFT_GEN_A_CORE
ELECTROPHYSIOLOGY      Patient seen in IRS.  She is s/p ILR implantation.  Tolerated the procedure well. No complications.   Vital signs stable. Telemetry: Normal sinus rhythm   Dressing dry and intact. No evidence of bleeding, hematoma or ecchymosis.   Post procedure ILR teaching done. Written instructions and contact information provided.   Patient given home monitor with verbal and written instructions.   She has a follow-up appointment in the device clinic on Monday 10/5/2020 at 11:15am  [address] [place] [address] Oncology [facility] [phone]. ELECTROPHYSIOLOGY      Patient seen in IRS.  She is s/p ILR implantation.  Tolerated the procedure well. No complications.   Vital signs stable. Telemetry: Normal sinus rhythm   Dressing dry and intact. No evidence of bleeding, hematoma or ecchymosis.   Post procedure ILR teaching done. Written instructions and contact information provided.   Patient given home monitor with verbal and written instructions.   She has a follow-up appointment in the device clinic on Thursday 10/15/2020 at 10:00am  [address] [place] [address] Oncology [facility] [phone].

## 2020-09-22 NOTE — CHART NOTE - NSCHARTNOTEFT_GEN_A_CORE
Patient is s/p ILR this morning. Site checked. Dressing clean, dry, and intact. No pain around the site. Will continue to monitor. Patient is s/p ILR this morning. Site checked. Dressing clean, dry, and intact. No evidence of bleeding or hematoma. No pain around the site. Will continue to monitor.

## 2020-09-22 NOTE — PROGRESS NOTE ADULT - PROBLEM SELECTOR PLAN 2
Moderate aortic stenosis (LEVI 1.16 cm2 on TTE in 2/2020).   - F/u TTE to evaluate degree of aortic stenosis and for any other structural/valvular abnormalities  -

## 2020-09-22 NOTE — PROGRESS NOTE ADULT - PROBLEM SELECTOR PLAN 4
no signs of active bleeding at present , monitor closely

## 2020-09-22 NOTE — PROGRESS NOTE ADULT - PROBLEM SELECTOR PLAN 6
On insulin at home. HgbA1c 6.4% in 2/2020.  - Start low-dose ISS and FS checks qAC tid and qhs  - F/u A1c  - Pt does not know her home insulin regimen. Will need to call pt's daughter (Azra: [phone] (C), [phone] (H)) during the day, as she has a list of pt's home meds.
On insulin at home. HgbA1c 6.4% in 2/2020.  - Start low-dose ISS and FS checks qAC tid and qhs  - F/u A1c  - Pt does not know her home insulin regimen. Will need to call pt's daughter (Azra: [phone] (C), [phone] (H)) during the day, as she has a list of pt's home meds.
On insulin at home. HgbA1c 6.4% in 2/2020.  - Start low-dose ISS and FS checks qAC tid and qhs  - F/u A1c  - Pt does not know her home insulin regimen. Will need to call pt's daughter (Azra: [phone] (C), [phone] (H)) during the day, as she has a list of pt's home meds.

## 2020-09-22 NOTE — PROGRESS NOTE ADULT - PROBLEM SELECTOR PLAN 7
No S/S of flare. Stable off of any meds.  - Monitor for S/S of flare

## 2020-09-22 NOTE — PROGRESS NOTE ADULT - PROBLEM SELECTOR PLAN 5
titrate htn meds for optimal bp control

## 2020-09-22 NOTE — CHART NOTE - NSCHARTNOTEFT_GEN_A_CORE
medrec complete. Daughter brought in list of medications. Will hold off BP meds since BP has been acceptable here. If BP elevates, will add back on home meds. Of note, pt had L IJ thrombus at [facility]. Pt finished 1 month course of Eliquis. [facility] records in back of chart. medrec complete. Daughter brought in list of medications. Will hold off BP meds since BP has been acceptable here. Metoprolol added at lower dose since she has CAD. Of note, pt had L IJ thrombus at [facility]. Pt finished 1 month course of Eliquis. [facility] records in back of chart. Discussed with Dr. Joshi.

## 2020-09-22 NOTE — PROVIDER CONTACT NOTE (OTHER) - ACTION/TREATMENT ORDERED:
tele Isabella Agee notified and made aware. No further recommendation at this time . continue to monitor.

## 2020-09-22 NOTE — CHART NOTE - NSCHARTNOTEFT_GEN_A_CORE
Type of Procedure: ILR implant  Licensed independent practitioner: Juan Caldera MD  Assistant: None  Description of procedure:   Written informed consent was obtained from the patient after a full explanation of the risks and benefits of  the procedure. The patient was brought to the lab in the fasting state. Continuous electrocardiographic and  hemodynamic monitoring was initiated. The patient was prepped and draped in the usual sterile fashion.   Local anesthetic was delivered and a 1 cm diagonal incision was made just lateral to the sternum using the  incision tool supplied by the . Using the insertion tool, a subcutaneous tunnel was created  approximately 8 mm under the skin. The insertion tool was then rotated 180 degrees to create a pocket for the  device, and the preloaded device was then inserted into the pocket. The device was held in place while the  insertion tool was removed. Hemostasis was achieved with manual pressure. Dermabond was then placed  over the incision. The wound was covered with a dry sterile dressing. The procedure was well  tolerated and the patient left the laboratory alert and in good condition. Patient education regarding device  triggering was performed prior to discharge.  During the procedure, a MDT rep was present to manage a complex .  Programming was as follows:  - Tachycardia: 171 bpm), 16 beats duration  - Bradycardia: >2,000ms   - Pause: >3 seconds  - AF detection: ON    Estimated blood loss: 0cc  Specimen removed: N/A  Preoperative Dx: CVA  Postoperative Dx: CVA  Complications: None  Anesthesia type: Local.

## 2020-09-23 ENCOUNTER — TRANSCRIPTION ENCOUNTER (OUTPATIENT)
Age: 80
End: 2020-09-23

## 2020-09-23 LAB
-  AMPICILLIN: SIGNIFICANT CHANGE UP
-  CIPROFLOXACIN: SIGNIFICANT CHANGE UP
-  LEVOFLOXACIN: SIGNIFICANT CHANGE UP
-  NITROFURANTOIN: SIGNIFICANT CHANGE UP
-  TETRACYCLINE: SIGNIFICANT CHANGE UP
-  VANCOMYCIN: SIGNIFICANT CHANGE UP
ANION GAP SERPL CALC-SCNC: 19 MMO/L — HIGH (ref 7–14)
BUN SERPL-MCNC: 61 MG/DL — HIGH (ref 7–23)
CALCIUM SERPL-MCNC: 8.5 MG/DL — SIGNIFICANT CHANGE UP (ref 8.4–10.5)
CHLORIDE SERPL-SCNC: 96 MMOL/L — LOW (ref 98–107)
CO2 SERPL-SCNC: 22 MMOL/L — SIGNIFICANT CHANGE UP (ref 22–31)
CREAT SERPL-MCNC: 8.24 MG/DL — HIGH (ref 0.5–1.3)
CULTURE RESULTS: SIGNIFICANT CHANGE UP
GLUCOSE BLDC GLUCOMTR-MCNC: 139 MG/DL — HIGH (ref 70–99)
GLUCOSE BLDC GLUCOMTR-MCNC: 157 MG/DL — HIGH (ref 70–99)
GLUCOSE BLDC GLUCOMTR-MCNC: 160 MG/DL — HIGH (ref 70–99)
GLUCOSE BLDC GLUCOMTR-MCNC: 242 MG/DL — HIGH (ref 70–99)
GLUCOSE SERPL-MCNC: 146 MG/DL — HIGH (ref 70–99)
HCT VFR BLD CALC: 30.8 % — LOW (ref 34.5–45)
HGB BLD-MCNC: 9.5 G/DL — LOW (ref 11.5–15.5)
MAGNESIUM SERPL-MCNC: 2.2 MG/DL — SIGNIFICANT CHANGE UP (ref 1.6–2.6)
MCHC RBC-ENTMCNC: 30.8 % — LOW (ref 32–36)
MCHC RBC-ENTMCNC: 33 PG — SIGNIFICANT CHANGE UP (ref 27–34)
MCV RBC AUTO: 106.9 FL — HIGH (ref 80–100)
METHOD TYPE: SIGNIFICANT CHANGE UP
NRBC # FLD: 0.04 K/UL — SIGNIFICANT CHANGE UP (ref 0–0)
ORGANISM # SPEC MICROSCOPIC CNT: SIGNIFICANT CHANGE UP
ORGANISM # SPEC MICROSCOPIC CNT: SIGNIFICANT CHANGE UP
PHOSPHATE SERPL-MCNC: 4.9 MG/DL — HIGH (ref 2.5–4.5)
PLATELET # BLD AUTO: 127 K/UL — LOW (ref 150–400)
PMV BLD: 10 FL — SIGNIFICANT CHANGE UP (ref 7–13)
POTASSIUM SERPL-MCNC: 4.4 MMOL/L — SIGNIFICANT CHANGE UP (ref 3.5–5.3)
POTASSIUM SERPL-SCNC: 4.4 MMOL/L — SIGNIFICANT CHANGE UP (ref 3.5–5.3)
RBC # BLD: 2.88 M/UL — LOW (ref 3.8–5.2)
RBC # FLD: 19.1 % — HIGH (ref 10.3–14.5)
SODIUM SERPL-SCNC: 137 MMOL/L — SIGNIFICANT CHANGE UP (ref 135–145)
SPECIMEN SOURCE: SIGNIFICANT CHANGE UP
WBC # BLD: 6.31 K/UL — SIGNIFICANT CHANGE UP (ref 3.8–10.5)
WBC # FLD AUTO: 6.31 K/UL — SIGNIFICANT CHANGE UP (ref 3.8–10.5)

## 2020-09-23 RX ADMIN — Medication 1: at 08:40

## 2020-09-23 RX ADMIN — Medication 325 MILLIGRAM(S): at 13:01

## 2020-09-23 RX ADMIN — CYPROHEPTADINE HYDROCHLORIDE 4 MILLIGRAM(S): 4 TABLET ORAL at 05:47

## 2020-09-23 RX ADMIN — Medication 1: at 13:01

## 2020-09-23 RX ADMIN — ATORVASTATIN CALCIUM 20 MILLIGRAM(S): 80 TABLET, FILM COATED ORAL at 21:19

## 2020-09-23 RX ADMIN — CHLORHEXIDINE GLUCONATE 1 APPLICATION(S): 213 SOLUTION TOPICAL at 11:24

## 2020-09-23 RX ADMIN — SEVELAMER CARBONATE 800 MILLIGRAM(S): 2400 POWDER, FOR SUSPENSION ORAL at 13:01

## 2020-09-23 RX ADMIN — Medication 25 MILLIGRAM(S): at 05:47

## 2020-09-23 RX ADMIN — HEPARIN SODIUM 5000 UNIT(S): 5000 INJECTION INTRAVENOUS; SUBCUTANEOUS at 00:25

## 2020-09-23 RX ADMIN — SEVELAMER CARBONATE 800 MILLIGRAM(S): 2400 POWDER, FOR SUSPENSION ORAL at 08:40

## 2020-09-23 NOTE — DISCHARGE NOTE PROVIDER - NSDCMRMEDTOKEN_GEN_ALL_CORE_FT
atorvastatin 20 mg oral tablet: 1 tab(s) orally once a day  calcitriol 0.25 mcg oral capsule: 1 cap(s) orally once a day  cyproheptadine 4 mg oral tablet: 1 tab(s) orally once a day  ergocalciferol 50,000 intl units (1.25 mg) oral capsule: 1 cap(s) orally once a week  ferrous sulfate 325 mg (65 mg elemental iron) oral tablet: 1 tab(s) orally once a day  hydrALAZINE 50 mg oral tablet: 1 tab(s) orally 3 times a day  isosorbide mononitrate 60 mg oral tablet, extended release: 1 tab(s) orally once a day (in the morning)  Metoprolol Succinate ER 50 mg oral tablet, extended release: 1 tab(s) orally once a day  NIFEdipine (Eqv-Procardia XL) 90 mg oral tablet, extended release: 1 tab(s) orally once a day  NovoLOG FlexPen 100 units/mL injectable solution: 20 unit(s) injectable 3 times a day (before meals)  ProAir HFA 90 mcg/inh inhalation aerosol: 2 puff(s) inhaled every 6 hours  sodium bicarbonate 650 mg oral tablet: 1 tab(s) orally 3 times a day   atorvastatin 20 mg oral tablet: 1 tab(s) orally once a day  cyproheptadine 4 mg oral tablet: 1 tab(s) orally once a day  epoetin shay: 4000 unit(s) intravenous 3 times a week intra dialysis   ferrous sulfate 325 mg (65 mg elemental iron) oral tablet: 1 tab(s) orally once a day  metoprolol succinate 25 mg oral tablet, extended release: 1 tab(s) orally once a day  ProAir HFA 90 mcg/inh inhalation aerosol: 2 puff(s) inhaled every 6 hours  sevelamer carbonate 800 mg oral tablet: 1 tab(s) orally 3 times a day (with meals)   aspirin 81 mg oral tablet: 1 tab(s) orally once a day  atorvastatin 20 mg oral tablet: 1 tab(s) orally once a day  cyproheptadine 4 mg oral tablet: 1 tab(s) orally once a day  epoetin shay: 4000 unit(s) intravenous 3 times a week intra dialysis   ferrous sulfate 325 mg (65 mg elemental iron) oral tablet: 1 tab(s) orally once a day  metoprolol succinate 25 mg oral tablet, extended release: 1 tab(s) orally once a day  ProAir HFA 90 mcg/inh inhalation aerosol: 2 puff(s) inhaled every 6 hours  sevelamer carbonate 800 mg oral tablet: 1 tab(s) orally 3 times a day (with meals)

## 2020-09-23 NOTE — PROVIDER CONTACT NOTE (OTHER) - ASSESSMENT
Patient oriented and alert x4. Patient is asymptomatic and not complaining of shortness of breath or chest pain. She refused AM labs and wants it to be done during dialysis

## 2020-09-23 NOTE — CHART NOTE - NSCHARTNOTEFT_GEN_A_CORE
Pt seen and examined. Pt noted with bruising from ILR. There is bruising starting on the left side of the dressing that goes down to the left nipple approximately 3 inches. Pt tender to touch. Called EP AYALA Singh who saw pt at bedside. Suspect that the bruising happened overnight from pt lying on her left side. No active bleeding noted. Pt refusing CBC right now and states she will get it at HD. Heparin SQ discontinued. Will continue to monitor. Pt seen and examined. Pt noted with bruising from ILR. There is bruising starting on the left side of the dressing that goes down to the left nipple approximately 3 inches. Pt tender to touch. Called EP AYALA Singh who saw pt at bedside. Suspect that the bruising happened overnight from pt lying on her left side. No active bleeding noted. Pt refusing CBC right now and states she will get it at HD. Heparin SQ discontinued. 500cc NS bag taped over area for pressure while pt lying down. Will continue to monitor.

## 2020-09-23 NOTE — DISCHARGE NOTE PROVIDER - NSDCFUSCHEDAPPT_GEN_ALL_CORE_FT
MAGDIEL GUERRIER ; 09/25/2020 ; [facility] [id number] ; 10/15/2020 ; [facility] [phone]

## 2020-09-23 NOTE — DISCHARGE NOTE PROVIDER - CARE PROVIDER_API CALL
Amol Joshi)  Cardiology  [address] [place] [address]  Phone: [phone]  Fax: [phone]  Follow Up Time:     Juan Caldera  CARDIAC ELECTROPHYSIOLOGY  [address] [address] [place] [address]  Phone: [phone]  Fax: [phone]  Follow Up Time:     ZO LANDAVERDE  [id number]  [address] [place] [address]  Phone: [phone]  Fax: [phone]  Follow Up Time:

## 2020-09-23 NOTE — CHART NOTE - NSCHARTNOTEFT_GEN_A_CORE
ELECTROPHYSIOLOGY      Patient s/p ILR yesterday.  Tolerated the procedure well.   Overnight she developed ecchymosis extending form the ILR site to the left breast nipple area.  The area is tender to palpation. Breast is soft without hematoma. No overt bleeding from the ILR site. Dressing dry and intact.   Currently the site appears stable. Can apply pressure dressing and remove prior to discharge tomorrow.  Telemetry:  Normal sinus rhythm with APC's.

## 2020-09-23 NOTE — DISCHARGE NOTE PROVIDER - NSDCFUADDINST_GEN_ALL_CORE_FT
No scrubbing the incision site for 2 weeks  - No lotion, ointment, powder or direct sunlight to the incision site for 2 weeks    Please call [phone] if the following occurs:      - fever with temperature > 100.6      - swelling, drainage or bleeding at the site incision    -No strenuous activity x 3 weeks.  No heavy lifting > 5-10lbs x one week.  Monitor right groin for bleeding, pain, swelling, discharge.  Notify MD if symptoms occur.  You may shower, no baths/swimming x one week.

## 2020-09-23 NOTE — DISCHARGE NOTE PROVIDER - NSDCFUADDAPPT_GEN_ALL_CORE_FT
Please follow up in the device clinic on Monday 10/5/2020 at 11:15am  4th floor Oncology Building [phone].  Please follow up with Dr. Joshi.  Please follow up with your PCP. Please follow up in the device clinic on Monday 10/15/2020 at 10m  4th floor Oncology Building [phone].  Please follow up with Dr. Joshi, please call to make an appointment.  Please follow up with your PCP.

## 2020-09-23 NOTE — DISCHARGE NOTE PROVIDER - PROVIDER TOKENS
PROVIDER:[TOKEN:[[id number]]],PROVIDER:[TOKEN:[[id number]]],PROVIDER:[TOKEN:[[id number]]]

## 2020-09-23 NOTE — DISCHARGE NOTE PROVIDER - CARE PROVIDERS DIRECT ADDRESSES
,DirectAddress_Unknown,[email or web address],DirectAddress_Unknown

## 2020-09-23 NOTE — DISCHARGE NOTE PROVIDER - NSDCCPCAREPLAN_GEN_ALL_CORE_FT
PRINCIPAL DISCHARGE DIAGNOSIS  Diagnosis: Syncope and collapse  Assessment and Plan of Treatment: You were admitted after you collapsed. You had a CT of your head that was normal. 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 Please continue to take Atorvastatin. Your echo also showed aortic stenosis (valvular disease). This may have been a contributing factor to your collapse. You got a loop recorder placed by Electrophysiology 9/22. Please follow up in the device clinic on Monday 10/5/2020 at 11:15am  4th floor Oncology Building [phone].      SECONDARY DISCHARGE DIAGNOSES  Diagnosis: Type 2 diabetes mellitus with chronic kidney disease on chronic dialysis, with long-term current use of insulin  Assessment and Plan of Treatment: Your Hemoglobin A1C is 5.4. Target goal for hemoglobin A1C is <6.5. Therefore your insulin was discontinued. Your finger sticks have been stable in the hospital. Monitor blood glucose levels throughout the day before meals and at bedtime. Record blood sugars and bring to outpatient providers appointment in order to be reviewed by your doctor for management modifications. If your sugars are more than 400 or less than 70 you should contact your PCP immediately. Monitor for signs/symptoms of low blood glucose, such as, dizziness, altered mental status, or cool/clammy skin. In addition, monitor for signs/symptoms of high blood glucose, such as, feeling hot, dry, fatigued, or with increased thirst/urination. Make regular podiatry appointments in order to have feet checked for wounds and uncontrolled toe nail growth to prevent infections, as well as, appointments with an ophthalmologist to monitor your vision.    Diagnosis: ESRD (end stage renal disease) on dialysis  Assessment and Plan of Treatment: Your fistula is mature and ready to use. Please continue your dialysis schedule.     PRINCIPAL DISCHARGE DIAGNOSIS  Diagnosis: Syncope and collapse  Assessment and Plan of Treatment: You were admitted after you collapsed. You had a CT of your head that was normal. You had an echo done that was abnormal so you had a cardiac -No strenuous activity x 3 weeks.  No heavy lifting > 5-10lbs x one week.  Monitor right groin for bleeding, pain, swelling, discharge.  Notify MD if symptoms occur.  You may shower, no baths/swimming x one week. 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 Please continue to take Atorvastatin. Your echo also showed aortic stenosis (valvular disease). This may have been a contributing factor to your collapse. You got a loop recorder placed by Electrophysiology 9/22. Please follow up in the device clinic on Monday 10/15/2020 at 10am on 4th floor Oncology Building [phone].      SECONDARY DISCHARGE DIAGNOSES  Diagnosis: ESRD (end stage renal disease) on dialysis  Assessment and Plan of Treatment: Your fistula is mature and ready to use. Please continue your dialysis schedule.    Diagnosis: Type 2 diabetes mellitus with chronic kidney disease on chronic dialysis, with long-term current use of insulin  Assessment and Plan of Treatment: Your Hemoglobin A1C is 5.4. Target goal for hemoglobin A1C is <6.5. Therefore your insulin was discontinued. Your finger sticks have been stable in the hospital. Monitor blood glucose levels throughout the day before meals and at bedtime. Record blood sugars and bring to outpatient providers appointment in order to be reviewed by your doctor for management modifications. If your sugars are more than 400 or less than 70 you should contact your PCP immediately. Monitor for signs/symptoms of low blood glucose, such as, dizziness, altered mental status, or cool/clammy skin. In addition, monitor for signs/symptoms of high blood glucose, such as, feeling hot, dry, fatigued, or with increased thirst/urination. Make regular podiatry appointments in order to have feet checked for wounds and uncontrolled toe nail growth to prevent infections, as well as, appointments with an ophthalmologist to monitor your vision.     PRINCIPAL DISCHARGE DIAGNOSIS  Diagnosis: Syncope and collapse  Assessment and Plan of Treatment: You were admitted after you collapsed. You had a CT of your head that was normal. You had an echo done that was abnormal so you had a cardiac -No strenuous activity x 3 weeks.  No heavy lifting > 5-10lbs x one week.  Monitor right groin for bleeding, pain, swelling, discharge.  Notify MD if symptoms occur.  You may shower, no baths/swimming x one week. 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 Continue Aspirin once daily and follow up with cardiology. Please continue to take Atorvastatin. Your echo also showed aortic stenosis (valvular disease). This may have been a contributing factor to your collapse. You got a loop recorder placed by Electrophysiology 9/22. Please follow up in the device clinic on Monday 10/15/2020 at 10am on 4th floor Oncology Building [phone].      SECONDARY DISCHARGE DIAGNOSES  Diagnosis: ESRD (end stage renal disease) on dialysis  Assessment and Plan of Treatment: Your fistula is mature and ready to use. Please continue your dialysis schedule.    Diagnosis: Type 2 diabetes mellitus with chronic kidney disease on chronic dialysis, with long-term current use of insulin  Assessment and Plan of Treatment: Your Hemoglobin A1C is 5.4. Target goal for hemoglobin A1C is <6.5. Therefore your insulin was discontinued. Your finger sticks have been stable in the hospital. Monitor blood glucose levels throughout the day before meals and at bedtime. Record blood sugars and bring to outpatient providers appointment in order to be reviewed by your doctor for management modifications. If your sugars are more than 400 or less than 70 you should contact your PCP immediately. Monitor for signs/symptoms of low blood glucose, such as, dizziness, altered mental status, or cool/clammy skin. In addition, monitor for signs/symptoms of high blood glucose, such as, feeling hot, dry, fatigued, or with increased thirst/urination. Make regular podiatry appointments in order to have feet checked for wounds and uncontrolled toe nail growth to prevent infections, as well as, appointments with an ophthalmologist to monitor your vision.

## 2020-09-23 NOTE — DISCHARGE NOTE PROVIDER - HOSPITAL COURSE
79 yo woman, Episcopalian, with history of ESRD on HD (MWF, via R chest teagan), DM2 (on insulin), HTN, moderate aortic stenosis (LEVI 1.16 cm2 on TTE in 2/2020), and gout presents following a syncopal episode on Friday evening, admitted for syncopal workup.    Hospital course:  Pt admitted for syncope. CTH and CT C-spine negative for any concerning acute pathology, shows mild L frontoparietal scalp hematoma. No visible scalp hematoma or TTP on exam. Orthostatics neg. EKG with possibly new TWI in II, III, and aVF. However, trops 74 -> 65. EKG did not show signs of atrial fibrillation or other arrhytmia. Echo 9/20- Aortic valve not well visualized; appears calcified with likely significant stenosis. Peak transaortic valve gradient equals 30 mm Hg, mean transaortic valve gradient equals 19 mm Hg. Moderate segmental left ventricular systolic dysfunction. Basal inferior, basal inferolateral, basal inferoseptal wall hypokinesis. 9/21 RHC PAsat 70%, CO 6.7, CI 3.6, PA 30/11, LEVI 1.11, RFV access; LHC p/mRCA 70, IFR positive - med mgmt; RFA perclose closure device. EP following and stated likely AS contributing factor to syncope and collapse, her syncope occurred after HD and her shifting volume status may have been contributing factors to syncope and collapse that day. HR and BP well controlled, no need for BB. Pt got ILR placed 9/22. Pt has follow-up appointment in the device clinic on Monday 10/5/2020 at 11:15am  4th floor Oncology Building [phone]. Vascular surgery and renal were following for ESRD. AVF mature and able to use. DC'ing off insulin since HgA1C 5.4 and FS have been stable in the hospital.     **Incomplete 81 yo woman, Taoism, with history of ESRD on HD (MWF, via R chest teagan), DM2 (on insulin), HTN, moderate aortic stenosis (LEIV 1.16 cm2 on TTE in 2/2020), and gout presents following a syncopal episode on Friday evening, admitted for syncopal workup.    Hospital course:  Pt admitted for syncope. CTH and CT C-spine negative for any concerning acute pathology, shows mild L frontoparietal scalp hematoma. No visible scalp hematoma or TTP on exam. Orthostatics neg. EKG with possibly new TWI in II, III, and aVF. However, trops 74 -> 65. EKG did not show signs of atrial fibrillation or other arrhytmia. Echo 9/20- Aortic valve not well visualized; appears calcified with likely significant stenosis. Peak transaortic valve gradient equals 30 mm Hg, mean transaortic valve gradient equals 19 mm Hg. Moderate segmental left ventricular systolic dysfunction. Basal inferior, basal inferolateral, basal inferoseptal wall hypokinesis. 9/21 RHC PAsat 70%, CO 6.7, CI 3.6, PA 30/11, LEVI 1.11, RFV access; LHC p/mRCA 70, IFR positive - med mgmt; RFA perclose closure device. EP following and stated likely AS contributing factor to syncope and collapse, her syncope occurred after HD and her shifting volume status may have been contributing factors to syncope and collapse that day. HR and BP well controlled, no need for BB. Pt got ILR placed 9/22. Pt has follow-up appointment in the device clinic on Monday 10/5/2020 at 11:15am  4th floor Oncology Building [phone]. Vascular surgery and renal were following for ESRD. AVF mature and able to use. DC'ing off insulin since HgA1C 5.4 and FS have been stable in the hospital.     Discussed case with Dr. Joshi, pt cleared for discharge home today. 81 yo woman, Temple, with history of ESRD on HD (MWF, via R chest teagan), DM2 (on insulin), HTN, moderate aortic stenosis (LEVI 1.16 cm2 on TTE in 2/2020), and gout presents following a syncopal episode on Friday evening, admitted for syncopal workup.    Hospital course:  Pt admitted for syncope. CTH and CT C-spine negative for any concerning acute pathology, shows mild L frontoparietal scalp hematoma. No visible scalp hematoma or TTP on exam. Orthostatics neg. EKG with possibly new TWI in II, III, and aVF. However, trops 74 -> 65. EKG did not show signs of atrial fibrillation or other arrhytmia. Echo 9/20- Aortic valve not well visualized; appears calcified with likely significant stenosis. Peak transaortic valve gradient equals 30 mm Hg, mean transaortic valve gradient equals 19 mm Hg. Moderate segmental left ventricular systolic dysfunction. Basal inferior, basal inferolateral, basal inferoseptal wall hypokinesis. 9/21 RHC PAsat 70%, CO 6.7, CI 3.6, PA 30/11, LEVI 1.11, RFV access; LHC p/mRCA 70, IFR positive - med mgmt; RFA perclose closure device. EP following and stated likely AS contributing factor to syncope and collapse, her syncope occurred after HD and her shifting volume status may have been contributing factors to syncope and collapse that day. HR and BP well controlled, no need for BB. Pt got ILR placed 9/22. Pt has follow-up appointment in the device clinic on Monday 10/15/2020 at 10am  4th floor Oncology Building [phone]. Vascular surgery and renal were following for ESRD. AVF mature and able to use. DC'ing off insulin since HgA1C 5.4 and FS have been stable in the hospital.     As per EP ok to start ASA - site stable - no hematoma on 9/24    Discussed case with Dr. Joshi, pt cleared for discharge home today.

## 2020-09-24 ENCOUNTER — TRANSCRIPTION ENCOUNTER (OUTPATIENT)
Age: 80
End: 2020-09-24

## 2020-09-24 VITALS
RESPIRATION RATE: 15 BRPM | OXYGEN SATURATION: 98 % | HEART RATE: 77 BPM | SYSTOLIC BLOOD PRESSURE: 103 MMHG | TEMPERATURE: 98 F | DIASTOLIC BLOOD PRESSURE: 42 MMHG

## 2020-09-24 LAB
ANION GAP SERPL CALC-SCNC: 22 MMO/L — HIGH (ref 7–14)
BUN SERPL-MCNC: 39 MG/DL — HIGH (ref 7–23)
CALCIUM SERPL-MCNC: 9.1 MG/DL — SIGNIFICANT CHANGE UP (ref 8.4–10.5)
CHLORIDE SERPL-SCNC: 96 MMOL/L — LOW (ref 98–107)
CO2 SERPL-SCNC: 18 MMOL/L — LOW (ref 22–31)
CREAT SERPL-MCNC: 6.25 MG/DL — HIGH (ref 0.5–1.3)
CULTURE RESULTS: SIGNIFICANT CHANGE UP
GLUCOSE BLDC GLUCOMTR-MCNC: 125 MG/DL — HIGH (ref 70–99)
GLUCOSE BLDC GLUCOMTR-MCNC: 141 MG/DL — HIGH (ref 70–99)
GLUCOSE BLDC GLUCOMTR-MCNC: 91 MG/DL — SIGNIFICANT CHANGE UP (ref 70–99)
GLUCOSE SERPL-MCNC: 107 MG/DL — HIGH (ref 70–99)
MAGNESIUM SERPL-MCNC: 2.2 MG/DL — SIGNIFICANT CHANGE UP (ref 1.6–2.6)
PHOSPHATE SERPL-MCNC: 4.8 MG/DL — HIGH (ref 2.5–4.5)
POTASSIUM SERPL-MCNC: 5.7 MMOL/L — HIGH (ref 3.5–5.3)
POTASSIUM SERPL-SCNC: 5.7 MMOL/L — HIGH (ref 3.5–5.3)
SODIUM SERPL-SCNC: 136 MMOL/L — SIGNIFICANT CHANGE UP (ref 135–145)
SPECIMEN SOURCE: SIGNIFICANT CHANGE UP

## 2020-09-24 PROCEDURE — 99232 SBSQ HOSP IP/OBS MODERATE 35: CPT

## 2020-09-24 RX ORDER — METOPROLOL TARTRATE 50 MG
1 TABLET ORAL
Qty: 0 | Refills: 0 | DISCHARGE

## 2020-09-24 RX ORDER — ERYTHROPOIETIN 10000 [IU]/ML
4000 INJECTION, SOLUTION INTRAVENOUS; SUBCUTANEOUS
Refills: 0 | Status: DISCONTINUED | OUTPATIENT
Start: 2020-09-24 | End: 2020-09-24

## 2020-09-24 RX ORDER — SEVELAMER CARBONATE 2400 MG/1
1 POWDER, FOR SUSPENSION ORAL
Qty: 90 | Refills: 0
Start: 2020-09-24 | End: 2020-10-23

## 2020-09-24 RX ORDER — HYDRALAZINE HCL 50 MG
1 TABLET ORAL
Qty: 0 | Refills: 0 | DISCHARGE

## 2020-09-24 RX ORDER — INSULIN ASPART 100 [IU]/ML
20 INJECTION, SOLUTION SUBCUTANEOUS
Qty: 0 | Refills: 0 | DISCHARGE

## 2020-09-24 RX ORDER — ISOSORBIDE MONONITRATE 60 MG/1
1 TABLET, EXTENDED RELEASE ORAL
Qty: 0 | Refills: 0 | DISCHARGE

## 2020-09-24 RX ORDER — ERGOCALCIFEROL 1.25 MG/1
1 CAPSULE ORAL
Qty: 0 | Refills: 0 | DISCHARGE

## 2020-09-24 RX ORDER — CALCITRIOL 0.5 UG/1
1 CAPSULE ORAL
Qty: 0 | Refills: 0 | DISCHARGE

## 2020-09-24 RX ORDER — ERYTHROPOIETIN 10000 [IU]/ML
4000 INJECTION, SOLUTION INTRAVENOUS; SUBCUTANEOUS
Qty: 0 | Refills: 0 | DISCHARGE
Start: 2020-09-24

## 2020-09-24 RX ORDER — METOPROLOL TARTRATE 50 MG
1 TABLET ORAL
Qty: 30 | Refills: 0
Start: 2020-09-24 | End: 2020-10-23

## 2020-09-24 RX ORDER — SODIUM BICARBONATE 1 MEQ/ML
1 SYRINGE (ML) INTRAVENOUS
Qty: 0 | Refills: 0 | DISCHARGE

## 2020-09-24 RX ORDER — NIFEDIPINE 30 MG
1 TABLET, EXTENDED RELEASE 24 HR ORAL
Qty: 0 | Refills: 0 | DISCHARGE

## 2020-09-24 RX ADMIN — SEVELAMER CARBONATE 800 MILLIGRAM(S): 2400 POWDER, FOR SUSPENSION ORAL at 17:19

## 2020-09-24 RX ADMIN — Medication 25 MILLIGRAM(S): at 05:06

## 2020-09-24 RX ADMIN — SEVELAMER CARBONATE 800 MILLIGRAM(S): 2400 POWDER, FOR SUSPENSION ORAL at 08:35

## 2020-09-24 RX ADMIN — SEVELAMER CARBONATE 800 MILLIGRAM(S): 2400 POWDER, FOR SUSPENSION ORAL at 12:00

## 2020-09-24 RX ADMIN — CHLORHEXIDINE GLUCONATE 1 APPLICATION(S): 213 SOLUTION TOPICAL at 11:55

## 2020-09-24 RX ADMIN — CYPROHEPTADINE HYDROCHLORIDE 4 MILLIGRAM(S): 4 TABLET ORAL at 05:06

## 2020-09-24 RX ADMIN — Medication 325 MILLIGRAM(S): at 12:00

## 2020-09-24 NOTE — PROGRESS NOTE ADULT - PROVIDER SPECIALTY LIST ADULT
Cardiology
Electrophysiology
Internal Medicine
Nephrology
Vascular Surgery
Electrophysiology
Internal Medicine

## 2020-09-24 NOTE — PROGRESS NOTE ADULT - ATTENDING COMMENTS
Patient was seen and examined by me on 09/21/2020,interim events noted,labs and radiology studies reviewed.  Amol Joshi MD,FACC.  [address] [place] [address].  [facility].  [phone]
[facility] NEPHROLOGY  Lalo Bermudez M.D.  Gilberto Yañez D.O.  Callie Mackay M.D.  Janice Flores, MSN, ANP-C  [phone]    [address] [place] [address]
[facility] NEPHROLOGY  Lalo Bermudez M.D.  Gilberto Yañez D.O.  Callie Mackay M.D.  Janice Flores, MSN, ANP-C  [phone]    [address] [place] [address]
[facility] NEPHROLOGY  Lalo Bermudez M.D.  Gilberto Yañez D.O.  Callie Mackay M.D.  Janice Flores, MSN, ANP-C  [phone]    [address] [place] [address]
[facility] NEPHROLOGY  Lalo Bermudez M.D.  Gilberto Yañez D.O.  Callie Mackay M.D.  Janice Flores, MSN, ANP-C  [phone]    [address] [place] [address]
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 Cath and echo showing at least mod AS. s/p ILR implant on 9/22/2020. Follow up as outpt.
81 yo woman, Taoist, with history of ESRD on HD (MWF, via R chest shilouis), DM2 (on insulin), HTN, moderate aortic stenosis (LEVI 1.16 cm2 on TTE in 2/2020), and gout presents following a syncopal episode on Friday evening, admitted for syncopal workup and to evaluate for cardiac arrythmia. Cath and echo showing at least mod AS. ILR implant tomorrow.

## 2020-09-24 NOTE — DISCHARGE NOTE NURSING/CASE MANAGEMENT/SOCIAL WORK - NSDCFUADDAPPT_GEN_ALL_CORE_FT
Please follow up in the device clinic on Monday 10/5/2020 at 11:15am  4th floor Oncology Building [phone].  Please follow up with Dr. Joshi.  Please follow up with your PCP.

## 2020-09-24 NOTE — DISCHARGE NOTE NURSING/CASE MANAGEMENT/SOCIAL WORK - PATIENT PORTAL LINK FT
You can access the FollowMyHealth Patient Portal offered by [facility] by registering at the following website: [email or web address]. By joining [employer]’s FollowMyHealth portal, you will also be able to view your health information using other applications (apps) compatible with our system.

## 2020-09-24 NOTE — PROGRESS NOTE ADULT - ASSESSMENT
79 yo woman, Sabianist, with history of ESRD on HD (MWF, via R mckenna candelaria), DM2 (on insulin), HTN, moderate aortic stenosis (LEVI 1.16 cm2 on TTE in 2/2020), gout and prior syncopal episodes x2 presented after another syncopal episode on Friday evening.  Echo revealed moderate LV systolic dysfunction and calcifies aortic valve with likely severe aortic stenosis.  Telemetry without tachy or bradyarrhythmia seen so far.          -Continue care per primary team  -Discussed with Dr. Caldera, will hold off loop recorder implantation for now, patient is for [facility] today  -Continue telemetry monitoring, possible ILR to follow    
79 yo Female with history of ESRD on HD presents s/p syncopal episode. Nephrology consulted for ESRD status.    1) ESRD: Last HD on 9/18/20 as an outpatient. Plan for next maintenance HD today via [facility] TD. Follow up vascular surgery regarding maturation of AVF. Monitor electrolytes.  2) HTN with ESRD: BP acceptable off antihypertensive medications. Monitor BP.  3) Anemia of renal disease: Hb acceptable. Continue with Epo 6K with HD. Pt is Baptism; No PRBC transfusion. Monitor Hb.  4) Hyperphosphatemia: Phosphorus acceptable. Continue with renvela with meals and renal diet. Monitor serum calcium and phosphorus.  
80F, Yazidi w/ hx of ESRD (via R chest tunneled catheter), DM2, HTN, moderate AS, s/p L AVF creation at OSH, presents after syncope at home. Vascular surgery consulted to assess AVF readiness to be used for HD    Plan:  - duplex US of LUE for hemodialysis access reviewed  - can attempt hemodialysis with AVF  - appreciate nephro and primary team recs    - Plan discussed with vascular surgery fellow    C TEAM SURGERY  [id number]
81 yo Female with history of ESRD on HD presents s/p syncopal episode. Nephrology consulted for ESRD status.    1) ESRD: Last HD on 9/21/20 tolerated well with 1.2L removed. Plan for next maintenance HD today via [facility] TD. Follow up vascular surgery regarding maturation of AVF (can be done as outpatient if patient to be discharged). Monitor electrolytes.  2) HTN with ESRD: BP acceptable. Metoprolol as per cardiology. Monitor BP.  3) Anemia of renal disease: Hb acceptable. Hold Epo 6K with HD. Pt is Presybeterian; No PRBC transfusion. Monitor Hb.  4) Hyperphosphatemia: Phosphorus acceptable. Continue with renvela with meals and renal diet. Monitor serum calcium and phosphorus.  
81 yo Female with history of ESRD on HD presents s/p syncopal episode. Nephrology consulted for ESRD status.    1) ESRD: Last HD on 9/23/20 tolerated well with 1.6L removed. Plan for next maintenance HD on 9/25. Monitor electrolytes.  2) HTN with ESRD: BP acceptable. Metoprolol as per cardiology. Monitor BP.  3) Anemia of renal disease: Hb acceptable. Resume Epo at lower dose (4K with HD). Pt is Latter-day; No PRBC transfusion. Monitor Hb.  4) Hyperphosphatemia: Phosphorus acceptable. Continue with renvela with meals and renal diet. Monitor serum calcium and phosphorus.  
81 yo Female with history of ESRD on HD presents s/p syncopal episode. Nephrology consulted for ESRD status.    1) ESRD: Last HD yesterday. Continue with maintenance hemodialysis treatment. Monitor BMP. Access: RIJ TDC. Follow up vascular surgery regarding maturation of AVF. Monitor electrolytes.  2) HTN with ESRD: BP acceptable off antihypertensive medications. Monitor BP.  3) Anemia of renal disease: Hb acceptable. Continue with Epo 6K with HD. Pt is Alevism; No PRBC transfusion. Monitor Hb.  4) Hyperphosphatemia: Phosphorus acceptable. Continue with renvela with meals and renal diet. Monitor serum calcium and phosphorus.  
81 yo woman, Anabaptism, with history of ESRD on HD (MWF, via R mckenna candelraia), DM2 (on insulin), HTN, moderate aortic stenosis (LEVI 1.16 cm2 on TTE in 2/2020), and gout presents following a syncopal episode on Friday evening, admitted for syncopal workup.
81 yo woman, Baptist, with history of ESRD on HD (MWF, via R mckenna candelaria), DM2 (on insulin), HTN, moderate aortic stenosis (LEVI 1.16 cm2 on TTE in 2/2020), and gout presents following a syncopal episode on Friday evening, admitted for syncopal workup.
81 yo woman, Congregational, with history of ESRD on HD (MWF, via R mckenna candelaria), DM2 (on insulin), HTN, moderate aortic stenosis (LEVI 1.16 cm2 on TTE in 2/2020), and gout presents following a syncopal episode on Friday evening, admitted for syncopal workup.
81 yo woman, Sabianist, with history of ESRD on HD (MWF, via R mckenna candelaria), DM2 (on insulin), HTN, moderate aortic stenosis (LEVI 1.16 cm2 on TTE in 2/2020), and gout presents following a syncopal episode on Friday evening, admitted for syncopal workup.
This is a 80 year old Bahai woman pmhx of ESRD on HD (MWF, via R chest teagan), DM2 (on insulin), HTN, moderate aortic stenosis (LEVI 1.16 cm2 on TTE in 2/2020), and gout presents following a syncopal episode on Friday evening, admitted for syncopal workup and to evaluate for cardiac arrythmia. Cath and echo showing at least mod AS. s/p ILR implant on 9/22/2020.     Plan  - Continuous telemetric monitoring  - Monitor electrolytes   - Monitor ILR site for hematoma  - Post-op ILR instruction has been verbal explain and given to the patient. Patient expressed understanding and all questions were answered   - Patient is schedule for an appointment on 10/15/2020 at 10:00am  - No scrubbing the incision site for 2 weeks  - No lotion, ointment, powder or direct sunlight to the incision site for 2 weeks    - Pt was instructed to call [phone] if the following occurs:      - fever with temperature > 100.6      - swelling, drainage or bleeding at the site incision       Aida Bell PA-C    
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

## 2020-09-24 NOTE — PROGRESS NOTE ADULT - SUBJECTIVE AND OBJECTIVE BOX
Patient is a 80y old  Female who presents with a chief complaint of Syncope (21 Sep 2020 07:53)    Patient denies CP, SOB, palpitations or dizziness   PAST MEDICAL & SURGICAL HISTORY:  Bilateral cataracts    Diabetic retinopathy    Gout    Hypertension    Aortic stenosis    DM (diabetes mellitus)    ESRD (end stage renal disease) on dialysis    History of hysterectomy    AV fistula    No significant past surgical history        MEDICATIONS  (STANDING):  chlorhexidine 4% Liquid 1 Application(s) Topical <User Schedule>  dextrose 5%. 1000 milliLiter(s) (50 mL/Hr) IV Continuous <Continuous>  dextrose 50% Injectable 12.5 Gram(s) IV Push once  dextrose 50% Injectable 25 Gram(s) IV Push once  dextrose 50% Injectable 25 Gram(s) IV Push once  epoetin shay-epbx (RETACRIT) Injectable 6000 Unit(s) IV Push <User Schedule>  heparin   Injectable 5000 Unit(s) SubCutaneous every 8 hours  influenza   Vaccine 0.5 milliLiter(s) IntraMuscular once  insulin lispro (HumaLOG) corrective regimen sliding scale   SubCutaneous three times a day before meals  insulin lispro (HumaLOG) corrective regimen sliding scale   SubCutaneous at bedtime  sevelamer carbonate 800 milliGRAM(s) Oral three times a day with meals    MEDICATIONS  (PRN):  dextrose 40% Gel 15 Gram(s) Oral once PRN Blood Glucose LESS THAN 70 milliGRAM(s)/deciliter  glucagon  Injectable 1 milliGRAM(s) IntraMuscular once PRN Glucose LESS THAN 70 milligrams/deciliter            Vital Signs Last 24 Hrs  T(C): 36.8 (21 Sep 2020 06:48), Max: 36.8 (21 Sep 2020 06:48)  T(F): 98.2 (21 Sep 2020 06:48), Max: 98.2 (21 Sep 2020 06:48)  HR: 68 (21 Sep 2020 06:48) (68 - 84)  BP: 143/66 (21 Sep 2020 06:48) (104/55 - 143/66)  BP(mean): --  RR: 18 (21 Sep 2020 06:48) (17 - 18)  SpO2: 98% (21 Sep 2020 06:48) (98% - 100%)            INTERPRETATION OF TELEMETRY:  SR with APC's; no PAF seen    ECG:        LABS:                        10.4   7.62  )-----------( 125      ( 20 Sep 2020 06:16 )             34.4     09-20    138  |  95<L>  |  48<H>  ----------------------------<  157<H>  4.1   |  22  |  7.43<H>    Ca    9.1      20 Sep 2020 06:16  Phos  5.4       Mg     2.1                 Urinalysis Basic - ( 20 Sep 2020 14:00 )    Color: YELLOW / Appearance: TURBID / S.017 / pH: 6.5  Gluc: NEGATIVE / Ketone: NEGATIVE  / Bili: NEGATIVE / Urobili: NORMAL   Blood: SMALL / Protein: 300 / Nitrite: NEGATIVE   Leuk Esterase: LARGE / RBC: 0-2 / WBC >50   Sq Epi: MODERATE / Non Sq Epi: x / Bacteria: MANY        BNP  RADIOLOGY & ADDITIONAL STUDIES:    OBSERVATIONS:  Mitral Valve: Mitral annular calcification, otherwise  normal mitral valve.  Aortic Root: Normal aortic root.  Aortic Valve: Aortic valve not well visualized; appears  calcified with likely significant stenosis. Peak  transaortic valve gradient equals 30 mm Hg, mean  transaortic valve gradient equals 19 mm Hg.  Left Atrium: Normal left atrium.  Left Ventricle: Endocardial visualization enhanced with  intravenous injection of echo contrast (Definity). Moderate  segmental left ventricular systolic dysfunction. Basal  inferior, basal inferolateral, basal inferoseptal wall  hypokinesis. Normal left ventricular internal dimensions  and wall thicknesses. (DT:213 ms).  Right Heart: Normal right atrium. Normal right ventricular  size and function. Normal tricuspid valve. Normal pulmonic  valve.  Pericardium/PleuraNormal pericardium with no pericardial  effusion.  ------------------------------------------------------------------------  CONCLUSIONS:  1. Aortic valve not well visualized; appears calcified with  likely significant stenosis. Peak transaortic valve  gradient equals 30 mm Hg, mean transaortic valve gradient  equals 19 mm Hg.  2. Endocardial visualization enhanced with intravenous  injection of echo contrast (Definity). Moderate segmental  left ventricular systolic dysfunction. Basal inferior,  basal inferolateral, basal inferoseptal wall hypokinesis.  3. Normal right ventricular size and function.  ------------------------------------------------------------------------  Confirmed on  2020 - 12:12:26 by NEETA Adams    PHYSICAL EXAM:    GENERAL: In no apparent distress, well nourished, and hydrated.  NECK: Supple and normal thyroid.  No JVD or carotid bruit.  Carotid pulse is 2+ bilaterally.  HEART: Regular rate and rhythm; 2-3/6 CANDY, no rubs, or gallops.  R chest wall dialysis catheter; L UE AV fistula  PULMONARY: Clear to auscultation and perfusion.  No rales, wheezing, or rhonchi bilaterally.  ABDOMEN: Soft, Nontender, Nondistended; Bowel sounds present  EXTREMITIES:  2+ Peripheral Pulses, No clubbing, cyanosis, or edema  NEUROLOGICAL: Grossly nonfocal        
    SUBJECTIVE / OVERNIGHT EVENTS: pt denies chest pain, shortness of breath     MEDICATIONS  (STANDING):  atorvastatin 20 milliGRAM(s) Oral at bedtime  chlorhexidine 4% Liquid 1 Application(s) Topical <User Schedule>  cyproheptadine 4 milliGRAM(s) Oral <User Schedule>  dextrose 5%. 1000 milliLiter(s) (50 mL/Hr) IV Continuous <Continuous>  dextrose 50% Injectable 12.5 Gram(s) IV Push once  dextrose 50% Injectable 25 Gram(s) IV Push once  dextrose 50% Injectable 25 Gram(s) IV Push once  epoetin shay-epbx (RETACRIT) Injectable 4000 Unit(s) IV Push <User Schedule>  ferrous    sulfate 325 milliGRAM(s) Oral daily  influenza   Vaccine 0.5 milliLiter(s) IntraMuscular once  insulin lispro (HumaLOG) corrective regimen sliding scale   SubCutaneous three times a day before meals  insulin lispro (HumaLOG) corrective regimen sliding scale   SubCutaneous at bedtime  metoprolol succinate ER 25 milliGRAM(s) Oral daily  sevelamer carbonate 800 milliGRAM(s) Oral three times a day with meals    MEDICATIONS  (PRN):  dextrose 40% Gel 15 Gram(s) Oral once PRN Blood Glucose LESS THAN 70 milliGRAM(s)/deciliter  glucagon  Injectable 1 milliGRAM(s) IntraMuscular once PRN Glucose LESS THAN 70 milligrams/deciliter    Vital Signs Last 24 Hrs  T(C): 36.5 (24 Sep 2020 17:00), Max: 36.8 (24 Sep 2020 00:35)  T(F): 97.7 (24 Sep 2020 17:00), Max: 98.2 (24 Sep 2020 00:35)  HR: 77 (24 Sep 2020 17:00) (73 - 84)  BP: 103/42 (24 Sep 2020 17:00) (91/60 - 135/60)  BP(mean): --  RR: 15 (24 Sep 2020 17:00) (15 - 18)  SpO2: 98% (24 Sep 2020 17:00) (97% - 100%)      Constitutional: No fever, fatigue  Skin: No rash.  Eyes: No recent vision problems or eye pain.  ENT: No congestion, ear pain, or sore throat.  Cardiovascular: No chest pain or palpation.  Respiratory: No cough, shortness of breath, congestion, or wheezing.  Gastrointestinal: No abdominal pain, nausea, vomiting, or diarrhea.  Genitourinary: No dysuria.  Musculoskeletal: No joint swelling.  Neurologic: No headache.    PHYSICAL EXAM:  GENERAL: NAD  EYES: EOMI, PERRLA  NECK: Supple, No JVD  CHEST/LUNG: dec breath sounds at bases   HEART:  S1 , S2 +  ABDOMEN: soft , bs+  EXTREMITIES:  trace edema  NEUROLOGY:alert awake    LABS:      136  |  96<L>  |  39<H>  ----------------------------<  107<H>  5.7<H>   |  18<L>  |  6.25<H>    Ca    9.1      24 Sep 2020 06:19  Phos  4.8       Mg     2.2           Creatinine Trend: 6.25 <--, 8.24 <--, 6.69 <--, 7.43 <--, 5.05 <--, 4.45 <--                        9.5    6.31  )-----------( 127      ( 23 Sep 2020 16:15 )             30.8     Urine Studies:  Urinalysis Basic - ( 20 Sep 2020 14:00 )    Color: YELLOW / Appearance: TURBID / S.017 / pH: 6.5  Gluc: NEGATIVE / Ketone: NEGATIVE  / Bili: NEGATIVE / Urobili: NORMAL   Blood: SMALL / Protein: 300 / Nitrite: NEGATIVE   Leuk Esterase: LARGE / RBC: 0-2 / WBC >50   Sq Epi: MODERATE / Non Sq Epi:  / Bacteria: MANY                          
    SUBJECTIVE / OVERNIGHT EVENTS: pt denies chest pain, shortness of breath     MEDICATIONS  (STANDING):  atorvastatin 20 milliGRAM(s) Oral at bedtime  chlorhexidine 4% Liquid 1 Application(s) Topical <User Schedule>  cyproheptadine 4 milliGRAM(s) Oral <User Schedule>  dextrose 5%. 1000 milliLiter(s) (50 mL/Hr) IV Continuous <Continuous>  dextrose 50% Injectable 12.5 Gram(s) IV Push once  dextrose 50% Injectable 25 Gram(s) IV Push once  dextrose 50% Injectable 25 Gram(s) IV Push once  epoetin shay-epbx (RETACRIT) Injectable 6000 Unit(s) IV Push <User Schedule>  ferrous    sulfate 325 milliGRAM(s) Oral daily  heparin   Injectable 5000 Unit(s) SubCutaneous every 8 hours  influenza   Vaccine 0.5 milliLiter(s) IntraMuscular once  insulin lispro (HumaLOG) corrective regimen sliding scale   SubCutaneous three times a day before meals  insulin lispro (HumaLOG) corrective regimen sliding scale   SubCutaneous at bedtime  metoprolol succinate ER 25 milliGRAM(s) Oral daily  sevelamer carbonate 800 milliGRAM(s) Oral three times a day with meals    MEDICATIONS  (PRN):  dextrose 40% Gel 15 Gram(s) Oral once PRN Blood Glucose LESS THAN 70 milliGRAM(s)/deciliter  glucagon  Injectable 1 milliGRAM(s) IntraMuscular once PRN Glucose LESS THAN 70 milligrams/deciliter    Vital Signs Last 24 Hrs  T(C): 36.7 (22 Sep 2020 21:02), Max: 36.8 (22 Sep 2020 10:45)  T(F): 98 (22 Sep 2020 21:02), Max: 98.3 (22 Sep 2020 10:45)  HR: 77 (22 Sep 2020 21:02) (72 - 108)  BP: 126/60 (22 Sep 2020 21:02) (108/74 - 146/64)  BP(mean): --  RR: 18 (22 Sep 2020 21:02) (18 - 18)  SpO2: 98% (22 Sep 2020 21:02) (97% - 100%)    Constitutional: No fever, fatigue  Skin: No rash.  Eyes: No recent vision problems or eye pain.  ENT: No congestion, ear pain, or sore throat.  Cardiovascular: No chest pain or palpation.  Respiratory: No cough, shortness of breath, congestion, or wheezing.  Gastrointestinal: No abdominal pain, nausea, vomiting, or diarrhea.  Genitourinary: No dysuria.  Musculoskeletal: No joint swelling.  Neurologic: No headache.    PHYSICAL EXAM:  GENERAL: NAD  EYES: EOMI, PERRLA  NECK: Supple, No JVD  CHEST/LUNG: dec breath sounds at bases   HEART:  S1 , S2 +  ABDOMEN: soft , bs+  EXTREMITIES:  trace edema  NEUROLOGY:alert awake    LABS:      136  |  93<L>  |  40<H>  ----------------------------<  218<H>  4.0   |  23  |  6.69<H>    Ca    9.2      22 Sep 2020 12:03  Phos  4.5       Mg     2.0           Creatinine Trend: 6.69 <--, 7.43 <--, 5.05 <--, 4.45 <--                        11.3   8.10  )-----------( 124      ( 22 Sep 2020 12:03 )             36.8     Urine Studies:  Urinalysis Basic - ( 20 Sep 2020 14:00 )    Color: YELLOW / Appearance: TURBID / S.017 / pH: 6.5  Gluc: NEGATIVE / Ketone: NEGATIVE  / Bili: NEGATIVE / Urobili: NORMAL   Blood: SMALL / Protein: 300 / Nitrite: NEGATIVE   Leuk Esterase: LARGE / RBC: 0-2 / WBC >50   Sq Epi: MODERATE / Non Sq Epi:  / Bacteria: MANY                  
    SUBJECTIVE / OVERNIGHT EVENTS: pt denies chest pain, shortness of breath     MEDICATIONS  (STANDING):  atorvastatin 20 milliGRAM(s) Oral at bedtime  chlorhexidine 4% Liquid 1 Application(s) Topical <User Schedule>  cyproheptadine 4 milliGRAM(s) Oral <User Schedule>  dextrose 5%. 1000 milliLiter(s) (50 mL/Hr) IV Continuous <Continuous>  dextrose 50% Injectable 12.5 Gram(s) IV Push once  dextrose 50% Injectable 25 Gram(s) IV Push once  dextrose 50% Injectable 25 Gram(s) IV Push once  epoetin shay-epbx (RETACRIT) Injectable 6000 Unit(s) IV Push <User Schedule>  ferrous    sulfate 325 milliGRAM(s) Oral daily  heparin   Injectable 5000 Unit(s) SubCutaneous every 8 hours  influenza   Vaccine 0.5 milliLiter(s) IntraMuscular once  insulin lispro (HumaLOG) corrective regimen sliding scale   SubCutaneous three times a day before meals  insulin lispro (HumaLOG) corrective regimen sliding scale   SubCutaneous at bedtime  metoprolol succinate ER 25 milliGRAM(s) Oral daily  sevelamer carbonate 800 milliGRAM(s) Oral three times a day with meals    MEDICATIONS  (PRN):  dextrose 40% Gel 15 Gram(s) Oral once PRN Blood Glucose LESS THAN 70 milliGRAM(s)/deciliter  glucagon  Injectable 1 milliGRAM(s) IntraMuscular once PRN Glucose LESS THAN 70 milligrams/deciliter    Vital Signs Last 24 Hrs  T(C): 36.7 (22 Sep 2020 21:02), Max: 36.8 (22 Sep 2020 10:45)  T(F): 98 (22 Sep 2020 21:02), Max: 98.3 (22 Sep 2020 10:45)  HR: 77 (22 Sep 2020 21:02) (72 - 108)  BP: 126/60 (22 Sep 2020 21:02) (108/74 - 146/64)  BP(mean): --  RR: 18 (22 Sep 2020 21:02) (18 - 18)  SpO2: 98% (22 Sep 2020 21:02) (97% - 100%)    Constitutional: No fever, fatigue  Skin: No rash.  Eyes: No recent vision problems or eye pain.  ENT: No congestion, ear pain, or sore throat.  Cardiovascular: No chest pain or palpation.  Respiratory: No cough, shortness of breath, congestion, or wheezing.  Gastrointestinal: No abdominal pain, nausea, vomiting, or diarrhea.  Genitourinary: No dysuria.  Musculoskeletal: No joint swelling.  Neurologic: No headache.    PHYSICAL EXAM:  GENERAL: NAD  EYES: EOMI, PERRLA  NECK: Supple, No JVD  CHEST/LUNG: dec breath sounds at bases   HEART:  S1 , S2 +  ABDOMEN: soft , bs+  EXTREMITIES:  trace edema  NEUROLOGY:alert awake    LABS:      136  |  93<L>  |  40<H>  ----------------------------<  218<H>  4.0   |  23  |  6.69<H>    Ca    9.2      22 Sep 2020 12:03  Phos  4.5       Mg     2.0           Creatinine Trend: 6.69 <--, 7.43 <--, 5.05 <--, 4.45 <--                        11.3   8.10  )-----------( 124      ( 22 Sep 2020 12:03 )             36.8     Urine Studies:  Urinalysis Basic - ( 20 Sep 2020 14:00 )    Color: YELLOW / Appearance: TURBID / S.017 / pH: 6.5  Gluc: NEGATIVE / Ketone: NEGATIVE  / Bili: NEGATIVE / Urobili: NORMAL   Blood: SMALL / Protein: 300 / Nitrite: NEGATIVE   Leuk Esterase: LARGE / RBC: 0-2 / WBC >50   Sq Epi: MODERATE / Non Sq Epi:  / Bacteria: MANY                  
    SUBJECTIVE / OVERNIGHT EVENTS: pt denies chest pain, shortness of breath     MEDICATIONS  (STANDING):  chlorhexidine 4% Liquid 1 Application(s) Topical <User Schedule>  dextrose 5%. 1000 milliLiter(s) (50 mL/Hr) IV Continuous <Continuous>  dextrose 50% Injectable 12.5 Gram(s) IV Push once  dextrose 50% Injectable 25 Gram(s) IV Push once  dextrose 50% Injectable 25 Gram(s) IV Push once  epoetin shay-epbx (RETACRIT) Injectable 6000 Unit(s) IV Push <User Schedule>  heparin   Injectable 5000 Unit(s) SubCutaneous every 8 hours  influenza   Vaccine 0.5 milliLiter(s) IntraMuscular once  insulin lispro (HumaLOG) corrective regimen sliding scale   SubCutaneous three times a day before meals  insulin lispro (HumaLOG) corrective regimen sliding scale   SubCutaneous at bedtime  sevelamer carbonate 800 milliGRAM(s) Oral three times a day with meals    MEDICATIONS  (PRN):  dextrose 40% Gel 15 Gram(s) Oral once PRN Blood Glucose LESS THAN 70 milliGRAM(s)/deciliter  glucagon  Injectable 1 milliGRAM(s) IntraMuscular once PRN Glucose LESS THAN 70 milligrams/deciliter    Vital Signs Last 24 Hrs  T(C): 36.4 (21 Sep 2020 17:20), Max: 36.8 (21 Sep 2020 06:48)  T(F): 97.5 (21 Sep 2020 17:20), Max: 98.2 (21 Sep 2020 06:48)  HR: 90 (21 Sep 2020 17:20) (68 - 90)  BP: 178/76 (21 Sep 2020 17:20) (143/66 - 178/76)  BP(mean): --  RR: 18 (21 Sep 2020 17:20) (18 - 18)  SpO2: 99% (21 Sep 2020 17:20) (98% - 99%)    Constitutional: No fever, fatigue  Skin: No rash.  Eyes: No recent vision problems or eye pain.  ENT: No congestion, ear pain, or sore throat.  Cardiovascular: No chest pain or palpation.  Respiratory: No cough, shortness of breath, congestion, or wheezing.  Gastrointestinal: No abdominal pain, nausea, vomiting, or diarrhea.  Genitourinary: No dysuria.  Musculoskeletal: No joint swelling.  Neurologic: No headache.    PHYSICAL EXAM:  GENERAL: NAD  EYES: EOMI, PERRLA  NECK: Supple, No JVD  CHEST/LUNG: dec breath sounds at bases   HEART:  S1 , S2 +  ABDOMEN: soft , bs+  EXTREMITIES:  trace edema  NEUROLOGY:alert awake      LABS:      138  |  95<L>  |  48<H>  ----------------------------<  157<H>  4.1   |  22  |  7.43<H>    Ca    9.1      20 Sep 2020 06:16  Phos  5.4       Mg     2.1           Creatinine Trend: 7.43 <--, 5.05 <--, 4.45 <--                        10.4   7.62  )-----------( 125      ( 20 Sep 2020 06:16 )             34.4     Urine Studies:  Urinalysis Basic - ( 20 Sep 2020 14:00 )    Color: YELLOW / Appearance: TURBID / S.017 / pH: 6.5  Gluc: NEGATIVE / Ketone: NEGATIVE  / Bili: NEGATIVE / Urobili: NORMAL   Blood: SMALL / Protein: 300 / Nitrite: NEGATIVE   Leuk Esterase: LARGE / RBC: 0-2 / WBC >50   Sq Epi: MODERATE / Non Sq Epi:  / Bacteria: MANY                    Imaging Personally Reviewed:    Consultant(s) Notes Reviewed:      Care Discussed with Consultants/Other Providers:  
DATE OF SERVICE:Patient was seen and examined :09/21/2020    PRESENTING CC:Syncope    SUBJ:       PMH -reviewed admission note, no change since admission  Heart failure: acute [ ] chronic [ ] acute or chronic [ ] diastolic [ ] systolic [ ] combined systolic and diastolic[ ]  KIANNA: ATN[ ] renal medullary necrosis [ ] CKD I [ ]CKDII [ ]CKD III [ ]CKD IV [ ]CKD V [ ]Other pathological lesions [ ]ESRD[x]    MEDICATIONS  (STANDING):  chlorhexidine 4% Liquid 1 Application(s) Topical <User Schedule>  dextrose 5%. 1000 milliLiter(s) (50 mL/Hr) IV Continuous <Continuous>  dextrose 50% Injectable 12.5 Gram(s) IV Push once  dextrose 50% Injectable 25 Gram(s) IV Push once  dextrose 50% Injectable 25 Gram(s) IV Push once  epoetin shay-epbx (RETACRIT) Injectable 6000 Unit(s) IV Push <User Schedule>  heparin   Injectable 5000 Unit(s) SubCutaneous every 8 hours  influenza   Vaccine 0.5 milliLiter(s) IntraMuscular once  insulin lispro (HumaLOG) corrective regimen sliding scale   SubCutaneous three times a day before meals  insulin lispro (HumaLOG) corrective regimen sliding scale   SubCutaneous at bedtime  sevelamer carbonate 800 milliGRAM(s) Oral three times a day with meals    MEDICATIONS  (PRN):  dextrose 40% Gel 15 Gram(s) Oral once PRN Blood Glucose LESS THAN 70 milliGRAM(s)/deciliter  glucagon  Injectable 1 milliGRAM(s) IntraMuscular once PRN Glucose LESS THAN 70 milligrams/deciliter              REVIEW OF SYSTEMS:  Constitutional: [ ] fever, [ ]weight loss,  [ ]fatigue  Eyes: [ ] visual changes  Respiratory: [ ]shortness of breath;  [ ] cough, [ ]wheezing, [ ]chills, [ ]hemoptysis  Cardiovascular: [ ] chest pain, [ ]palpitations, [ ]dizziness,  [ ]leg swelling[ ]orthopnea[ ]PND  Gastrointestinal: [ ] abdominal pain, [ ]nausea, [ ]vomiting,  [ ]diarrhea   Genitourinary: [ ] dysuria, [ ] hematuria  Neurologic: [ ] headaches [ ] tremors[ ]weakness  Skin: [ ] itching, [ ]burning, [ ] rashes  Endocrine: [ ] heat or cold intolerance  Musculoskeletal: [ ] joint pain or swelling; [ ] muscle, back, or extremity pain  Psychiatric: [ ] depression, [ ]anxiety, [ ]mood swings, or [ ]difficulty sleeping  Hematologic: [ ] easy bruising, [ ] bleeding gums    [x] All remaining systems negative except as per above.   [ ]Unable to obtain.    Vital Signs Last 24 Hrs  T(C): 36.8 (21 Sep 2020 06:48), Max: 36.8 (21 Sep 2020 06:48)  T(F): 98.2 (21 Sep 2020 06:48), Max: 98.2 (21 Sep 2020 06:48)  HR: 68 (21 Sep 2020 06:48) (68 - 84)  BP: 143/66 (21 Sep 2020 06:48) (104/55 - 143/66)  RR: 18 (21 Sep 2020 06:48) (17 - 18)  SpO2: 98% (21 Sep 2020 06:48) (98% - 100%)  I&O's Summary      PHYSICAL EXAM:  General: No acute distress BMI-  HEENT: EOMI, PERRL  Neck: Supple, [ ] JVD  Lungs: Equal air entry bilaterally; [ ] rales [ ] wheezing [ ] rhonchi  Heart: Regular rate and rhythm; [x ] murmur   3/6 [x ] systolic [ ] diastolic [x ] radiation[ ] rubs [ ]  gallops  Abdomen: Nontender, bowel sounds present  Extremities: No clubbing, cyanosis, [ ] edema  Nervous system:  Alert & Oriented X3, no focal deficits  Psychiatric: Normal affect  Skin: No rashes or lesions    LABS:  09-20    138  |  95<L>  |  48<H>  ----------------------------<  157<H>  4.1   |  22  |  7.43<H>    Ca    9.1      20 Sep 2020 06:16  Phos  5.4     09-20  Mg     2.1     09-20      Creatinine Trend: 7.43<--, 5.05<--, 4.45<--                        10.4   7.62  )-----------( 125      ( 20 Sep 2020 06:16 )             34.4       RADIOLOGY:    ECG [my interpretation]:    TELEMETRY:    ECHO:Study Date: 9/20/2020  CONCLUSIONS:  1. Aortic valve not well visualized; appears calcified with likely significant stenosis. Peak transaorticvalve gradient equals 30 mm Hg, mean transaortic valve gradient  equals 19 mm Hg.  2. Endocardial visualization enhanced with intravenous injection of echo contrast (Definity). Moderate segmental left ventricular systolic dysfunction. Basal inferior, basal inferolateral, basal inferoseptal wall hypokinesis.  3. Normal right ventricular size and function.      IMPRESSION AND PLAN:      79 yo woman, Sikhism, with history of ESRD on HD (MWF, via R chest shiley), DM2 (on insulin), HTN, moderate aortic stenosis (LEVI 1.16 cm2 on TTE in 2/2020), and gout presents following a syncopal episode      Problem/Plan - 1:  ·  Problem: Syncope and collapse.  Plan: Unwitnessed. Per pt, no prodromal symptoms other than feeling chills. Likely in setting of aortic stenosis, possibly worsening.   - Monitor on tele for any concerning arrhythmias      Problem/Plan - 2:  ·  Problem: Aortic stenosis.  Plan: Moderate aortic stenosis (LEVI 1.16 cm2 on TTE in 2/2020).   -For cardiac cath today      Problem/Plan - 3:  ·  Problem: ESRD (end stage renal disease) on dialysis.  Plan: On HD MWF. Last HD on Friday, completed full session. Currently, no indication for urgent HD.      Problem/Plan - 4:  ·  Problem: Anemia.  Plan: Hgb 11 on admission. No S/S of active bleed. Likely 2/2 ESRD and anemia of chronic disease.   - Monitor H/H (pt is a Sikhism)      Problem/Plan - 5:  ·  Problem: Hypertension.  Plan: BPs in acceptable range.      Problem/Plan - 6:  Problem: Type 2 diabetes mellitus with chronic kidney disease on chronic dialysis, with long-term current use of insulin. Plan: On insulin at home. HgbA1c 6.4% in 2/2020.      Problem/Plan - 7:  ·  Problem: Gout.  Plan: No S/S of flare. Stable off of any meds.  - Monitor for S/S of flare.
[facility] NEPHROLOGY- PROGRESS NOTE    80y Female with history of ESRD on HD presents with syncope. Nephrology consulted for ESRD status.    Pt feels okay, no cmoplaints.    REVIEW OF SYSTEMS:  Gen: no changes in weight  Cards: no chest pain  Resp: no dyspnea  GI: no nausea or vomiting or diarrhea  Vascular: no LE edema    No Known Allergies      Hospital Medications: Medications reviewed    VITALS:  T(F): 98.3 (20 @ 10:45), Max: 98.3 (20 @ 10:45)  HR: 94 (20 @ 10:45)  BP: 146/64 (20 @ 10:45)  RR: 18 (20 @ 10:45)  SpO2: 97% (20 @ 10:45)  Wt(kg): --     @ 07:01  -   @ 07:00  --------------------------------------------------------  IN: 640 mL / OUT: 1800 mL / NET: -1160 mL    PHYSICAL EXAM:  Gen: NAD, calm  Cards: RRR, +S1/S2, + CANDY  Resp: CTA B/L  GI: soft, NT/ND, NABS  Vascular: no LE edema B/L, LUE AVF + bruit/thrill, RIJ TDC intact        LABS:      136  |  93<L>  |  40<H>  ----------------------------<  218<H>  4.0   |  23  |  6.69<H>    Ca    9.2      22 Sep 2020 12:03  Phos  4.5       Mg     2.0           Creatinine Trend: 6.69 <--, 7.43 <--, 5.05 <--, 4.45 <--                        11.3   8.10  )-----------( 124      ( 22 Sep 2020 12:03 )             36.8     Urine Studies:  Urinalysis Basic - ( 20 Sep 2020 14:00 )    Color: YELLOW / Appearance: TURBID / S.017 / pH: 6.5  Gluc: NEGATIVE / Ketone: NEGATIVE  / Bili: NEGATIVE / Urobili: NORMAL   Blood: SMALL / Protein: 300 / Nitrite: NEGATIVE   Leuk Esterase: LARGE / RBC: 0-2 / WBC >50   Sq Epi: MODERATE / Non Sq Epi:  / Bacteria: MANY          
[facility] NEPHROLOGY- PROGRESS NOTE    80y Female with history of ESRD on HD presents with syncope. Nephrology consulted for ESRD status.consul    REVIEW OF SYSTEMS:  Gen: no changes in weight  Cards: no chest pain  Resp: no dyspnea  GI: no nausea or vomiting or diarrhea  Vascular: no LE edema    No Known Allergies      Hospital Medications: Medications reviewed    VITALS:  T(F): 98.2 (20 @ 06:48), Max: 98.2 (20 @ 06:48)  HR: 68 (20 @ 06:48)  BP: 143/66 (20 @ 06:48)  RR: 18 (20 @ 06:48)  SpO2: 98% (20 @ 06:48)  Wt(kg): --  Height (cm): 160 ( 23:17)  Weight (kg): 83 ( 23:17)  BMI (kg/m2): 32.4 ( 23:17)  BSA (m2): 1.86 ( 23:17)     @ 07:01  -   @ 14:07  --------------------------------------------------------  IN: 0 mL / OUT: 100 mL / NET: -100 mL        PHYSICAL EXAM:    Gen: NAD, calm  Cards: RRR, +S1/S2, + CANDY  Resp: CTA B/L  GI: soft, NT/ND, NABS  Vascular: no LE edema B/L, LUE AVF + bruit/thrill, RIJ TDC intact    LABS:      138  |  95<L>  |  48<H>  ----------------------------<  157<H>  4.1   |  22  |  7.43<H>    Ca    9.1      20 Sep 2020 06:16  Phos  5.4     -  Mg     2.1           Creatinine Trend: 7.43 <--, 5.05 <--, 4.45 <--                        10.4   7.62  )-----------( 125      ( 20 Sep 2020 06:16 )             34.4     Urine Studies:  Urinalysis Basic - ( 20 Sep 2020 14:00 )    Color: YELLOW / Appearance: TURBID / S.017 / pH: 6.5  Gluc: NEGATIVE / Ketone: NEGATIVE  / Bili: NEGATIVE / Urobili: NORMAL   Blood: SMALL / Protein: 300 / Nitrite: NEGATIVE   Leuk Esterase: LARGE / RBC: 0-2 / WBC >50   Sq Epi: MODERATE / Non Sq Epi:  / Bacteria: MANY
[facility] NEPHROLOGY- PROGRESS NOTE    80y Female with history of ESRD on HD presents with syncope. Nephrology consulted for ESRD status.    REVIEW OF SYSTEMS:  Gen: no changes in weight  Cards: no chest pain  Resp: no dyspnea  GI: no nausea or vomiting or diarrhea  Vascular: no LE edema    No Known Allergies      Hospital Medications: Medications reviewed      VITALS:  T(F): 97.4 (20 @ 09:00), Max: 98.2 (20 @ 00:35)  HR: 73 (20 @ 09:00)  BP: 102/38 (20 @ 09:00)  RR: 17 (20 @ 09:00)  SpO2: 100% (20 @ 09:00)  Wt(kg): --     @ 07:01  -   @ 07:00  --------------------------------------------------------  IN: 1280 mL / OUT: 2500 mL / NET: -1220 mL     @ 07:01  -   @ 11:50  --------------------------------------------------------  IN: 0 mL / OUT: 100 mL / NET: -100 mL        PHYSICAL EXAM:    Gen: NAD, calm  Cards: RRR, +S1/S2, + CANDY  Resp: CTA B/L  GI: soft, NT/ND, NABS  Vascular: no LE edema B/L, LUE AVF + bruit/thrill, RIJ TDC intact        LABS:      136  |  96<L>  |  39<H>  ----------------------------<  107<H>  5.7<H>   |  18<L>  |  6.25<H>    Ca    9.1      24 Sep 2020 06:19  Phos  4.8       Mg     2.2           Creatinine Trend: 6.25 <--, 8.24 <--, 6.69 <--, 7.43 <--, 5.05 <--, 4.45 <--                        9.5    6.31  )-----------( 127      ( 23 Sep 2020 16:15 )             30.8     Urine Studies:  Urinalysis Basic - ( 20 Sep 2020 14:00 )    Color: YELLOW / Appearance: TURBID / S.017 / pH: 6.5  Gluc: NEGATIVE / Ketone: NEGATIVE  / Bili: NEGATIVE / Urobili: NORMAL   Blood: SMALL / Protein: 300 / Nitrite: NEGATIVE   Leuk Esterase: LARGE / RBC: 0-2 / WBC >50   Sq Epi: MODERATE / Non Sq Epi:  / Bacteria: MANY        
Subjective: No new complaints. Denies HA, lightheadedness, dizziness, CP, SOB, abdominal pain, N/V.      Interval events:  - s/p ILR on 9/22/2020. On 9/23/2020, patient was noted to have ecchymosis from ILR. The ecchymosis started on the left side of the dressing that goes down to the left nipple approximately 3 inches which is tender to touch but no hematoma noted. Heparin discontinued.  - S/p HD on 9/23/2020      MEDICATIONS  (STANDING):  atorvastatin 20 milliGRAM(s) Oral at bedtime  chlorhexidine 4% Liquid 1 Application(s) Topical <User Schedule>  cyproheptadine 4 milliGRAM(s) Oral <User Schedule>  dextrose 5%. 1000 milliLiter(s) (50 mL/Hr) IV Continuous <Continuous>  dextrose 50% Injectable 12.5 Gram(s) IV Push once  dextrose 50% Injectable 25 Gram(s) IV Push once  dextrose 50% Injectable 25 Gram(s) IV Push once  ferrous    sulfate 325 milliGRAM(s) Oral daily  influenza   Vaccine 0.5 milliLiter(s) IntraMuscular once  insulin lispro (HumaLOG) corrective regimen sliding scale   SubCutaneous three times a day before meals  insulin lispro (HumaLOG) corrective regimen sliding scale   SubCutaneous at bedtime  metoprolol succinate ER 25 milliGRAM(s) Oral daily  sevelamer carbonate 800 milliGRAM(s) Oral three times a day with meals    MEDICATIONS  (PRN):  dextrose 40% Gel 15 Gram(s) Oral once PRN Blood Glucose LESS THAN 70 milliGRAM(s)/deciliter  glucagon  Injectable 1 milliGRAM(s) IntraMuscular once PRN Glucose LESS THAN 70 milligrams/deciliter    Vital Signs Last 24 Hrs  T(C): 36.3 (24 Sep 2020 09:00), Max: 36.8 (24 Sep 2020 00:35)  T(F): 97.4 (24 Sep 2020 09:00), Max: 98.2 (24 Sep 2020 00:35)  HR: 73 (24 Sep 2020 09:00) (73 - 94)  BP: 102/38 (24 Sep 2020 09:00) (102/38 - 148/68)  BP(mean): --  RR: 17 (24 Sep 2020 09:00) (17 - 18)  SpO2: 100% (24 Sep 2020 09:00) (99% - 100%)  I&O's Detail    23 Sep 2020 07:01  -  24 Sep 2020 07:00  --------------------------------------------------------  IN:    Oral Fluid: 880 mL    Other (mL): 400 mL  Total IN: 1280 mL    OUT:    Other (mL): 2000 mL    Voided (mL): 500 mL  Total OUT: 2500 mL    Total NET: -1220 mL      24 Sep 2020 07:01  -  24 Sep 2020 11:36  --------------------------------------------------------  IN:  Total IN: 0 mL    OUT:    Voided (mL): 100 mL  Total OUT: 100 mL    Total NET: -100 mL    Physical Exam:  GENERAL: Lying in bed, well appearing, speaking in full sentence, in NAD  Chest: ecchymosis from the left side of the dressing that goes down to the left nipple approximately 3 inches which is tender to touch but no hematoma noted  HEART: S1S2 RRR; No murmurs, rubs, or gallops appreciated  PULMONARY: CTABL, normal respiratory effort.  No use of accessory muscles  ABDOMEN: + Bowel sounds present, soft, NDNT  EXTREMITIES:  Warm, well -perfused, no pedal edema, distal pulses present    TELEMETERIC: SR with couplet. Brief run of  at 11pm and 130 BPM at 6PM. Patient was asymptomatic       Lab post HD                        9.5   6.31  )-----------( 127      ( 23 Sep 2020 16:15 )             30.8       09-24    136  |  96<L>  |  39<H>  ----------------------------<  107<H>  5.7<H>   |  18<L>  |  6.25<H>    Ca    9.1      24 Sep 2020 06:19  Phos  4.8     09-24  Mg     2.2     09-24        
[facility] NEPHROLOGY- PROGRESS NOTE    80y Female with history of ESRD on HD presents with syncope. Nephrology consulted for ESRD status.    REVIEW OF SYSTEMS:  Gen: no changes in weight  Cards: no chest pain  Resp: no dyspnea  GI: no nausea or vomiting or diarrhea  Vascular: no LE edema    No Known Allergies      Hospital Medications: Medications reviewed      VITALS:  T(F): 98.2 (20 @ 09:35), Max: 98.3 (20 @ 10:45)  HR: 74 (20 @ 09:35)  BP: 136/68 (20 @ 09:35)  RR: 18 (20 @ 09:35)  SpO2: 100% (20 @ 09:35)  Wt(kg): --     @ 07:01  -   @ 07:00  --------------------------------------------------------  IN: 120 mL / OUT: 200 mL / NET: -80 mL        PHYSICAL EXAM:    Gen: NAD, calm  Cards: RRR, +S1/S2, + CANDY  Resp: CTA B/L  GI: soft, NT/ND, NABS  Vascular: no LE edema B/L, LUE AVF + bruit/thrill, RIJ TDC intact      LABS:      136  |  93<L>  |  40<H>  ----------------------------<  218<H>  4.0   |  23  |  6.69<H>    Ca    9.2      22 Sep 2020 12:03  Phos  4.5       Mg     2.0           Creatinine Trend: 6.69 <--, 7.43 <--, 5.05 <--, 4.45 <--                        11.3   8.10  )-----------( 124      ( 22 Sep 2020 12:03 )             36.8     Urine Studies:  Urinalysis Basic - ( 20 Sep 2020 14:00 )    Color: YELLOW / Appearance: TURBID / S.017 / pH: 6.5  Gluc: NEGATIVE / Ketone: NEGATIVE  / Bili: NEGATIVE / Urobili: NORMAL   Blood: SMALL / Protein: 300 / Nitrite: NEGATIVE   Leuk Esterase: LARGE / RBC: 0-2 / WBC >50   Sq Epi: MODERATE / Non Sq Epi:  / Bacteria: MANY        
Vascular Surgery Progress Note     S: Chart reviewed  AVF duplex reviewed    Physical Exam:    General: not examined    T(C): 36.8 (09-23-20 @ 09:35), Max: 36.8 (09-23-20 @ 09:35)  HR: 74 (09-23-20 @ 09:35) (74 - 108)  BP: 136/68 (09-23-20 @ 09:35) (101/62 - 137/60)  RR: 18 (09-23-20 @ 09:35) (18 - 18)  SpO2: 100% (09-23-20 @ 09:35) (96% - 100%)    09-22-20 @ 07:01  -  09-23-20 @ 07:00  --------------------------------------------------------  IN: 120 mL / OUT: 200 mL / NET: -80 mL    09-23-20 @ 07:01  -  09-23-20 @ 11:57  --------------------------------------------------------  IN: 240 mL / OUT: 0 mL / NET: 240 mL        LABS:                        11.3   8.10  )-----------( 124      ( 22 Sep 2020 12:03 )             36.8     09-22    136  |  93<L>  |  40<H>  ----------------------------<  218<H>  4.0   |  23  |  6.69<H>    Ca    9.2      22 Sep 2020 12:03  Phos  4.5     09-22  Mg     2.0     09-22      IMAGING:        Patient name: MAGDIEL GUERRIER  Date of test: 9/22/2020  MR#: [id number]  [facility] #: [id number]    Location: [facility] Physician(s): , Amol Joshi MD  Interpreted by: Wei Mon MD, FACCATRACHITA, COCO LING  Tech: Ej Sosa RVT  Type of Test: Upper Extremity Venous  ------------------------------------------------------------------------  Procedure: Real-time grayscale and color Duplex  ultrasonography was used to interrogate hemodialysis  access site on the left upper extremity.  Indications: Localized swelling, mass and lump, left upper  limb (R22.32)  ------------------------------------------------------------------------  RESULT:  ------------------------------------------------------------------------  LEFT:  Deep venous thrombosis: (Not Examined)  Superficial venous thrombosis: (Not Examined)  ------------------------------------------------------------------------  Left Findings: Brachiocephalic arteriovenous fistula noted  in the right upper extremity.  Significant flow velocity increases were noted at the  anastamosis (PSV of 650 cm/sec,  cm/sec).  Hyperechoic material and mild narrowing of the vein is  noted at the site, suggestive of an area of very mild  stenosis/fibrosis and associated partial thrombosis.  Left brachial inflow artery appears patent, with normal  flow velocities and triphasic spectral Doppler waveforms.  The outflow vein appears patent, without evidence of  thrombosis.  The maximal volume flow velocity is 1074 ml/min within the  proximal segment of the fistula.  ------------------------------------------------------------------------  Summary/Impressions:  Brachiocephalic arteriovenous fistula noted in the right  upper extremity.  Significant flow velocity increases were noted at the  anastamosis. Hyperechoic material and  mild narrowing of  the vein is noted at the site, suggestive of an area of  very mild stenosis/fibrosis and associated partial  thrombosis.  Left brachial inflow artery appears patent, with normal  flow velocities and triphasic spectral Doppler waveforms.  The outflow vein appears patent, without evidence of  thrombosis.  The maximal volume flow velocity is 1074 ml/min within the  proximal segment of the fistula.  ------------------------------------------------------------------------  Confirmed on  9/22/2020 - 9:53 AM by Wei Mon MD, [facility],  SUSHIL, RPVI  By signing this report, the attending physician certifies  that he or she has personally supervised and interpreted  the vascular study and has reviewed and or edited and  agrees with the written comments contained within the  report.  
    SUBJECTIVE / OVERNIGHT EVENTS: pt denies chest pain, shortness of breath       MEDICATIONS  (STANDING):  chlorhexidine 4% Liquid 1 Application(s) Topical <User Schedule>  dextrose 5%. 1000 milliLiter(s) (50 mL/Hr) IV Continuous <Continuous>  dextrose 50% Injectable 12.5 Gram(s) IV Push once  dextrose 50% Injectable 25 Gram(s) IV Push once  dextrose 50% Injectable 25 Gram(s) IV Push once  epoetin shay-epbx (RETACRIT) Injectable 6000 Unit(s) IV Push <User Schedule>  heparin   Injectable 5000 Unit(s) SubCutaneous every 8 hours  influenza   Vaccine 0.5 milliLiter(s) IntraMuscular once  insulin lispro (HumaLOG) corrective regimen sliding scale   SubCutaneous three times a day before meals  insulin lispro (HumaLOG) corrective regimen sliding scale   SubCutaneous at bedtime  sevelamer carbonate 800 milliGRAM(s) Oral three times a day with meals    MEDICATIONS  (PRN):  dextrose 40% Gel 15 Gram(s) Oral once PRN Blood Glucose LESS THAN 70 milliGRAM(s)/deciliter  glucagon  Injectable 1 milliGRAM(s) IntraMuscular once PRN Glucose LESS THAN 70 milligrams/deciliter      Vital Signs Last 24 Hrs  T(C): 36.5 (20 Sep 2020 16:50), Max: 36.6 (20 Sep 2020 12:50)  T(F): 97.7 (20 Sep 2020 16:50), Max: 97.9 (20 Sep 2020 12:50)  HR: 84 (20 Sep 2020 16:50) (70 - 84)  BP: 121/43 (20 Sep 2020 16:50) (107/54 - 139/63)  BP(mean): --  RR: 18 (20 Sep 2020 16:50) (17 - 18)  SpO2: 100% (20 Sep 2020 16:50) (99% - 100%)  CAPILLARY BLOOD GLUCOSE      POCT Blood Glucose.: 108 mg/dL (20 Sep 2020 17:18)  POCT Blood Glucose.: 138 mg/dL (20 Sep 2020 12:11)  POCT Blood Glucose.: 145 mg/dL (20 Sep 2020 08:44)  POCT Blood Glucose.: 161 mg/dL (19 Sep 2020 21:23)    I&O's Summary    19 Sep 2020 07:01  -  20 Sep 2020 07:00  --------------------------------------------------------  IN: 236 mL / OUT: 145 mL / NET: 91 mL        Constitutional: No fever, fatigue  Skin: No rash.  Eyes: No recent vision problems or eye pain.  ENT: No congestion, ear pain, or sore throat.  Cardiovascular: No chest pain or palpation.  Respiratory: No cough, shortness of breath, congestion, or wheezing.  Gastrointestinal: No abdominal pain, nausea, vomiting, or diarrhea.  Genitourinary: No dysuria.  Musculoskeletal: No joint swelling.  Neurologic: No headache.    PHYSICAL EXAM:  GENERAL: NAD  EYES: EOMI, PERRLA  NECK: Supple, No JVD  CHEST/LUNG: dec breath sounds at bases   HEART:  S1 , S2 +  ABDOMEN: soft , bs+  EXTREMITIES:  trace edema  NEUROLOGY:alert awake      LABS:                        10.4   7.62  )-----------( 125      ( 20 Sep 2020 06:16 )             34.4     09-20    138  |  95<L>  |  48<H>  ----------------------------<  157<H>  4.1   |  22  |  7.43<H>    Ca    9.1      20 Sep 2020 06:16  Phos  5.4     09-20  Mg     2.1     09-20    TPro  6.7  /  Alb  4.2  /  TBili  0.4  /  DBili  x   /  AST  12  /  ALT  8   /  AlkPhos  90  09-19    PT/INR - ( 19 Sep 2020 00:14 )   PT: 12.3 SEC;   INR: 1.07          PTT - ( 19 Sep 2020 00:14 )  PTT:35.0 SEC  CARDIAC MARKERS ( 19 Sep 2020 03:30 )  x     / x     / 58 u/L / 1.46 ng/mL / x      CARDIAC MARKERS ( 19 Sep 2020 00:14 )  x     / x     / 63 u/L / 1.63 ng/mL / x          Urinalysis Basic - ( 20 Sep 2020 14:00 )    Color: YELLOW / Appearance: TURBID / S.017 / pH: 6.5  Gluc: NEGATIVE / Ketone: NEGATIVE  / Bili: NEGATIVE / Urobili: NORMAL   Blood: SMALL / Protein: 300 / Nitrite: NEGATIVE   Leuk Esterase: LARGE / RBC: 0-2 / WBC >50   Sq Epi: MODERATE / Non Sq Epi: x / Bacteria: MANY        RADIOLOGY & ADDITIONAL TESTS:    Imaging Personally Reviewed:    Consultant(s) Notes Reviewed:      Care Discussed with Consultants/Other Providers:

## 2020-09-24 NOTE — PROGRESS NOTE ADULT - REASON FOR ADMISSION
Syncope

## 2020-09-24 NOTE — PROVIDER CONTACT NOTE (OTHER) - ASSESSMENT
Patient refusing AM labs. Patient swinging her arms and crying telling RN and PCA to stop and she doesn't want morning labs because she's leaving today.

## 2020-09-25 ENCOUNTER — APPOINTMENT (OUTPATIENT)
Dept: PULMONOLOGY | Facility: CLINIC | Age: 80
End: 2020-09-25

## 2020-09-25 RX ORDER — ISOSORBIDE MONONITRATE 60 MG/1
1 TABLET, EXTENDED RELEASE ORAL
Qty: 30 | Refills: 0
Start: 2020-09-25 | End: 2020-10-24

## 2020-09-25 RX ORDER — ATORVASTATIN CALCIUM 80 MG/1
1 TABLET, FILM COATED ORAL
Qty: 0 | Refills: 0 | DISCHARGE

## 2020-09-25 RX ORDER — ATORVASTATIN CALCIUM 80 MG/1
1 TABLET, FILM COATED ORAL
Qty: 30 | Refills: 0
Start: 2020-09-25 | End: 2020-10-24

## 2020-09-25 RX ORDER — FERROUS SULFATE 325(65) MG
1 TABLET ORAL
Qty: 30 | Refills: 0
Start: 2020-09-25 | End: 2020-10-24

## 2020-09-25 RX ORDER — FERROUS SULFATE 325(65) MG
1 TABLET ORAL
Qty: 0 | Refills: 0 | DISCHARGE

## 2020-10-15 ENCOUNTER — APPOINTMENT (OUTPATIENT)
Dept: ELECTROPHYSIOLOGY | Facility: CLINIC | Age: 80
End: 2020-10-15
Payer: MEDICARE

## 2020-10-15 DIAGNOSIS — Z87.39 PERSONAL HISTORY OF OTHER DISEASES OF THE MUSCULOSKELETAL SYSTEM AND CONNECTIVE TISSUE: ICD-10-CM

## 2020-10-15 DIAGNOSIS — Z86.79 PERSONAL HISTORY OF OTHER DISEASES OF THE CIRCULATORY SYSTEM: ICD-10-CM

## 2020-10-15 DIAGNOSIS — Z87.448 PERSONAL HISTORY OF OTHER DISEASES OF URINARY SYSTEM: ICD-10-CM

## 2020-10-15 DIAGNOSIS — I35.0 NONRHEUMATIC AORTIC (VALVE) STENOSIS: ICD-10-CM

## 2020-10-15 DIAGNOSIS — R55 SYNCOPE AND COLLAPSE: ICD-10-CM

## 2020-10-15 DIAGNOSIS — Z86.39 PERSONAL HISTORY OF OTHER ENDOCRINE, NUTRITIONAL AND METABOLIC DISEASE: ICD-10-CM

## 2020-10-15 PROCEDURE — 93285 PRGRMG DEV EVAL SCRMS IP: CPT

## 2020-10-15 RX ORDER — CYPROHEPTADINE HYDROCHLORIDE 4 MG/1
4 TABLET ORAL
Refills: 0 | Status: ACTIVE | COMMUNITY

## 2020-10-15 RX ORDER — METOPROLOL SUCCINATE 25 MG/1
25 TABLET, EXTENDED RELEASE ORAL DAILY
Refills: 0 | Status: ACTIVE | COMMUNITY

## 2020-10-15 RX ORDER — EPOETIN ALFA 4000 [IU]/ML
4000 SOLUTION INTRAVENOUS; SUBCUTANEOUS
Refills: 0 | Status: ACTIVE | COMMUNITY

## 2020-10-15 RX ORDER — METOPROLOL SUCCINATE 100 MG/1
100 TABLET, EXTENDED RELEASE ORAL
Refills: 0 | Status: DISCONTINUED | COMMUNITY
End: 2020-10-15

## 2020-10-15 RX ORDER — ASPIRIN ENTERIC COATED TABLETS 81 MG 81 MG/1
81 TABLET, DELAYED RELEASE ORAL
Refills: 0 | Status: ACTIVE | COMMUNITY

## 2020-10-15 RX ORDER — APIXABAN 2.5 MG/1
2.5 TABLET, FILM COATED ORAL
Refills: 0 | Status: DISCONTINUED | COMMUNITY
End: 2020-10-15

## 2020-10-15 RX ORDER — ERGOCALCIFEROL (VITAMIN D2) 62.5 MCG
CAPSULE ORAL
Refills: 0 | Status: DISCONTINUED | COMMUNITY
End: 2020-10-15

## 2020-10-15 RX ORDER — ATORVASTATIN CALCIUM 20 MG/1
20 TABLET, FILM COATED ORAL
Refills: 0 | Status: ACTIVE | COMMUNITY

## 2020-10-29 ENCOUNTER — APPOINTMENT (OUTPATIENT)
Dept: PULMONOLOGY | Facility: CLINIC | Age: 80
End: 2020-10-29
Payer: MEDICARE

## 2020-10-29 VITALS
TEMPERATURE: 98 F | WEIGHT: 180 LBS | RESPIRATION RATE: 16 BRPM | HEART RATE: 89 BPM | OXYGEN SATURATION: 99 % | SYSTOLIC BLOOD PRESSURE: 140 MMHG | DIASTOLIC BLOOD PRESSURE: 80 MMHG

## 2020-10-29 DIAGNOSIS — Z23 ENCOUNTER FOR IMMUNIZATION: ICD-10-CM

## 2020-10-29 PROCEDURE — 99072 ADDL SUPL MATRL&STAF TM PHE: CPT

## 2020-10-29 PROCEDURE — 99214 OFFICE O/P EST MOD 30 MIN: CPT

## 2020-10-29 NOTE — HISTORY OF PRESENT ILLNESS
[Never] : never [TextBox_4] : She is an 80 year old woman with  a history of hypertension, hyperlipidemia, diabetes, ESRD started on HD in July of 2020 and severe aortic stenosis. She is Jew. \par \par She was admitted to [facility] on 7/23/20 for shortness of breath and renal failure. She was placed on hemodialysis. Echo showed and EF of 55% and severe aortic stenosis.\par \par Presented on 9/3/20 with a cough. No hemoptysis. No fever, chills or sweats. No sick contacts. Was tested for COVID -19 and was found to be negative. No prior history of pulmonary disease. Never smoked. \par \par Now on HD on a regular basis. Has been getting HD M/W/F. .\par \par Feels a little better. Coughing less. \par \par

## 2020-10-29 NOTE — DISCUSSION/SUMMARY
[FreeTextEntry1] : She is an 80 year old woman with  a history of hypertension, hyperlipidemia, diabetes, severe AS and ESRD for which she was started on HD in July of 2020. She is Orthodox. CT Chest 7/29/20: Mild global cardiomegaly. Lung parenchyma was clear. No effusions.PFT 9/3/20: Spirometry was normal. Moderate restriction. Mild response to bronchodilator noted. \par \par She presented on 9/3/20 with a chronic cough. Her cough has improved with the regular hemodialysis. Can use albuterol as needed. \par \par No action needed now. Follow with Cardiology and Nephrology. \par \par Follow up in 6 months.

## 2020-10-29 NOTE — PHYSICAL EXAM
[No Acute Distress] : no acute distress [No Neck Mass] : no neck mass [Normal S1, S2] : normal s1, s2 [No Resp Distress] : no resp distress [Clear to Auscultation Bilaterally] : clear to auscultation bilaterally [No HSM] : no hsm [No Edema] : no edema [Normal Color/ Pigmentation] : normal color/ pigmentation [Oriented x3] : oriented x3 [Normal Gait] : normal gait [TextBox_105] : AV fistula in LUE

## 2020-10-29 NOTE — REVIEW OF SYSTEMS
[Cough] : cough [Anemia] : anemia [Diabetes] : diabetes [Fever] : no fever [Chills] : no chills [Postnasal Drip] : no postnasal drip [Hemoptysis] : no hemoptysis [Chest Tightness] : no chest tightness [Wheezing] : no wheezing [Chest Discomfort] : no chest discomfort [Edema] : no edema [GERD] : no gerd [Arthralgias] : no arthralgias [Rash] : no rash [Seizures] : no seizures [Depression] : no depression

## 2020-11-17 ENCOUNTER — APPOINTMENT (OUTPATIENT)
Dept: ELECTROPHYSIOLOGY | Facility: CLINIC | Age: 80
End: 2020-11-17
Payer: MEDICARE

## 2020-11-17 PROCEDURE — G2066: CPT

## 2020-11-17 PROCEDURE — 93298 REM INTERROG DEV EVAL SCRMS: CPT

## 2020-12-23 ENCOUNTER — APPOINTMENT (OUTPATIENT)
Dept: ELECTROPHYSIOLOGY | Facility: CLINIC | Age: 80
End: 2020-12-23
Payer: MEDICARE

## 2020-12-23 PROCEDURE — 93298 REM INTERROG DEV EVAL SCRMS: CPT

## 2020-12-23 PROCEDURE — G2066: CPT

## 2021-01-23 ENCOUNTER — APPOINTMENT (OUTPATIENT)
Dept: DISASTER EMERGENCY | Facility: CLINIC | Age: 81
End: 2021-01-23

## 2021-01-23 DIAGNOSIS — Z01.818 ENCOUNTER FOR OTHER PREPROCEDURAL EXAMINATION: ICD-10-CM

## 2021-01-25 ENCOUNTER — OUTPATIENT (OUTPATIENT)
Dept: OUTPATIENT SERVICES | Facility: HOSPITAL | Age: 81
LOS: 1 days | End: 2021-01-25
Payer: MEDICARE

## 2021-01-25 ENCOUNTER — TRANSCRIPTION ENCOUNTER (OUTPATIENT)
Age: 81
End: 2021-01-25

## 2021-01-25 VITALS
WEIGHT: 186.95 LBS | SYSTOLIC BLOOD PRESSURE: 147 MMHG | TEMPERATURE: 98 F | RESPIRATION RATE: 16 BRPM | DIASTOLIC BLOOD PRESSURE: 68 MMHG | HEIGHT: 63 IN | OXYGEN SATURATION: 100 % | HEART RATE: 70 BPM

## 2021-01-25 DIAGNOSIS — E78.5 HYPERLIPIDEMIA, UNSPECIFIED: Chronic | ICD-10-CM

## 2021-01-25 DIAGNOSIS — Z90.710 ACQUIRED ABSENCE OF BOTH CERVIX AND UTERUS: Chronic | ICD-10-CM

## 2021-01-25 DIAGNOSIS — N18.6 END STAGE RENAL DISEASE: ICD-10-CM

## 2021-01-25 DIAGNOSIS — Z01.818 ENCOUNTER FOR OTHER PREPROCEDURAL EXAMINATION: ICD-10-CM

## 2021-01-25 DIAGNOSIS — E78.5 HYPERLIPIDEMIA, UNSPECIFIED: ICD-10-CM

## 2021-01-25 DIAGNOSIS — E11.9 TYPE 2 DIABETES MELLITUS WITHOUT COMPLICATIONS: ICD-10-CM

## 2021-01-25 DIAGNOSIS — I77.0 ARTERIOVENOUS FISTULA, ACQUIRED: Chronic | ICD-10-CM

## 2021-01-25 DIAGNOSIS — Z98.890 OTHER SPECIFIED POSTPROCEDURAL STATES: Chronic | ICD-10-CM

## 2021-01-25 DIAGNOSIS — Z78.9 OTHER SPECIFIED HEALTH STATUS: ICD-10-CM

## 2021-01-25 DIAGNOSIS — I10 ESSENTIAL (PRIMARY) HYPERTENSION: ICD-10-CM

## 2021-01-25 DIAGNOSIS — Z29.9 ENCOUNTER FOR PROPHYLACTIC MEASURES, UNSPECIFIED: ICD-10-CM

## 2021-01-25 DIAGNOSIS — R05 COUGH: ICD-10-CM

## 2021-01-25 DIAGNOSIS — R05 COUGH: Chronic | ICD-10-CM

## 2021-01-25 DIAGNOSIS — I35.0 NONRHEUMATIC AORTIC (VALVE) STENOSIS: ICD-10-CM

## 2021-01-25 DIAGNOSIS — Z98.890 OTHER SPECIFIED POSTPROCEDURAL STATES: ICD-10-CM

## 2021-01-25 LAB — SARS-COV-2 N GENE NPH QL NAA+PROBE: NOT DETECTED

## 2021-01-25 PROCEDURE — G0463: CPT

## 2021-01-25 PROCEDURE — 87641 MR-STAPH DNA AMP PROBE: CPT

## 2021-01-25 PROCEDURE — 87640 STAPH A DNA AMP PROBE: CPT

## 2021-01-25 RX ORDER — CYPROHEPTADINE HYDROCHLORIDE 4 MG/1
1 TABLET ORAL
Qty: 0 | Refills: 0 | DISCHARGE

## 2021-01-25 NOTE — H&P PST ADULT - BLOOD AVOIDANCE/RESTRICTIONS, PROFILE
Rose Witness, consent given for refusal blood, HCP in chart, to notify Bloodless /Presybeterian beliefs Sonnyhovah Witness, consent given for refusal blood, HCP in chart, to notify Surgeon and OR booking - both notified/Lutheran beliefs

## 2021-01-25 NOTE — H&P PST ADULT - NSICDXPASTMEDICALHX_GEN_ALL_CORE_FT
PAST MEDICAL HISTORY:  Aortic stenosis pt wants postpone TAVR after fistula , has Cardiology clearance    Bilateral cataracts     Cough hx of pt on inhaler under Pulmonology  care , last time used November 2020    Diabetic retinopathy     DM (diabetes mellitus)     ESRD (end stage renal disease) on dialysis     Fibroids hx of    Gout     H/O syncope 09/19/2020- pt hospitalized , cardiac workup done , HD started loop recorder done    Hypertension     Refusal of blood transfusions as patient is Uatsdin      PAST MEDICAL HISTORY:  Aortic stenosis pt wants postpone TAVR after fistula , has Cardiology clearance    Bilateral cataracts     Cough hx of pt on inhaler under Pulmonology  care , last time used November 2020    Diabetic retinopathy     DM (diabetes mellitus)     ESRD (end stage renal disease) on dialysis     Fibroids hx of    Gout     H/O syncope 09/19/2020- pt hospitalized , cardiac workup done , HD started loop recorder done    HLD (hyperlipidemia)     Hypertension     Refusal of blood transfusions as patient is Nondenominational

## 2021-01-25 NOTE — H&P PST ADULT - NS MD HP INPLANTS MED DEV
left breast loop recorder, right chest permacath, left AV fistula left breast loop recorder [employer] , right chest permacath, left AV fistula

## 2021-01-25 NOTE — H&P PST ADULT - RS GEN PE MLT RESP DETAILS PC
airway patent/breath sounds equal/respirations non-labored/clear to auscultation bilaterally/no rales/no wheezes

## 2021-01-25 NOTE — H&P PST ADULT - NEGATIVE GENERAL GENITOURINARY SYMPTOMS
end stage renal disease - still urinating/no hematuria/no renal colic/no flank pain L/no flank pain R/no incontinence/no dysuria/no urinary hesitancy

## 2021-01-25 NOTE — H&P PST ADULT - NSICDXFAMILYHX_GEN_ALL_CORE_FT
FAMILY HISTORY:  Family history of diabetes mellitus, sister -   Family history of heart disease, mother, father, sisters-   Family history of hypertension, mother, father, sisiter-

## 2021-01-25 NOTE — H&P PST ADULT - NSICDXPASTSURGICALHX_GEN_ALL_CORE_FT
PAST SURGICAL HISTORY:  AV fistula left arm- 9/21/2020    History of hysterectomy 1987    History of loop recorder left chest - 9/22/2020    History of vascular access device right chest permacath- 9/19/2020    HLD (hyperlipidemia)      PAST SURGICAL HISTORY:  AV fistula left arm- 9/21/2020    History of hysterectomy 1987    History of loop recorder left chest - 9/22/2020    History of vascular access device right chest permacath- 9/19/2020

## 2021-01-25 NOTE — H&P PST ADULT - HISTORY OF PRESENT ILLNESS
80 years old female presented to [facility] for evaluation before surgery, patient was diagnosed with end stage renal disease and is scheduled for revision and superficialization of left arm AV fistula on 1/26/2021.

## 2021-01-25 NOTE — H&P PST ADULT - VENOUS THROMBOEMBOLISM CURRENT STATUS
(1) other risk factor (includes escalating BMI, pack-years of smoking, diabetes requiring insulin, chemotherapy, female gender and length of surgery) (1) other risk factor (includes escalating BMI, pack-years of smoking, diabetes requiring insulin, chemotherapy, female gender and length of surgery)/(2) central venous access

## 2021-01-25 NOTE — H&P PST ADULT - NSICDXPROBLEM_GEN_ALL_CORE_FT
PROBLEM DIAGNOSES  Problem: Cough  Assessment and Plan: Use Proair  on day of surgery. Follow-up with PCP postop for management      Problem: History of loop recorder  Assessment and Plan: Hx of Syncope  09/2019, pt had left chest/breast loop recorder inserted , 1 month after inserion no Afib recorded    Problem: Aortic stenosis  Assessment and Plan: Patient has cardiology optimization before surgery, patient has aortic surgery, reccomended TAVR but patient wants first to do fistula surgery , as per Cardiology , patient optimized for sx since benefits ouwighting risk in this case. Cardiac clearance and medical clearance in chart    Problem: Type 2 diabetes mellitus  Assessment and Plan: Continue Levemir 10 units SQ insulin ant night including night before surgery, hold morning insulin ( pt to bring name and dosage of insulin on day of sx to give to nurse to put in medicine list )  Perioperative glucose monitoring and cover as needed. Follow-up with PCP for diabetic management      Problem: Prophylactic measure  Assessment and Plan: Preoperative instructions discussed with pt and given to pt. Instructed pt that no one will be allowed to come to hospital with patient , to notify security on arrival to the lobby of the hospital that today is day of surgery. Patient notified that will need someone to come to the hospital to  after surgery, not to eat or drink anything after midnight the night before the surgery, to avoid NSAIDs such as Ibuprofen, Motrin, Aleve, Advil, naproxen before surgery, to take Tylenol if needed for pain, to report exposure to anyone with any contagious diseases including Covid-19 or if patient is exhibiting any symptoms of COVID-19. Chlorhexidine 4% soap given to pt. Instructed about use of Chlorhexidine 4% soap before surgery. Patient verbalized understanding of instructions given.    Problem: HLD (hyperlipidemia)  Assessment and Plan: Instructed pt to continue Atorvastatin. Follow-up with PCP postop for lipid management      Problem: Hypertension  Assessment and Plan: Continue antihypertensive meds and take with sips of water on day of surgery.  Cleared by PCP. Follow-up with PCP postop for management.      Problem: No blood products  Assessment and Plan: Pt is Scientology, no blood transfusion , consent to avoud/refuse blood trandfusion given to pt to revew and bring on day of sx to sign with Surgeon, , HCP in chart, Surgeon Dr. Villalpando notified    Problem: End stage renal disease  Assessment and Plan: Patient  is scheduled for revision and superficialization of left arm AV fistula on 1/26/2021.

## 2021-01-26 ENCOUNTER — APPOINTMENT (OUTPATIENT)
Dept: ELECTROPHYSIOLOGY | Facility: CLINIC | Age: 81
End: 2021-01-26
Payer: MEDICARE

## 2021-01-26 ENCOUNTER — OUTPATIENT (OUTPATIENT)
Dept: OUTPATIENT SERVICES | Facility: HOSPITAL | Age: 81
LOS: 1 days | End: 2021-01-26
Payer: MEDICARE

## 2021-01-26 VITALS
HEART RATE: 77 BPM | SYSTOLIC BLOOD PRESSURE: 154 MMHG | RESPIRATION RATE: 16 BRPM | TEMPERATURE: 98 F | DIASTOLIC BLOOD PRESSURE: 73 MMHG | OXYGEN SATURATION: 100 %

## 2021-01-26 VITALS
SYSTOLIC BLOOD PRESSURE: 127 MMHG | DIASTOLIC BLOOD PRESSURE: 69 MMHG | TEMPERATURE: 98 F | HEIGHT: 63 IN | RESPIRATION RATE: 16 BRPM | WEIGHT: 186.95 LBS | HEART RATE: 71 BPM | OXYGEN SATURATION: 100 %

## 2021-01-26 DIAGNOSIS — Z98.890 OTHER SPECIFIED POSTPROCEDURAL STATES: Chronic | ICD-10-CM

## 2021-01-26 DIAGNOSIS — N18.6 END STAGE RENAL DISEASE: ICD-10-CM

## 2021-01-26 DIAGNOSIS — Z90.710 ACQUIRED ABSENCE OF BOTH CERVIX AND UTERUS: Chronic | ICD-10-CM

## 2021-01-26 DIAGNOSIS — I77.0 ARTERIOVENOUS FISTULA, ACQUIRED: Chronic | ICD-10-CM

## 2021-01-26 LAB
ANION GAP SERPL CALC-SCNC: 12 MMOL/L — SIGNIFICANT CHANGE UP (ref 5–17)
BUN SERPL-MCNC: 29 MG/DL — HIGH (ref 7–18)
CALCIUM SERPL-MCNC: 8.5 MG/DL — SIGNIFICANT CHANGE UP (ref 8.4–10.5)
CHLORIDE SERPL-SCNC: 101 MMOL/L — SIGNIFICANT CHANGE UP (ref 96–108)
CO2 SERPL-SCNC: 24 MMOL/L — SIGNIFICANT CHANGE UP (ref 22–31)
CREAT SERPL-MCNC: 6.01 MG/DL — HIGH (ref 0.5–1.3)
GLUCOSE BLDC GLUCOMTR-MCNC: 101 MG/DL — HIGH (ref 70–99)
GLUCOSE BLDC GLUCOMTR-MCNC: 143 MG/DL — HIGH (ref 70–99)
GLUCOSE SERPL-MCNC: 152 MG/DL — HIGH (ref 70–99)
MRSA PCR RESULT.: SIGNIFICANT CHANGE UP
POTASSIUM SERPL-MCNC: 4.5 MMOL/L — SIGNIFICANT CHANGE UP (ref 3.5–5.3)
POTASSIUM SERPL-SCNC: 4.5 MMOL/L — SIGNIFICANT CHANGE UP (ref 3.5–5.3)
S AUREUS DNA NOSE QL NAA+PROBE: SIGNIFICANT CHANGE UP
SODIUM SERPL-SCNC: 137 MMOL/L — SIGNIFICANT CHANGE UP (ref 135–145)

## 2021-01-26 PROCEDURE — 93298 REM INTERROG DEV EVAL SCRMS: CPT

## 2021-01-26 PROCEDURE — 36415 COLL VENOUS BLD VENIPUNCTURE: CPT

## 2021-01-26 PROCEDURE — 36832 AV FISTULA REVISION OPEN: CPT

## 2021-01-26 PROCEDURE — 82962 GLUCOSE BLOOD TEST: CPT

## 2021-01-26 PROCEDURE — 15736 MUSCLE-SKIN GRAFT ARM: CPT

## 2021-01-26 PROCEDURE — C1889: CPT

## 2021-01-26 PROCEDURE — 80048 BASIC METABOLIC PNL TOTAL CA: CPT

## 2021-01-26 PROCEDURE — G2066: CPT

## 2021-01-26 RX ORDER — CHLORHEXIDINE GLUCONATE 213 G/1000ML
1 SOLUTION TOPICAL ONCE
Refills: 0 | Status: COMPLETED | OUTPATIENT
Start: 2021-01-26 | End: 2021-01-26

## 2021-01-26 RX ORDER — SODIUM CHLORIDE 9 MG/ML
1000 INJECTION, SOLUTION INTRAVENOUS
Refills: 0 | Status: DISCONTINUED | OUTPATIENT
Start: 2021-01-26 | End: 2021-01-26

## 2021-01-26 RX ORDER — TRAMADOL HYDROCHLORIDE 50 MG/1
50 TABLET ORAL ONCE
Refills: 0 | Status: DISCONTINUED | OUTPATIENT
Start: 2021-01-26 | End: 2021-01-26

## 2021-01-26 RX ORDER — SODIUM CHLORIDE 9 MG/ML
3 INJECTION INTRAMUSCULAR; INTRAVENOUS; SUBCUTANEOUS EVERY 8 HOURS
Refills: 0 | Status: DISCONTINUED | OUTPATIENT
Start: 2021-01-26 | End: 2021-01-26

## 2021-01-26 RX ORDER — TRAMADOL HYDROCHLORIDE 50 MG/1
1 TABLET ORAL
Qty: 6 | Refills: 0
Start: 2021-01-26 | End: 2021-01-28

## 2021-01-26 RX ORDER — HYDROMORPHONE HYDROCHLORIDE 2 MG/ML
0.5 INJECTION INTRAMUSCULAR; INTRAVENOUS; SUBCUTANEOUS
Refills: 0 | Status: DISCONTINUED | OUTPATIENT
Start: 2021-01-26 | End: 2021-01-26

## 2021-01-26 RX ADMIN — CHLORHEXIDINE GLUCONATE 1 APPLICATION(S): 213 SOLUTION TOPICAL at 10:09

## 2021-01-26 RX ADMIN — SODIUM CHLORIDE 3 MILLILITER(S): 9 INJECTION INTRAMUSCULAR; INTRAVENOUS; SUBCUTANEOUS at 10:10

## 2021-01-26 NOTE — ASU PATIENT PROFILE, ADULT - PSH
AV fistula  left arm- 9/21/2020  History of hysterectomy  1987  History of loop recorder  left chest - 9/22/2020  History of vascular access device  right chest permacath- 9/19/2020

## 2021-01-26 NOTE — ASU PATIENT PROFILE, ADULT - BLOOD AVOIDANCE/RESTRICTIONS, PROFILE
Sonnyhovah Witness, consent given for refusal blood, HCP in chart, to notify Surgeon and OR booking - both notified/Islam beliefs

## 2021-01-26 NOTE — ASU PATIENT PROFILE, ADULT - PMH
Aortic stenosis  pt wants postpone TAVR after fistula , has Cardiology clearance  Bilateral cataracts    Cough  hx of pt on inhaler under Pulmonology  care , last time used November 2020  Diabetic retinopathy    DM (diabetes mellitus)    ESRD (end stage renal disease) on dialysis    Fibroids  hx of  Gout    H/O syncope  09/19/2020- pt hospitalized , cardiac workup done , HD started loop recorder done  HLD (hyperlipidemia)    Hypertension    Refusal of blood transfusions as patient is Adventist

## 2021-01-26 NOTE — ASU DISCHARGE PLAN (ADULT/PEDIATRIC) - CALL YOUR DOCTOR IF YOU HAVE ANY OF THE FOLLOWING:
Calling with a question for the nurse  
Patient calling to make sure she can eat a poppyseed muffin in pregnancy. Patient reassured that she will not be exposing her baby to any worrisome amounts of opioids by eating the muffin. She has no further questions.   
Bleeding that does not stop/Swelling that gets worse/Pain not relieved by Medications/Fever greater than (need to indicate Fahrenheit or Celsius)/Wound/Surgical Site with redness, or foul smelling discharge or pus/Numbness, tingling, color or temperature change to extremity/Nausea and vomiting that does not stop/Unable to urinate/Excessive diarrhea/Inability to tolerate liquids or foods/Increased irritability or sluggishness

## 2021-01-26 NOTE — ASU DISCHARGE PLAN (ADULT/PEDIATRIC) - ASU DC SPECIAL INSTRUCTIONSFT
You had reposition of your fistula today . Please leave the dressing and the wrap around arm in place for 48 hours from your operation. Take over the counter Tylenol for pain. For pain not controlled by over the counter Tylenol, take tramadol exactly as prescribed. You had local anesthetic known as a block performed. The left arm will be numb and have no motor function up to 12 hours after procedure. Continue to wear sling until you gain motor and sensation function back to baseline.

## 2021-01-26 NOTE — PACU DISCHARGE NOTE - AIRWAY PATENCY:
Patient do not have an advance care plan and is not interested at this time. Patient verbalized understanding.
Satisfactory

## 2021-01-26 NOTE — ASU DISCHARGE PLAN (ADULT/PEDIATRIC) - CARE PROVIDER_API CALL
Oli Villalpando  SURGERY  [address] [facility], [address]  [place] [address]  Phone: [phone]  Fax: [phone]  Follow Up Time: 2 weeks

## 2021-02-09 PROBLEM — Z53.1 PROCEDURE AND TREATMENT NOT CARRIED OUT BECAUSE OF PATIENT'S DECISION FOR REASONS OF BELIEF AND GROUP PRESSURE: Chronic | Status: ACTIVE | Noted: 2021-01-25

## 2021-02-09 PROBLEM — Z87.898 PERSONAL HISTORY OF OTHER SPECIFIED CONDITIONS: Chronic | Status: ACTIVE | Noted: 2021-01-25

## 2021-02-09 PROBLEM — E78.5 HYPERLIPIDEMIA, UNSPECIFIED: Chronic | Status: ACTIVE | Noted: 2021-01-25

## 2021-02-09 PROBLEM — R05 COUGH: Chronic | Status: ACTIVE | Noted: 2021-01-25

## 2021-02-09 PROBLEM — D21.9 BENIGN NEOPLASM OF CONNECTIVE AND OTHER SOFT TISSUE, UNSPECIFIED: Chronic | Status: ACTIVE | Noted: 2021-01-25

## 2021-03-02 ENCOUNTER — NON-APPOINTMENT (OUTPATIENT)
Age: 81
End: 2021-03-02

## 2021-03-02 ENCOUNTER — APPOINTMENT (OUTPATIENT)
Dept: ELECTROPHYSIOLOGY | Facility: CLINIC | Age: 81
End: 2021-03-02
Payer: MEDICARE

## 2021-03-02 PROCEDURE — G2066: CPT

## 2021-03-02 PROCEDURE — 93298 REM INTERROG DEV EVAL SCRMS: CPT

## 2021-04-07 ENCOUNTER — APPOINTMENT (OUTPATIENT)
Dept: ELECTROPHYSIOLOGY | Facility: CLINIC | Age: 81
End: 2021-04-07
Payer: MEDICARE

## 2021-04-07 ENCOUNTER — NON-APPOINTMENT (OUTPATIENT)
Age: 81
End: 2021-04-07

## 2021-04-07 PROCEDURE — G2066: CPT

## 2021-04-07 PROCEDURE — 93298 REM INTERROG DEV EVAL SCRMS: CPT

## 2021-04-29 ENCOUNTER — APPOINTMENT (OUTPATIENT)
Dept: PULMONOLOGY | Facility: CLINIC | Age: 81
End: 2021-04-29
Payer: MEDICARE

## 2021-04-29 VITALS
RESPIRATION RATE: 16 BRPM | DIASTOLIC BLOOD PRESSURE: 80 MMHG | HEART RATE: 111 BPM | OXYGEN SATURATION: 100 % | TEMPERATURE: 98 F | WEIGHT: 188 LBS | SYSTOLIC BLOOD PRESSURE: 142 MMHG

## 2021-04-29 DIAGNOSIS — R05 COUGH: ICD-10-CM

## 2021-04-29 PROCEDURE — 99072 ADDL SUPL MATRL&STAF TM PHE: CPT

## 2021-04-29 PROCEDURE — 99213 OFFICE O/P EST LOW 20 MIN: CPT

## 2021-04-29 RX ORDER — ETODOLAC 400 MG/1
400 TABLET, FILM COATED ORAL
Refills: 0 | Status: ACTIVE | COMMUNITY

## 2021-04-29 RX ORDER — SEVELAMER CARBONATE 800 MG/1
800 TABLET, FILM COATED ORAL
Refills: 0 | Status: COMPLETED | COMMUNITY
End: 2021-04-29

## 2021-04-29 RX ORDER — SEVELAMER HYDROCHLORIDE 800 MG/1
800 TABLET, FILM COATED ORAL
Refills: 0 | Status: ACTIVE | COMMUNITY

## 2021-04-29 NOTE — PHYSICAL EXAM
[No Acute Distress] : no acute distress [No Neck Mass] : no neck mass [Normal S1, S2] : normal s1, s2 [Clear to Auscultation Bilaterally] : clear to auscultation bilaterally [No HSM] : no hsm [Normal Gait] : normal gait [No Edema] : no edema [Normal Color/ Pigmentation] : normal color/ pigmentation [Oriented x3] : oriented x3 [TextBox_105] : AV fistula in LUE

## 2021-04-29 NOTE — REVIEW OF SYSTEMS
[Cough] : cough [Anemia] : anemia [Diabetes] : diabetes [Fever] : no fever [Postnasal Drip] : no postnasal drip [Hemoptysis] : no hemoptysis [Chest Tightness] : no chest tightness [Sputum] : no sputum [Dyspnea] : no dyspnea [Wheezing] : no wheezing [Chest Discomfort] : no chest discomfort [Edema] : no edema [GERD] : no gerd [Arthralgias] : no arthralgias [Rash] : no rash [Seizures] : no seizures [Depression] : no depression

## 2021-04-29 NOTE — DISCUSSION/SUMMARY
[FreeTextEntry1] : She is an 80 year old woman with  a history of hypertension, hyperlipidemia, diabetes, severe AS and ESRD for which she was started on HD in July of 2020. She is Yars[place]. CT Chest 7/29/20: Mild global cardiomegaly. Lung parenchyma was clear. No effusions. PFT 9/3/20: Spirometry was normal. Moderate restriction. Mild response to bronchodilator noted. \par \par Her cough has improved but not resolved. Physical examination was unremarkable. No active pulmonary pathology evident at this time. \par \par Chest x-ray requested.  \par \par Follow up in three months. Sooner if necessary

## 2021-04-29 NOTE — HISTORY OF PRESENT ILLNESS
[Never] : never [TextBox_4] : She is an 80 year old woman with  a history of hypertension, hyperlipidemia, diabetes, ESRD started on HD in July of 2020 and severe aortic stenosis. She is Hinduism. She was admitted to [facility] on 7/23/20 for shortness of breath and renal failure. She was placed on hemodialysis. Echo showed and EF of 55% and severe aortic stenosis.Presented on 9/3/20 with a cough. No hemoptysis. No fever, chills or sweats. No sick contacts. Was tested for COVID -19 and was found to be negative. No prior history of pulmonary disease. Never smoked. Has been getting HD M/W/F. .\par \par She still has coughing "spells" but less than before. \par \par

## 2021-04-30 ENCOUNTER — INPATIENT (INPATIENT)
Facility: HOSPITAL | Age: 81
LOS: 3 days | Discharge: HOME | End: 2021-05-04
Attending: INTERNAL MEDICINE | Admitting: INTERNAL MEDICINE
Payer: MEDICARE

## 2021-04-30 VITALS
HEART RATE: 90 BPM | SYSTOLIC BLOOD PRESSURE: 131 MMHG | RESPIRATION RATE: 16 BRPM | TEMPERATURE: 98 F | OXYGEN SATURATION: 100 % | DIASTOLIC BLOOD PRESSURE: 57 MMHG | HEIGHT: 63 IN

## 2021-04-30 DIAGNOSIS — I77.0 ARTERIOVENOUS FISTULA, ACQUIRED: Chronic | ICD-10-CM

## 2021-04-30 DIAGNOSIS — Z98.890 OTHER SPECIFIED POSTPROCEDURAL STATES: Chronic | ICD-10-CM

## 2021-04-30 DIAGNOSIS — Z90.710 ACQUIRED ABSENCE OF BOTH CERVIX AND UTERUS: Chronic | ICD-10-CM

## 2021-04-30 LAB
BASOPHILS # BLD AUTO: 0.06 K/UL — SIGNIFICANT CHANGE UP (ref 0–0.2)
BASOPHILS NFR BLD AUTO: 0.4 % — SIGNIFICANT CHANGE UP (ref 0–2)
EOSINOPHIL # BLD AUTO: 0.05 K/UL — SIGNIFICANT CHANGE UP (ref 0–0.5)
EOSINOPHIL NFR BLD AUTO: 0.3 % — SIGNIFICANT CHANGE UP (ref 0–6)
HCT VFR BLD CALC: 27.4 % — LOW (ref 34.5–45)
HGB BLD-MCNC: 9 G/DL — LOW (ref 11.5–15.5)
IANC: 14.21 K/UL — HIGH (ref 1.5–8.5)
IMM GRANULOCYTES NFR BLD AUTO: 1.3 % — SIGNIFICANT CHANGE UP (ref 0–1.5)
LYMPHOCYTES # BLD AUTO: 1.56 K/UL — SIGNIFICANT CHANGE UP (ref 1–3.3)
LYMPHOCYTES # BLD AUTO: 9.1 % — LOW (ref 13–44)
MCHC RBC-ENTMCNC: 32.8 GM/DL — SIGNIFICANT CHANGE UP (ref 32–36)
MCHC RBC-ENTMCNC: 33.1 PG — SIGNIFICANT CHANGE UP (ref 27–34)
MCV RBC AUTO: 100.7 FL — HIGH (ref 80–100)
MONOCYTES # BLD AUTO: 0.96 K/UL — HIGH (ref 0–0.9)
MONOCYTES NFR BLD AUTO: 5.6 % — SIGNIFICANT CHANGE UP (ref 2–14)
NEUTROPHILS # BLD AUTO: 14.21 K/UL — HIGH (ref 1.8–7.4)
NEUTROPHILS NFR BLD AUTO: 83.3 % — HIGH (ref 43–77)
NRBC # BLD: 0 /100 WBCS — SIGNIFICANT CHANGE UP
NRBC # FLD: 0.03 K/UL — HIGH
PLATELET # BLD AUTO: 50 K/UL — LOW (ref 150–400)
RBC # BLD: 2.72 M/UL — LOW (ref 3.8–5.2)
RBC # FLD: 15.1 % — HIGH (ref 10.3–14.5)
WBC # BLD: 17.06 K/UL — HIGH (ref 3.8–10.5)
WBC # FLD AUTO: 17.06 K/UL — HIGH (ref 3.8–10.5)

## 2021-04-30 PROCEDURE — 99285 EMERGENCY DEPT VISIT HI MDM: CPT | Mod: 25

## 2021-04-30 PROCEDURE — 93010 ELECTROCARDIOGRAM REPORT: CPT

## 2021-04-30 NOTE — ED ADULT NURSE NOTE - CHIEF COMPLAINT QUOTE
Pt c/o bleeding from the mouth and weakness from around 7pm Pt is a M,W,F dialysis with left AV fistula. pt got full treatment today. pt was about to eat dinner and when the daughter walked in noticed blood coming from mouth. Pt doesn't remember what happened. Pt noted to have cut on  left side of cheek with bleeding controlled. denies any blood thinner use.  Pt denies any incontinence No complaints of chest pain, headache, nausea, dizziness, vomiting  SOB, fever, chills verbalized.

## 2021-04-30 NOTE — ED PROVIDER NOTE - OBJECTIVE STATEMENT
80F is a M,W,F dialysis with left AV fistula s/p full dialysis today c/o bleeding from the mouth and generalized weakness from around 7pm.  pt was about to eat dinner and when the daughter walked in noticed blood coming from mouth. Pt noted to have cut on  left side of cheek with bleeding controlled. Pt endorses recent oral surgery with bleeding episodes. denies any blood thinner use.  Patient denies melena or brbpr, chest pain, SOB, f/c, cough, abd pain, N/V/D/C, weakness, HA, dizziness, urinary symptoms, extremity pain or swelling or other complaints.

## 2021-04-30 NOTE — ED PROVIDER NOTE - PROGRESS NOTE DETAILS
TRANSFER - IN REPORT:    Verbal report received from Andrew Estrada RN on [place]  being received from PACU for routine post - op      Report consisted of patients Situation, Background, Assessment and   Recommendations(SBAR). Information from the following report(s) SBAR, Intake/Output and MAR was reviewed with the receiving nurse. Opportunity for questions and clarification was provided. Assessment completed upon patients arrival to unit and care assumed. Oriented to room, bed controls, and how to order meals. Family in room. C/o nausea. No c/o pain. Moving feet. Sensation returning to LEs, tingling. Aquacel dry and intact to R knee with iceman. SCDs to LEs. Instructed to call for pain medication when needed.  Yellow gripper socks to feet and instructed not to get up without staff to assist. Deni: patient endorsed to me from sign-out. Admitted to [facility] for WBC of 17 concerning for sepsis. Will give 1 dose antibiotics and admit to follow BCx.

## 2021-04-30 NOTE — ED ADULT TRIAGE NOTE - CHIEF COMPLAINT QUOTE
Pt c/o bleeding from the mouth from around 7pm Pt is a M,W,F dialysis with left AV fistula. pt got full treatment today. pt was about to eat dinner and when the daughter walked in noticed blood coming from mouth. Pt doesn't remember what happened. Pt noted to have cut on  left side of cheek with bleeding controlled. denies any blood thinner use. No complaints of chest pain, headache, nausea, dizziness, vomiting  SOB, fever, chills verbalized. Pt c/o bleeding from the mouth and weakness from around 7pm Pt is a M,W,F dialysis with left AV fistula. pt got full treatment today. pt was about to eat dinner and when the daughter walked in noticed blood coming from mouth. Pt doesn't remember what happened. Pt noted to have cut on  left side of cheek with bleeding controlled. denies any blood thinner use.  Pt denies any incontinence No complaints of chest pain, headache, nausea, dizziness, vomiting  SOB, fever, chills verbalized.

## 2021-04-30 NOTE — ED PROVIDER NOTE - CLINICAL SUMMARY MEDICAL DECISION MAKING FREE TEXT BOX
80F is a M,W,F dialysis with left AV fistula s/p full dialysis today c/o bleeding from the mouth and generalized weakness from around 7pm.  pt was about to eat dinner and when the daughter walked in noticed blood coming from mouth. no melena. bleeding stopped. hemodynamically stable. check lytes, hgb and guaic

## 2021-04-30 NOTE — ED PROVIDER NOTE - PMH
Aortic stenosis  pt wants postpone TAVR after fistula , has Cardiology clearance  Bilateral cataracts    Cough  hx of pt on inhaler under Pulmonology  care , last time used November 2020  Diabetic retinopathy    DM (diabetes mellitus)    ESRD (end stage renal disease) on dialysis    Fibroids  hx of  Gout    H/O syncope  09/19/2020- pt hospitalized , cardiac workup done , HD started loop recorder done  HLD (hyperlipidemia)    Hypertension    Refusal of blood transfusions as patient is Sabianist

## 2021-04-30 NOTE — ED PROVIDER NOTE - SHIFT CHANGE DETAILS
MIKE:  Patient signed out to Dr. Valentine pending labs, cxr, ua.  Leukocytosis to 17, but no obvious source at this time.  HD stable at time of signout.

## 2021-04-30 NOTE — ED PROVIDER NOTE - ATTENDING CONTRIBUTION TO CARE
I have personally performed a face to face bedside history and physical examination of this patient. I have discussed the history, examination, review of systems, assessment and plan of management with the resident. I have reviewed the electronic medical record and amended it to reflect my history, review of systems, physical exam, assessment and plan.    Brief HPI:  80 F is a M,W,F dialysis with left AV fistula s/p full dialysis today c/o bleeding from the mouth and generalized weakness from around 7pm.  Patient states she bit down on side of cheek.  Denies seizure activity, period of loss of consciousness, syncope.  No cp, fever, chills, nausea, vomiting, diarrhea.      Vitals:   Reviewed    Exam:    GEN:  Non-toxic appearing, non-distressed, speaking full sentences, non-diaphoretic, alert   HEENT:  1 cm area of ecchymoses with superficial abrasion to left buccal mucosa, no tongue laceration or ecchymoses, neck supple, EOMI, PERRLA, sclera anicteric, no conjunctival pallor or injection, no stridor, normal voice, no tonsillar exudate, uvula midline, tympanic membranes and external auditory canals normal appearing bilaterally   CV:  regular rhythm and rate, s1/s2 audible, no murmurs, rubs or gallops, peripheral pulses 2+ and symmetric  PULM:  non-labored respirations, lungs clear to auscultation bilaterally, no wheezes, crackles or rales  ABD:  non distended, non-tender, no rebound, no guarding, negative Pinto's sign, bowel sounds normal, no cvat  MSK:  no gross deformity, non-tender extremities and joints, range of motion grossly normal appearing, no extremity edema, extremities warm and well perfused   NEURO:  AAOx3, CN II-XII intact, motor 5/5 in upper and lower extremities bilaterally, sensation grossly intact in extremities and trunk, finger to nose testing wnl, no nystagmus, negative Romberg, no pronator drift  SKIN:  warm, dry, no rash or vesicles     A/P:  80 F is a M,W,F dialysis with left AV fistula s/p full dialysis today c/o bleeding from the mouth and generalized weakness from around 7pm.  VSS AF.  Exam non-toxic appearing.  No focal findings of infection or hypoperfusion on exam.  Patient denies syncope or period of disorientation or unresponsiveness.  EKG non-ischemic.  Unclear etiology of sx.  Possible occult infection such as uti vs. metabolic derangement.  Will send labs, cxr, supportive care.  Dispo pending.

## 2021-04-30 NOTE — ED PROVIDER NOTE - NS ED ROS FT
Constitutional: no fevers, no chills.  Eyes: no visual changes.  Ears: no ear drainage, no ear pain.  Nose: no nasal congestion.  Mouth/Throat: no sore throat. +bloody mouth  Cardiovascular: no chest pain.  Respiratory: no shortness of breath, no wheezing, no cough  Gastrointestinal: no nausea, no vomiting, no diarrhea, no abdominal pain.  MSK: no flank pain, no back pain.  Genitourinary: no dysuria, no hematuria.  Skin: no rashes.  Neuro: no headache

## 2021-04-30 NOTE — ED ADULT NURSE NOTE - OBJECTIVE STATEMENT
Received PT to room TR- AAO4, ambulatory with walker at baseline, PMhx DM, HTN, Aortic Stenosis, ESRD (Left arm AVF - M,W,F dialysis with s/p full dialysis today. C/O generalized weakness and bleeding from mouth - laceration noted to inner left cheek. PT states bit inner cheek earlier, not actively bleeding, denies blood thinner use. Rectal temp done as per Dr. Najera. PT denies LOC, denies chest pain, SOB, headache, dizziness, dysuria. PT continent, skin intact.  Right wrist 22g placed, labs drawn, bed in lowest position, call bell within reach. Pending guia. MD Duran evaluating PT at bedside.

## 2021-04-30 NOTE — ED PROVIDER NOTE - PHYSICAL EXAMINATION
Physical Examination: Gen: NAD, non-toxic, conversational  Eyes: PERRLA, EOMI   HENT: Normocephalic, atraumatic. dried blood oral cavity. External ears normal, no rhinorrhea, moist mucous membranes.   CV: RRR, no M/R/G  Resp: CTAB, non-labored, speaking without difficulty on room air  Abd: soft, non-tender, non rigid, no guarding or rebound tenderness  Back: No CVAT bilaterally, no midline ttp  Skin: dry, wwp   Neuro: AOx3, speech is fluent and appropriate, HARRIS without laterality, stable gait   Psych: Mood okay, affect euthymic

## 2021-05-01 DIAGNOSIS — E78.5 HYPERLIPIDEMIA, UNSPECIFIED: ICD-10-CM

## 2021-05-01 DIAGNOSIS — D69.6 THROMBOCYTOPENIA, UNSPECIFIED: ICD-10-CM

## 2021-05-01 DIAGNOSIS — I10 ESSENTIAL (PRIMARY) HYPERTENSION: ICD-10-CM

## 2021-05-01 DIAGNOSIS — Z79.899 OTHER LONG TERM (CURRENT) DRUG THERAPY: ICD-10-CM

## 2021-05-01 DIAGNOSIS — Z29.9 ENCOUNTER FOR PROPHYLACTIC MEASURES, UNSPECIFIED: ICD-10-CM

## 2021-05-01 DIAGNOSIS — E11.65 TYPE 2 DIABETES MELLITUS WITH HYPERGLYCEMIA: ICD-10-CM

## 2021-05-01 DIAGNOSIS — N18.6 END STAGE RENAL DISEASE: ICD-10-CM

## 2021-05-01 DIAGNOSIS — I35.0 NONRHEUMATIC AORTIC (VALVE) STENOSIS: ICD-10-CM

## 2021-05-01 DIAGNOSIS — D72.829 ELEVATED WHITE BLOOD CELL COUNT, UNSPECIFIED: ICD-10-CM

## 2021-05-01 DIAGNOSIS — K13.79 OTHER LESIONS OF ORAL MUCOSA: ICD-10-CM

## 2021-05-01 LAB
A1C WITH ESTIMATED AVERAGE GLUCOSE RESULT: 7.2 % — HIGH (ref 4–5.6)
ALBUMIN SERPL ELPH-MCNC: 3.8 G/DL — SIGNIFICANT CHANGE UP (ref 3.3–5)
ALP SERPL-CCNC: 79 U/L — SIGNIFICANT CHANGE UP (ref 40–120)
ALT FLD-CCNC: 9 U/L — SIGNIFICANT CHANGE UP (ref 4–33)
ANION GAP SERPL CALC-SCNC: 12 MMOL/L — SIGNIFICANT CHANGE UP (ref 7–14)
ANION GAP SERPL CALC-SCNC: 17 MMOL/L — HIGH (ref 7–14)
APPEARANCE UR: ABNORMAL
AST SERPL-CCNC: 16 U/L — SIGNIFICANT CHANGE UP (ref 4–32)
BILIRUB SERPL-MCNC: 0.4 MG/DL — SIGNIFICANT CHANGE UP (ref 0.2–1.2)
BILIRUB UR-MCNC: NEGATIVE — SIGNIFICANT CHANGE UP
BUN SERPL-MCNC: 20 MG/DL — SIGNIFICANT CHANGE UP (ref 7–23)
BUN SERPL-MCNC: 26 MG/DL — HIGH (ref 7–23)
CALCIUM SERPL-MCNC: 8.7 MG/DL — SIGNIFICANT CHANGE UP (ref 8.4–10.5)
CALCIUM SERPL-MCNC: 8.9 MG/DL — SIGNIFICANT CHANGE UP (ref 8.4–10.5)
CHLORIDE SERPL-SCNC: 93 MMOL/L — LOW (ref 98–107)
CHLORIDE SERPL-SCNC: 94 MMOL/L — LOW (ref 98–107)
CO2 SERPL-SCNC: 27 MMOL/L — SIGNIFICANT CHANGE UP (ref 22–31)
CO2 SERPL-SCNC: 32 MMOL/L — HIGH (ref 22–31)
COLOR SPEC: YELLOW — SIGNIFICANT CHANGE UP
CREAT SERPL-MCNC: 5.03 MG/DL — HIGH (ref 0.5–1.3)
CREAT SERPL-MCNC: 6.29 MG/DL — HIGH (ref 0.5–1.3)
DIFF PNL FLD: NEGATIVE — SIGNIFICANT CHANGE UP
ESTIMATED AVERAGE GLUCOSE: 160 MG/DL — HIGH (ref 68–114)
GLUCOSE BLDC GLUCOMTR-MCNC: 133 MG/DL — HIGH (ref 70–99)
GLUCOSE BLDC GLUCOMTR-MCNC: 140 MG/DL — HIGH (ref 70–99)
GLUCOSE BLDC GLUCOMTR-MCNC: 142 MG/DL — HIGH (ref 70–99)
GLUCOSE BLDC GLUCOMTR-MCNC: 252 MG/DL — HIGH (ref 70–99)
GLUCOSE SERPL-MCNC: 177 MG/DL — HIGH (ref 70–99)
GLUCOSE SERPL-MCNC: 206 MG/DL — HIGH (ref 70–99)
GLUCOSE UR QL: NEGATIVE — SIGNIFICANT CHANGE UP
HCT VFR BLD CALC: 26.3 % — LOW (ref 34.5–45)
HEPARIN-PF4 AB RESULT: <0.6 U/ML — SIGNIFICANT CHANGE UP (ref 0–0.9)
HGB BLD-MCNC: 8.1 G/DL — LOW (ref 11.5–15.5)
KETONES UR-MCNC: NEGATIVE — SIGNIFICANT CHANGE UP
LEUKOCYTE ESTERASE UR-ACNC: NEGATIVE — SIGNIFICANT CHANGE UP
MAGNESIUM SERPL-MCNC: 1.9 MG/DL — SIGNIFICANT CHANGE UP (ref 1.6–2.6)
MAGNESIUM SERPL-MCNC: 2.1 MG/DL — SIGNIFICANT CHANGE UP (ref 1.6–2.6)
MCHC RBC-ENTMCNC: 30.8 GM/DL — LOW (ref 32–36)
MCHC RBC-ENTMCNC: 32 PG — SIGNIFICANT CHANGE UP (ref 27–34)
MCV RBC AUTO: 104 FL — HIGH (ref 80–100)
NITRITE UR-MCNC: NEGATIVE — SIGNIFICANT CHANGE UP
NRBC # BLD: 0 /100 WBCS — SIGNIFICANT CHANGE UP
NRBC # FLD: 0.03 K/UL — HIGH
PF4 HEPARIN CMPLX AB SER-ACNC: NEGATIVE — SIGNIFICANT CHANGE UP
PH UR: 7.5 — SIGNIFICANT CHANGE UP (ref 5–8)
PHOSPHATE SERPL-MCNC: 3.6 MG/DL — SIGNIFICANT CHANGE UP (ref 2.5–4.5)
PHOSPHATE SERPL-MCNC: 4.4 MG/DL — SIGNIFICANT CHANGE UP (ref 2.5–4.5)
PLATELET # BLD AUTO: 60 K/UL — LOW (ref 150–400)
POTASSIUM SERPL-MCNC: 4.2 MMOL/L — SIGNIFICANT CHANGE UP (ref 3.5–5.3)
POTASSIUM SERPL-MCNC: 4.3 MMOL/L — SIGNIFICANT CHANGE UP (ref 3.5–5.3)
POTASSIUM SERPL-SCNC: 4.2 MMOL/L — SIGNIFICANT CHANGE UP (ref 3.5–5.3)
POTASSIUM SERPL-SCNC: 4.3 MMOL/L — SIGNIFICANT CHANGE UP (ref 3.5–5.3)
PROT SERPL-MCNC: 7 G/DL — SIGNIFICANT CHANGE UP (ref 6–8.3)
PROT UR-MCNC: ABNORMAL
RBC # BLD: 2.53 M/UL — LOW (ref 3.8–5.2)
RBC # FLD: 15.2 % — HIGH (ref 10.3–14.5)
SARS-COV-2 RNA SPEC QL NAA+PROBE: SIGNIFICANT CHANGE UP
SODIUM SERPL-SCNC: 137 MMOL/L — SIGNIFICANT CHANGE UP (ref 135–145)
SODIUM SERPL-SCNC: 138 MMOL/L — SIGNIFICANT CHANGE UP (ref 135–145)
SP GR SPEC: 1.02 — SIGNIFICANT CHANGE UP (ref 1.01–1.02)
TROPONIN T, HIGH SENSITIVITY RESULT: 64 NG/L — CRITICAL HIGH
UROBILINOGEN FLD QL: SIGNIFICANT CHANGE UP
WBC # BLD: 11.61 K/UL — HIGH (ref 3.8–10.5)
WBC # FLD AUTO: 11.61 K/UL — HIGH (ref 3.8–10.5)

## 2021-05-01 PROCEDURE — 71046 X-RAY EXAM CHEST 2 VIEWS: CPT | Mod: 26

## 2021-05-01 PROCEDURE — 99223 1ST HOSP IP/OBS HIGH 75: CPT

## 2021-05-01 RX ORDER — METOPROLOL TARTRATE 50 MG
25 TABLET ORAL DAILY
Refills: 0 | Status: DISCONTINUED | OUTPATIENT
Start: 2021-05-01 | End: 2021-05-04

## 2021-05-01 RX ORDER — CEFEPIME 1 G/1
1000 INJECTION, POWDER, FOR SOLUTION INTRAMUSCULAR; INTRAVENOUS ONCE
Refills: 0 | Status: COMPLETED | OUTPATIENT
Start: 2021-05-01 | End: 2021-05-01

## 2021-05-01 RX ORDER — INSULIN LISPRO 100/ML
VIAL (ML) SUBCUTANEOUS AT BEDTIME
Refills: 0 | Status: DISCONTINUED | OUTPATIENT
Start: 2021-05-01 | End: 2021-05-04

## 2021-05-01 RX ORDER — DEXTROSE 50 % IN WATER 50 %
15 SYRINGE (ML) INTRAVENOUS ONCE
Refills: 0 | Status: DISCONTINUED | OUTPATIENT
Start: 2021-05-01 | End: 2021-05-04

## 2021-05-01 RX ORDER — INSULIN DETEMIR 100/ML (3)
10 INSULIN PEN (ML) SUBCUTANEOUS
Qty: 0 | Refills: 0 | DISCHARGE

## 2021-05-01 RX ORDER — DEXTROSE 50 % IN WATER 50 %
25 SYRINGE (ML) INTRAVENOUS ONCE
Refills: 0 | Status: DISCONTINUED | OUTPATIENT
Start: 2021-05-01 | End: 2021-05-04

## 2021-05-01 RX ORDER — INSULIN LISPRO 100/ML
VIAL (ML) SUBCUTANEOUS
Refills: 0 | Status: DISCONTINUED | OUTPATIENT
Start: 2021-05-01 | End: 2021-05-04

## 2021-05-01 RX ORDER — INSULIN LISPRO 100/ML
10 VIAL (ML) SUBCUTANEOUS
Refills: 0 | Status: DISCONTINUED | OUTPATIENT
Start: 2021-05-01 | End: 2021-05-01

## 2021-05-01 RX ORDER — DEXTROSE 50 % IN WATER 50 %
12.5 SYRINGE (ML) INTRAVENOUS ONCE
Refills: 0 | Status: DISCONTINUED | OUTPATIENT
Start: 2021-05-01 | End: 2021-05-04

## 2021-05-01 RX ORDER — SODIUM CHLORIDE 9 MG/ML
1000 INJECTION, SOLUTION INTRAVENOUS
Refills: 0 | Status: DISCONTINUED | OUTPATIENT
Start: 2021-05-01 | End: 2021-05-04

## 2021-05-01 RX ORDER — ATORVASTATIN CALCIUM 80 MG/1
20 TABLET, FILM COATED ORAL AT BEDTIME
Refills: 0 | Status: DISCONTINUED | OUTPATIENT
Start: 2021-05-01 | End: 2021-05-04

## 2021-05-01 RX ORDER — SEVELAMER CARBONATE 2400 MG/1
800 POWDER, FOR SUSPENSION ORAL
Refills: 0 | Status: DISCONTINUED | OUTPATIENT
Start: 2021-05-01 | End: 2021-05-04

## 2021-05-01 RX ORDER — ALBUTEROL 90 UG/1
2 AEROSOL, METERED ORAL EVERY 6 HOURS
Refills: 0 | Status: DISCONTINUED | OUTPATIENT
Start: 2021-05-01 | End: 2021-05-04

## 2021-05-01 RX ORDER — GLUCAGON INJECTION, SOLUTION 0.5 MG/.1ML
1 INJECTION, SOLUTION SUBCUTANEOUS ONCE
Refills: 0 | Status: DISCONTINUED | OUTPATIENT
Start: 2021-05-01 | End: 2021-05-04

## 2021-05-01 RX ORDER — ACETAMINOPHEN 500 MG
650 TABLET ORAL EVERY 6 HOURS
Refills: 0 | Status: DISCONTINUED | OUTPATIENT
Start: 2021-05-01 | End: 2021-05-04

## 2021-05-01 RX ADMIN — SEVELAMER CARBONATE 800 MILLIGRAM(S): 2400 POWDER, FOR SUSPENSION ORAL at 12:33

## 2021-05-01 RX ADMIN — CEFEPIME 100 MILLIGRAM(S): 1 INJECTION, POWDER, FOR SOLUTION INTRAMUSCULAR; INTRAVENOUS at 03:06

## 2021-05-01 RX ADMIN — Medication 1: at 22:20

## 2021-05-01 RX ADMIN — SEVELAMER CARBONATE 800 MILLIGRAM(S): 2400 POWDER, FOR SUSPENSION ORAL at 17:48

## 2021-05-01 RX ADMIN — SEVELAMER CARBONATE 800 MILLIGRAM(S): 2400 POWDER, FOR SUSPENSION ORAL at 09:12

## 2021-05-01 RX ADMIN — ATORVASTATIN CALCIUM 20 MILLIGRAM(S): 80 TABLET, FILM COATED ORAL at 21:01

## 2021-05-01 RX ADMIN — Medication 25 MILLIGRAM(S): at 06:34

## 2021-05-01 NOTE — H&P ADULT - NSHPREVIEWOFSYSTEMS_GEN_ALL_CORE
ROS:    Constitutional: [ ] fevers [ ] chills   HEENT: [ ] dry eyes [ ] eye irritation [ ] postnasal drip [ ] nasal congestion [x] oral bleeding  CV: [ ] chest pain [ ] orthopnea [ ] palpitations [ ] murmur  Resp: [ ] cough [ ] shortness of breath [ ] dyspnea   GI: [ ] nausea [ ] vomiting [ ] diarrhea [ ] constipation [ ] abd pain [ ] dysphagia   : [ ] dysuria [ ] nocturia [ ] hematuria [ ] increased urinary frequency  Musculoskeletal: [ ] back pain [ ] myalgias [ ] arthralgias [ ] fracture  Skin: [ ] rash [ ] itch  Neurological: [ ] headache [ ] dizziness [ ] syncope [ ] weakness [ ] numbness  Psychiatric: [ ] anxiety [ ] depression  Endocrine: [ ] diabetes [ ] thyroid problem  Hematologic/Lymphatic: [ ] anemia [x ] bleeding problem  Allergic/Immunologic: [ ] itchy eyes [ ] nasal discharge [ ] hives [ ] angioedema  [x ] All other systems negative

## 2021-05-01 NOTE — H&P ADULT - PROBLEM SELECTOR PLAN 1
Left inner cheek bleeding now resolved most likely due to cheek biting. Patient has had multiple instances of tongue/cheek biting in the last few weeks and takes ASA. Had prior teeth extractions and awaiting dentures  -would speak to dentistry outpatient regarding patient's dentures and if further extractions are planned  -hold ASA  -continue to monitor for signs of bleeding  -denies hemoptysis, hematemesis  -monitor thrombocytopenia

## 2021-05-01 NOTE — ED ADULT NURSE REASSESSMENT NOTE - NS ED NURSE REASSESS COMMENT FT1
Pt in bed, NAD. Denies chest pain, SOB, headache, dizziness. PT refusing straight cath. MD Cheema and Serge made aware.

## 2021-05-01 NOTE — H&P ADULT - NSICDXPASTSURGICALHX_GEN_ALL_CORE_FT
PAST SURGICAL HISTORY:  AV fistula left arm- 9/21/2020    History of hysterectomy 1987    History of loop recorder left chest - 9/22/2020    History of vascular access device right chest permacath- 9/19/2020

## 2021-05-01 NOTE — CONSULT NOTE ADULT - TIME BILLING
- Review of records, telemetry, vital signs and daily labs.   - General and cardiovascular physical examination.  - Generation of cardiovascular treatment plan.  - Coordination of care.    Patient was seen and examined by me on 05/01/2021,interim events noted,labs and radiology studies reviewed.  Amol Joshi MD,FACC.  [address] [place], [place] [id number].  [phone]

## 2021-05-01 NOTE — PHYSICAL THERAPY INITIAL EVALUATION ADULT - ADDITIONAL COMMENTS
Pt lives with her daughter in apartment, there is 1 step to enter and no steps to get into apartment (elevator access). Pt was using rolling walker prior to admission. Pt was independent in all ADL prior to admission.

## 2021-05-01 NOTE — CONSULT NOTE ADULT - ASSESSMENT
80F with PMHx Christian (will not take blood products), ESRD (MWF, L AVF), DM on insulin (only premeal novolog), HTN, moderate AS, ILR 9/2020 presenting with oral bleeding today now self-resolved. Found to have leukocytosis    Problem/Plan - 1:  ·  Problem: Oral bleeding.  Plan: Left inner cheek bleeding now resolved most likely due to cheek biting. Patient has had multiple instances of tongue/cheek biting in the last few weeks and takes ASA. Had prior teeth extractions and awaiting dentures  -would speak to dentistry outpatient regarding patient's dentures and if further extractions are planned  -hold ASA  -continue to monitor for signs of bleeding  -denies hemoptysis, hematemesis  -monitor thrombocytopenia.     Problem/Plan - 2:  ·  Problem: Leukocytosis, unspecified type.  Plan: Leukocytosis to 17. Unclear etiology at this time. Afebrile, denies respiratory, abdominal, urinary complaints. Does not appear to be dental infection either as denies purulent drainage from mouth and no tooth pain/swelling. Only area of swelling is area of likely prior cut/bite in left cheek  -f/u BCx x2  -f/u UA  -CXR clear  -trend WBC, fever curve  -s/p cefepime in ED  -would monitor off abx for now as patient appears well and denying complaints.     Problem/Plan - 3:  ·  Problem: Thrombocytopenia.  Plan: Plt 50 and prior has been 120s. May be multifactorial - ASA use, heparin with HD?, Aortic stenosis, ESRD.   -hold ASA  -monitor plt and if continues to be lower than baseline and recurrent bleeding would have hematology evaluation.     Problem/Plan - 4:  ·  Problem: ESRD (end stage renal disease) on dialysis.  Plan: Nephrology consult in AM  Sevelamer.     Problem/Plan - 5:  ·  Problem: Type 2 diabetes mellitus with hyperglycemia, with long-term current use of insulin.  Plan: States she takes novolog 20u premeal at home. Last A1C 5.4 in September and at that time was discharged off insulin. Will only give ISS at this time as patient notes hypoglycemic events with her insulin. Likely should discontinue insulin on discharge or decrease dosing.     Problem/Plan - 6:  Problem: Aortic valve stenosis, . Plan: Resume home BB. Med rec for other medications.    Problem/Plan - 7:  ·  Problem: Hypertension, unspecified type.  Plan: Resume BB. Med rec.     Problem/Plan - 8:  ·  Problem: Hyperlipidemia, unspecified hyperlipidemia type.  Plan: Resume statin.     Problem/Plan - 9:  ·  Problem: Medication management.  Plan: Patient does not know all of her medications and unable to reach daughter for full med rec. Please perform med rec in AM.     Problem/Plan - 10:  Problem: Need for prophylactic measure. Plan; SCDs, hold chemical DVT ppx given plt 50 and oral bleeding  Renal/consistent carb/DASH/TLC diet  PT eval.

## 2021-05-01 NOTE — H&P ADULT - ASSESSMENT
80F with PMHx Hindu (will not take blood products), ESRD (MWF, L AVF), DM on insulin (only premeal novolog), HTN, moderate AS, ILR 9/2020 presenting with oral bleeding today now self-resolved. Found to have leukocytosis

## 2021-05-01 NOTE — CONSULT NOTE ADULT - ASSESSMENT
80F with PMHx Hinduism (will not take blood products), ESRD (MWF, L AVF), DM on insulin (only premeal novolog), HTN, moderate AS, ILR 9/2020 presenting with oral bleeding today now self-resolved. Found to have leukocytosisRenal consulted for ESRD and dialysis management     ESRD on dialysis.   out pt schedule MWF  s/p last HD yesterday  electrolytes and volume status acceptable.   Consent for dialysis obtained from pt and in chart.  renal diet   dose all meds dose ESRD  plan for next routine HD Monday w/2k bath, uf as tolerated by bp  HTN, controlled- c/w BB with hold parameters.   Anemia in CKD, Hb < goal. will add AAKASH w/next HD      labs, rad, chart reviewed  Thanks for consulting  d/w pt  [facility]  Office [phone]  Ans Serv [phone]  [facility] [phone] 80F with PMHx Cheondoism, ESRD (MWF, L AVF), DM on insulin, HTN, moderate AS, ILR 9/2020 presenting with oral bleeding today now self-resolved. Found to have leukocytosis. Renal consulted for ESRD and dialysis management     ESRD on dialysis.   out pt schedule MWF  s/p last HD outpt yesterday  electrolytes and volume status acceptable.   Consent for dialysis obtained from pt and in chart.  no indication for HD today  renal diet   dose all meds dose ESRD  plan for next routine HD Monday w/2k bath, uf as tolerated by bp  HTN, controlled- c/w BB with hold parameters.   Anemia in CKD, Hb < goal. will add AAKASH w/next HD    labs, rad, chart reviewed  Thanks for consulting  poc d/w pt  [facility]  Office [phone]  Ans Serv [phone]  Cell - [phone]

## 2021-05-01 NOTE — H&P ADULT - HISTORY OF PRESENT ILLNESS
80F with PMHx Yazidism (will not take blood products), ESRD (MWF, L AVF), DM on insulin (only premeal novolog), HTN, moderate AS, ILR 9/2020 presenting with oral bleeding today now self-resolved. Pt had session of HD today and noted she had a clotting episode? there but was able to complete. When she was home she was about to eat dinner but then noticed mouth bleeding. Her daughter witnessed this and called EMS. It was noted by EMS that she had bleeding from the inside of the left cheek that self resolved since. Per patient she has had on and off bleeding within the mouth from biting on cheek and tongue. She had a recent oral surgery in March with teeth extractions (due to "teeth bone being weaker"). She takes ASA daily as well. The daughter was unable to be reached for collateral information or med rec confirmation. Patient denies any hemoptysis, hematemesis, hematochezia or melena, LH/dizziness, LOC, CP, SOB, cough, fevers, chills, diarrhea, oral pain.    In ED, leukocytosis to 17 and given cefepime

## 2021-05-01 NOTE — H&P ADULT - PROBLEM SELECTOR PLAN 9
Patient does not know all of her medications and unable to reach daughter for full med rec. Please perform med rec in AM

## 2021-05-01 NOTE — H&P ADULT - NSHPLABSRESULTS_GEN_ALL_CORE
I have personally reviewed this patient's labs below:                        9.0    17.06 )-----------( 50       ( 30 Apr 2021 23:16 )             27.4     04-30-21 @ 23:16    138  |  94<L>  |  20             --------------------------< 206<H>     4.2  |  27  | 5.03<H>    eGFR AA: 9<L>  eGFR N-AA: 8<L>    Calcium: 8.9  Phosphorus: 3.6  Magnesium: 1.9    AST: 16    ALT: 9  AlkPhos: 79  Protein: 7.0  Albumin: 3.8  TBili: 0.4  D-Bili: --    Troponin 67    I have personally reviewed this patient's EKG and my independent interpretation is NSR, no ST depressions or elevations    I have personally reviewed this patient's CXR and my independent interpretation is clear lungs

## 2021-05-01 NOTE — H&P ADULT - NSICDXPASTMEDICALHX_GEN_ALL_CORE_FT
DISPLAY PLAN FREE TEXT
PAST MEDICAL HISTORY:  Aortic stenosis pt wants postpone TAVR after fistula , has Cardiology clearance    Bilateral cataracts     Cough hx of pt on inhaler under Pulmonology  care , last time used November 2020    Diabetic retinopathy     DM (diabetes mellitus)     ESRD (end stage renal disease) on dialysis     Fibroids hx of    Gout     H/O syncope 09/19/2020- pt hospitalized , cardiac workup done , HD started loop recorder done    HLD (hyperlipidemia)     Hypertension     Refusal of blood transfusions as patient is Anglican

## 2021-05-01 NOTE — H&P ADULT - PROBLEM SELECTOR PLAN 2
Leukocytosis to 17. Unclear etiology at this time. Afebrile, denies respiratory, abdominal, urinary complaints. Does not appear to be dental infection either as denies purulent drainage from mouth and no tooth pain/swelling. Only area of swelling is area of likely prior cut/bite in left cheek  -f/u BCx x2  -f/u UA  -CXR clear  -trend WBC, fever curve  -s/p cefepime in ED  -would monitor off abx for now as patient appears well and denying complaints

## 2021-05-01 NOTE — H&P ADULT - NSHPPHYSICALEXAM_GEN_ALL_CORE
PHYSICAL EXAM:  Vital Signs Last 24 Hrs  T(C): 36.8 (05-01-21 @ 04:04)  T(F): 98.2 (05-01-21 @ 04:04), Max: 98.9 (04-30-21 @ 23:12)  HR: 82 (05-01-21 @ 04:04) (75 - 90)  BP: 109/57 (05-01-21 @ 04:04)  BP(mean): --  RR: 18 (05-01-21 @ 04:04) (16 - 22)  SpO2: 100% (05-01-21 @ 04:04) (100% - 100%)  Wt(kg): --    Constitutional: NAD, awake and alert, well developed  EYES: EOMI, conjunctiva clear  ENT:  Normal Hearing, no tonsillar exudates, poor dentition, left cheek swelling, redness on inside without bleeding - appears to be prior break in mucosa   Neck: Soft and supple , no thyromegaly   Respiratory: Breath sounds are clear bilaterally, No wheezing, rales or rhonchi, no tachypnea, no accessory muscle use  Cardiovascular: S1 and S2, regular rate and rhythm, systolic Murmurs, gallops or rubs, no JVD, no leg edema  Gastrointestinal: Bowel Sounds present, soft, nontender, nondistended, no guarding, no rebound  Extremities: No cyanosis or clubbing; warm to touch  Vascular: 2+ peripheral pulses lower ex  Neurological: No focal deficits, CN II-XII intact bilaterally, sensation to light touch intact in all extremities. Pupils are equally reactive to light and symmetrical in size.   Musculoskeletal: 5/5 strength b/l upper and lower extremities; no joint swelling.  Skin: No rashes, no ulcerations, L AVF with thrill

## 2021-05-01 NOTE — H&P ADULT - PROBLEM SELECTOR PLAN 3
Plt 50 and prior has been 120s. May be multifactorial - ASA use, heparin with HD?, Aortic stenosis, ESRD.   -hold ASA  -monitor plt and if continues to be lower than baseline and recurrent bleeding would have hematology evaluation

## 2021-05-01 NOTE — H&P ADULT - PROBLEM SELECTOR PLAN 10
SCDs, hold chemical DVT ppx given plt 50 and oral bleeding  Renal/consistent carb/DASH/TLC diet  PT eval

## 2021-05-01 NOTE — CONSULT NOTE ADULT - SUBJECTIVE AND OBJECTIVE BOX
[facility] - S Suzi / Hieu S /D Daniel/ S Linh/ S Piedad/ Aydin Greene / AKIKO Baldwinu/ O Yumiko  service -[phone], office [phone]  ---------------------------------------------------------------------------------------------------------------    Patient is a 80y Female whom presented to the hospital with   Denied recent NSAID use/Abx use/iv contrast studies.    Patient seen and examined    PAST MEDICAL & SURGICAL HISTORY:  ESRD (end stage renal disease) on dialysis    DM (diabetes mellitus)    Aortic stenosis  pt wants postpone TAVR after fistula , has Cardiology clearance    Hypertension    Gout    Diabetic retinopathy    Bilateral cataracts    Cough  hx of pt on inhaler under Pulmonology  care , last time used 2020    H/O syncope  2020- pt hospitalized , cardiac workup done , HD started loop recorder done    Refusal of blood transfusions as patient is Jehovah&#x27;s Witness    Fibroids  hx of    HLD (hyperlipidemia)    AV fistula  left arm- 2020    History of hysterectomy  1987    History of vascular access device  right chest permacath- 2020    History of loop recorder  left chest - 2020        Allergies: No Known Allergies    Home Medications Reviewed  Hospital Medications:   MEDICATIONS  (STANDING):  atorvastatin 20 milliGRAM(s) Oral at bedtime  dextrose 40% Gel 15 Gram(s) Oral once  dextrose 5%. 1000 milliLiter(s) (50 mL/Hr) IV Continuous <Continuous>  dextrose 5%. 1000 milliLiter(s) (100 mL/Hr) IV Continuous <Continuous>  dextrose 50% Injectable 25 Gram(s) IV Push once  dextrose 50% Injectable 12.5 Gram(s) IV Push once  dextrose 50% Injectable 25 Gram(s) IV Push once  glucagon  Injectable 1 milliGRAM(s) IntraMuscular once  insulin lispro (ADMELOG) corrective regimen sliding scale   SubCutaneous three times a day before meals  insulin lispro (ADMELOG) corrective regimen sliding scale   SubCutaneous at bedtime  metoprolol succinate ER 25 milliGRAM(s) Oral daily  sevelamer carbonate 800 milliGRAM(s) Oral three times a day with meals    SOCIAL HISTORY:  Denies ETOh,Smoking, illicit drug use  FAMILY HISTORY:  Family history of heart disease  mother, father, sisters-     Family history of hypertension  mother, father, sisiter-     Family history of diabetes mellitus  sister -         REVIEW OF SYSTEMS:  CONSTITUTIONAL: No weakness, fevers or chills  EYES/ENT: No visual changes;  No vertigo or throat pain   NECK: No pain or stiffness  RESPIRATORY: No cough, wheezing, hemoptysis; No shortness of breath  CARDIOVASCULAR: No chest pain or palpitations.  GASTROINTESTINAL: No abdominal or epigastric pain. No nausea, vomiting, or hematemesis; No diarrhea or constipation. No melena or hematochezia.  GENITOURINARY: No dysuria, frequency, foamy urine, urinary urgency, incontinence or hematuria  NEUROLOGICAL: No numbness or weakness  SKIN: No itching, burning, rashes, or lesions   VASCULAR: No bilateral lower extremity edema.   All other review of systems is negative unless indicated above.    VITALS:  T(F): 98.5 (21 @ 14:00), Max: 98.9 (21 @ 23:12)  HR: 94 (21 @ 14:00)  BP: 130/58 (21 @ 14:00)  RR: 18 (21 @ 14:00)  SpO2: 100% (21 @ 14:00)  Wt(kg): --    Height (cm): 172.7 ( @ 08:47)  Weight (kg): 85 ( @ 08:47)  BMI (kg/m2): 28.5 ( @ 08:47)  BSA (m2): 1.99 ( @ 08:47)    PHYSICAL EXAM:  Constitutional: NAD  HEENT: anicteric sclera, oropharynx clear, MMM  Neck: No JVD  Respiratory: CTAB, no wheezes, rales or rhonchi  Cardiovascular: S1, S2, RRR  Gastrointestinal: BS+, soft, NT/ND  Extremities: No cyanosis or clubbing. No peripheral edema  Neurological: A/O x 3, no focal deficits  Psychiatric: Normal mood, normal affect  : No CVA tenderness. No recinos.   Skin: No rashes  Vascular Access:    LABS:      137  |  93<L>  |  26<H>  ----------------------------<  177<H>  4.3   |  32<H>  |  6.29<H>    Ca    8.7      01 May 2021 09:57  Phos  4.4       Mg     2.1         TPro  7.0  /  Alb  3.8  /  TBili  0.4  /  DBili      /  AST  16  /  ALT  9   /  AlkPhos  79  04    Creatinine Trend: 6.29 <--, 5.03 <--                        8.1    11.61 )-----------( 60       ( 01 May 2021 09:57 )             26.3     Urine Studies:        RADIOLOGY & ADDITIONAL STUDIES:                 [facility] - S Suzi / Hieu S /D Daniel/ S Linh/ S Piedad/ Aydin Greene / AKIKO Baldwinu/ O Yumiko  service [phone], office [phone]  ---------------------------------------------------------------------------------------------------------------  Patient seen and examined    80F with PMHx Latter-day (will not take blood products), ESRD (MWF, L AVF), DM on insulin (only premeal novolog), HTN, moderate AS, ILR 2020 presenting with oral bleeding today now self-resolved. Pt had session of HD today and noted she had a clotting episode? there but was able to complete. When she was home she was about to eat dinner but then noticed mouth bleeding. Her daughter witnessed this and called EMS. It was noted by EMS that she had bleeding from the inside of the left cheek that self resolved since. Per patient she has had on and off bleeding within the mouth from biting on cheek and tongue. She had a recent oral surgery in March with teeth extractions (due to "teeth bone being weaker"). She takes ASA daily as well. The daughter was unable to be reached for collateral information or med rec confirmation. Patient denies any hemoptysis, hematemesis, hematochezia or melena, LH/dizziness, LOC, CP, SOB, cough, fevers, chills, diarrhea, oral pain.Renal consulted for ESRD and dialysis management     PAST MEDICAL & SURGICAL HISTORY:  ESRD (end stage renal disease) on dialysis    DM (diabetes mellitus)    Aortic stenosis  pt wants postpone TAVR after fistula , has Cardiology clearance    Hypertension    Gout    Diabetic retinopathy    Bilateral cataracts    Cough  hx of pt on inhaler under Pulmonology  care , last time used 2020    H/O syncope  2020- pt hospitalized , cardiac workup done , HD started loop recorder done    Refusal of blood transfusions as patient is Rose&#x27;s Witness    Fibroids  hx of    HLD (hyperlipidemia)    AV fistula  left arm- 2020    History of hysterectomy  1987    History of vascular access device  right chest permacath- 2020    History of loop recorder  left chest - 2020        Allergies: No Known Allergies    Home Medications Reviewed  Hospital Medications:   MEDICATIONS  (STANDING):  atorvastatin 20 milliGRAM(s) Oral at bedtime  dextrose 40% Gel 15 Gram(s) Oral once  dextrose 5%. 1000 milliLiter(s) (50 mL/Hr) IV Continuous <Continuous>  dextrose 5%. 1000 milliLiter(s) (100 mL/Hr) IV Continuous <Continuous>  dextrose 50% Injectable 25 Gram(s) IV Push once  dextrose 50% Injectable 12.5 Gram(s) IV Push once  dextrose 50% Injectable 25 Gram(s) IV Push once  glucagon  Injectable 1 milliGRAM(s) IntraMuscular once  insulin lispro (ADMELOG) corrective regimen sliding scale   SubCutaneous three times a day before meals  insulin lispro (ADMELOG) corrective regimen sliding scale   SubCutaneous at bedtime  metoprolol succinate ER 25 milliGRAM(s) Oral daily  sevelamer carbonate 800 milliGRAM(s) Oral three times a day with meals    SOCIAL HISTORY:  Denies ETOh,Smoking, illicit drug use  FAMILY HISTORY:  Family history of heart disease  mother, father, sisters-     Family history of hypertension  mother, father, sisiter-     Family history of diabetes mellitus  sister -     REVIEW OF SYSTEMS:  CONSTITUTIONAL: No weakness, fevers or chills  EYES/ENT: No visual changes;  No vertigo or throat pain   NECK: No pain or stiffness  RESPIRATORY: No cough, wheezing, hemoptysis; No shortness of breath  CARDIOVASCULAR: No chest pain or palpitations.  GASTROINTESTINAL: No abdominal or epigastric pain. No nausea, vomiting, or hematemesis; No diarrhea or constipation. No melena or hematochezia.  NEUROLOGICAL: No numbness or weakness  SKIN: No itching, burning, rashes, or lesions   VASCULAR: No bilateral lower extremity edema.   All other review of systems is negative unless indicated above.    VITALS:  T(F): 98.5 (21 @ 14:00), Max: 98.9 (21 @ 23:12)  HR: 94 (21 @ 14:00)  BP: 130/58 (21 @ 14:00)  RR: 18 (21 @ 14:00)  SpO2: 100% (21 @ 14:00)  Wt(kg): --    Height (cm): 172.7 ( @ 08:47)  Weight (kg): 85 ( @ 08:47)  BMI (kg/m2): 28.5 ( @ 08:47)  BSA (m2): 1.99 ( @ 08:47)    PHYSICAL EXAM:  Constitutional: NAD  HEENT: anicteric sclera  Neck: No JVD  Respiratory: CTAB, no wheezes, rales or rhonchi  Cardiovascular: S1, S2, RRR  Gastrointestinal: BS+, soft, NT/ND  Extremities: No peripheral edema  Neurological: A/O x 3  Psychiatric: Normal mood, normal affect  : No recinos.   Vascular Access: HOMERO AVF+thrill    LABS:      137  |  93<L>  |  26<H>  ----------------------------<  177<H>  4.3   |  32<H>  |  6.29<H>    Ca    8.7      01 May 2021 09:57  Phos  4.4       Mg     2.1         TPro  7.0  /  Alb  3.8  /  TBili  0.4  /  DBili      /  AST  16  /  ALT  9   /  AlkPhos  79  04-30    Creatinine Trend: 6.29 <--, 5.03 <--                        8.1    11.61 )-----------( 60       ( 01 May 2021 09:57 )             26.3     Urine Studies:        RADIOLOGY & ADDITIONAL STUDIES:      < from: Xray Chest 2 Views PA/Lat (21 @ 00:42) >  IMPRESSION:  Implanted loop recorder overlies lower medial left hemithorax.    Clear lungs. No pleural effusions or pneumothorax.    Cardiac and mediastinal silhouettes within normal limits.    Trachea midline.    Small metallic suture anchor in right humeral head. Unremarkable osseous structures.    Overlapping metallic mesh vascular stents in left axillary region.    < end of copied text >             [facility] - S Suzi / Hieu S /D Daniel/ S Linh/ ZAIRA Herbert/ Aydin Greene / AKIKO Baldwinu/ O Yumiko  service [phone], office [phone]  ---------------------------------------------------------------------------------------------------------------  Patient seen and examined bedside    80F with PMHx [employer] (will not take blood products), ESRD (MWF, our gp chronci HD pt from [facility]), DM on insulin, HTN, moderate AS, ILR 2020 presenting with oral bleeding today now self-resolved. Pt had session of HD yesterday, was complete. When she was home she was about to eat dinner but then noticed mouth bleeding. Her daughter witnessed this and called EMS. It was noted by EMS that she had bleeding from the inside of the left cheek that self resolved since. Per patient she has had on and off bleeding within the mouth from biting on cheek and tongue. She had a recent oral surgery in March with teeth extractions. Patient denies any CP, SOB, cough, fevers, chills, diarrhea, oral pain. Renal consulted for ESRD and dialysis management     PAST MEDICAL & SURGICAL HISTORY:  ESRD (end stage renal disease) on dialysis  DM (diabetes mellitus)  Aortic stenosis  pt wants postpone TAVR after fistula , has Cardiology clearance  Hypertension  Gout  Diabetic retinopathy  Bilateral cataracts    Cough  hx of pt on inhaler under Pulmonology  care , last time used 2020    H/O syncope  2020- pt hospitalized , cardiac workup done , HD started loop recorder done    Refusal of blood transfusions as patient is Jechelevah&#x27;s Witness    Fibroids  hx of    HLD (hyperlipidemia)    AV fistula  left arm- 2020    History of hysterectomy  1987    History of vascular access device  right chest permacath- 2020    History of loop recorder  left chest - 2020        Allergies: No Known Allergies    Home Medications Reviewed  Hospital Medications:   MEDICATIONS  (STANDING):  atorvastatin 20 milliGRAM(s) Oral at bedtime  dextrose 40% Gel 15 Gram(s) Oral once  dextrose 5%. 1000 milliLiter(s) (50 mL/Hr) IV Continuous <Continuous>  dextrose 5%. 1000 milliLiter(s) (100 mL/Hr) IV Continuous <Continuous>  dextrose 50% Injectable 25 Gram(s) IV Push once  dextrose 50% Injectable 12.5 Gram(s) IV Push once  dextrose 50% Injectable 25 Gram(s) IV Push once  glucagon  Injectable 1 milliGRAM(s) IntraMuscular once  insulin lispro (ADMELOG) corrective regimen sliding scale   SubCutaneous three times a day before meals  insulin lispro (ADMELOG) corrective regimen sliding scale   SubCutaneous at bedtime  metoprolol succinate ER 25 milliGRAM(s) Oral daily  sevelamer carbonate 800 milliGRAM(s) Oral three times a day with meals    SOCIAL HISTORY:  Denies ETOh,Smoking, illicit drug use  FAMILY HISTORY:  Family history of heart disease  mother, father, sisters-     Family history of hypertension  mother, father, sisiter-     Family history of diabetes mellitus  sister -     REVIEW OF SYSTEMS:  CONSTITUTIONAL: No weakness, fevers or chills  EYES/ENT: No visual changes;  No vertigo or throat pain   NECK: No pain or stiffness  RESPIRATORY: No cough, wheezing, hemoptysis; No shortness of breath  CARDIOVASCULAR: No chest pain or palpitations.  GASTROINTESTINAL: No abdominal or epigastric pain. No nausea, vomiting, or hematemesis; No diarrhea or constipation. No melena or hematochezia.  NEUROLOGICAL: No numbness or weakness  SKIN: No itching, burning, rashes, or lesions   VASCULAR: No bilateral lower extremity edema.   All other review of systems is negative unless indicated above.    VITALS:  T(F): 98.5 (21 @ 14:00), Max: 98.9 (21 @ 23:12)  HR: 94 (21 @ 14:00)  BP: 130/58 (21 @ 14:00)  RR: 18 (21 @ 14:00)  SpO2: 100% (21 @ 14:00)  Wt(kg): --    Height (cm): 172.7 ( @ 08:47)  Weight (kg): 85 ( @ 08:47)  BMI (kg/m2): 28.5 ( @ 08:47)  BSA (m2): 1.99 ( @ 08:47)    PHYSICAL EXAM:  Constitutional: NAD  HEENT: anicteric sclera  Neck: No JVD  Respiratory: CTAB, no wheezes, rales or rhonchi  Cardiovascular: S1, S2, RRR  Gastrointestinal: BS+, soft, NT/ND  Extremities: No peripheral edema  Neurological: A/O x 3  Psychiatric: Normal mood, normal affect  : No recinos.   Vascular Access: HOMERO AVF+thrill    LABS:      137  |  93<L>  |  26<H>  ----------------------------<  177<H>  4.3   |  32<H>  |  6.29<H>    Ca    8.7      01 May 2021 09:57  Phos  4.4       Mg     2.1         TPro  7.0  /  Alb  3.8  /  TBili  0.4  /  DBili      /  AST  16  /  ALT  9   /  AlkPhos  79  04-30    Creatinine Trend: 6.29 <--, 5.03 <--                        8.1    11.61 )-----------( 60       ( 01 May 2021 09:57 )             26.3     Urine Studies:        RADIOLOGY & ADDITIONAL STUDIES:      < from: Xray Chest 2 Views PA/Lat (21 @ 00:42) >  IMPRESSION:  Implanted loop recorder overlies lower medial left hemithorax.    Clear lungs. No pleural effusions or pneumothorax.    Cardiac and mediastinal silhouettes within normal limits.    Trachea midline.    Small metallic suture anchor in right humeral head. Unremarkable osseous structures.    Overlapping metallic mesh vascular stents in left axillary region.    < end of copied text >

## 2021-05-01 NOTE — PHYSICAL THERAPY INITIAL EVALUATION ADULT - PERTINENT HX OF CURRENT PROBLEM, REHAB EVAL
80F with PMHx Yarsani (will not take blood products), ESRD (MWF, L AVF), DM on insulin (only premeal novolog), HTN, moderate AS, ILR 9/2020 presenting with oral bleeding today now self-resolved.

## 2021-05-01 NOTE — H&P ADULT - PROBLEM SELECTOR PLAN 5
States she takes novolog 20u premeal at home. Last A1C 5.4 in September and at that time was discharged off insulin. Will only give ISS at this time as patient notes hypoglycemic events with her insulin. Likely should discontinue insulin on discharge or decrease dosing

## 2021-05-01 NOTE — CONSULT NOTE ADULT - SUBJECTIVE AND OBJECTIVE BOX
DATE OF SERVICE:  2021  Patient was seen,examined and evaluated  by me.ER evaluation, Labs and Hospital course was reviewed,    CHIEF COMPLAINT:Bleeding    HPI:80F with PMHx Bahai (will not take blood products), ESRD (MWF, L AVF), DM on insulin (only premeal novolog), HTN, moderate AS, ILR 2020 presenting with oral bleeding today now self-resolved. Pt had session of HD today and noted she had a clotting episode? there but was able to complete. When she was home she was about to eat dinner but then noticed mouth bleeding. Her daughter witnessed this and called EMS. It was noted by EMS that she had bleeding from the inside of the left cheek that self resolved since. Per patient she has had on and off bleeding within the mouth from biting on cheek and tongue. She had a recent oral surgery in March with teeth extractions (due to "teeth bone being weaker"). She takes ASA daily as well. The daughter was unable to be reached for collateral information or med rec confirmation. Patient denies any hemoptysis, hematemesis, hematochezia or melena, LH/dizziness, LOC, CP, SOB, cough, fevers, chills, diarrhea, oral pain.    In ED, leukocytosis to 17 and given cefepime (01 May 2021 04:04)      PAST MEDICAL & SURGICAL HISTORY:  ESRD (end stage renal disease) on dialysis    DM (diabetes mellitus)    Aortic stenosis  pt wants postpone TAVR after fistula , has Cardiology clearance    Hypertension    Gout    Diabetic retinopathy    Bilateral cataracts    Cough  hx of pt on inhaler under Pulmonology  care , last time used 2020    H/O syncope  2020- pt hospitalized , cardiac workup done , HD started loop recorder done    Refusal of blood transfusions as patient is [employer]&#x[employer]    Fibroids  hx of    HLD (hyperlipidemia)    AV fistula  left arm- 2020    History of hysterectomy  1987    History of vascular access device  right chest permacath- 2020    History of loop recorder  left chest - 2020        MEDICATIONS  (STANDING):  atorvastatin 20 milliGRAM(s) Oral at bedtime  glucagon  Injectable 1 milliGRAM(s) IntraMuscular once  insulin lispro (ADMELOG) corrective regimen sliding scale   SubCutaneous three times a day before meals  insulin lispro (ADMELOG) corrective regimen sliding scale   SubCutaneous at bedtime  metoprolol succinate ER 25 milliGRAM(s) Oral daily  sevelamer carbonate 800 milliGRAM(s) Oral three times a day with meals    MEDICATIONS  (PRN):  acetaminophen   Tablet .. 650 milliGRAM(s) Oral every 6 hours PRN Mild Pain (1 - 3)  ALBUTerol    90 MICROgram(s) HFA Inhaler 2 Puff(s) Inhalation every 6 hours PRN Wheezing      FAMILY HISTORY:  Family history of heart disease  mother, father, sisters-     Family history of hypertension  mother, father, sisiter-     Family history of diabetes mellitus  sister -       No family history of premature coronary artery disease or sudden cardiac death    SOCIAL HISTORY:  Smoking-[ ] Active  [ ] Former [x ] Non Smoker  Alcohol-[ x] Denies [ ] Social [ ] Daily  Ilicit Drug use-[ x] Denies [ ] Active user    REVIEW OF SYSTEMS:  Constitutional: [ ] fever, [ ]weight loss, [x ]fatigue   Activity [ ] Bedbound,[x ] Ambulates [x ] Unassisted[ ] Cane/Walker [ ] Assistence.  Effort tolerance:[ ] Excellent [ ] Good [ ] Fair [ x] Poor [ ]  Eyes: [ ] visual changes  Respiratory: [ ]shortness of breath;  [ ] cough, [ ]wheezing, [ ]chills, [ ]hemoptysis  Cardiovascular: [ ] chest pain, [ ]palpitations, [ ]dizziness,  [ ]leg swelling[ ]orthopnea [ ]PND  Gastrointestinal: [ ] abdominal pain, [ ]nausea, [ ]vomiting,  [ ]diarrhea,[ ]constipation  Genitourinary: [ ] dysuria, [ ] hematuria  Neurologic: [ ] headaches [ ] tremors[ ] weakness  Skin: [ ] itching, [ ]burning, [ ] rashes  Endocrine: [ ] heat or cold intolerance  Musculoskeletal: [ ] joint pain or swelling; [ ] muscle, back, or extremity pain  Psychiatric: [ ] depression, [ ]anxiety, [ ]mood swings, or [ ]difficulty sleeping  Hematologic: [ ] easy bruising, [ ] bleeding gums       [ x] All others negative	  [ ] Unable to obtain    Vital Signs Last 24 Hrs  T(C): 36.8 (01 May 2021 08:47), Max: 37.2 (2021 23:12)  T(F): 98.2 (01 May 2021 08:47), Max: 98.9 (2021 23:12)  HR: 84 (01 May 2021 08:47) (75 - 90)  BP: 122/60 (01 May 2021 08:47) (106/56 - 131/57)  RR: 18 (01 May 2021 08:47) (16 - 22)  SpO2: 100% (01 May 2021 08:47) (100% - 100%)  I&O's Summary      PHYSICAL EXAM:  General: No acute distress BMI-21  HEENT: EOMI, PERRL[ ] Icteric  Neck: Supple, No JVD  Lungs: Equal air entry bilaterally; [ ] Rales [ ] Rhonchi [ ] Wheezing  Heart: Regular rate and rhythm;[x ] Murmurs-  2 /6 [x ] Systolic [ ] Diastolic [ ] Radiation,No rubs, or gallops  Abdomen: Nontender, bowel sounds present  Extremities: No clubbing, cyanosis, or edema[ ] Calf tenderness  Nervous system:  Alert & Oriented X3, no focal deficits  Psychiatric: Normal affect  Skin: No rashes or lesions      LABS:      138  |  94<L>  |  20  ----------------------------<  206<H>  4.2   |  27  |  5.03<H>    Ca    8.9      2021 23:16  Phos  3.6     0430  Mg     1.9     0430    TPro  7.0  /  Alb  3.8  /  TBili  0.4  /  DBili  x   /  AST  16  /  ALT  9   /  AlkPhos  79  0430    Creatinine Trend: 5.03<--                        9.0    17.06 )-----------( 50       ( 2021 23:16 )             27.4       RADIOLOGY:    ECG [my interpretation]:    TELEMETRY:    ECHO:  Study Date: 2020  CONCLUSIONS:  1. Aortic valve not well visualized; appears calcified with likely significant stenosis. Peak transaorticvalve gradient equals 30 mm Hg, mean transaortic valve gradient equals 19 mm Hg.  2. Endocardial visualization enhanced with intravenous injection of echo contrast (Definity). Moderate segmental left ventricular systolic dysfunction. Basal inferior, basal inferolateral, basal inferoseptal wall hypokinesis.  3. Normal right ventricular size and function.    CATHETERIZATION:Study date: 2020  CORONARY VESSELS: The coronary circulation is right dominant.  LM:   --  LM: Angiography showed mild atherosclerosis with no flow limiting lesions.  LAD:   --  LAD: Angiography showed mild atherosclerosis with no flow limiting lesions.  CX:   --  Circumflex: Angiography showed mild atherosclerosis with no flow limiting lesions.  --  OM1: Angiography showed mild atherosclerosis with no flow limiting lesions.  --  OM2: Distal vessel angiography showed a very small sized vessel and moderate diffuse disease.  RCA:   --  Proximal RCA: Therewas a discrete 60 % stenosis at a site with no prior intervention. There was LEO grade 3 flow through the vessel (brisk flow). iFR positive  --  Mid RCA: There was a tubular 40 % stenosis at a site with no prior intervention. There was LEO grade3 flow through the vessel (brisk flow).  --  Distal RCA: There was a tubular 60 % stenosis at a site with no prior intervention. There was LEO grade 3 flow through the vessel (brisk flow). iFR positive  --  RPDA: The vessel was medium sized. Angiography showed mild atherosclerosis with no flow limiting lesions.  COMPLICATIONS: There were no complications.  DIAGNOSTIC IMPRESSIONS: Moderate AS, Mean Gradient 30mmHg, LEVI 1.1sq cm (correlated with TTE).  Single vessel CAD of RCA. Lesion is stable with Normal LEO-3 flow.

## 2021-05-02 LAB
ANION GAP SERPL CALC-SCNC: 16 MMOL/L — HIGH (ref 7–14)
BUN SERPL-MCNC: 46 MG/DL — HIGH (ref 7–23)
CALCIUM SERPL-MCNC: 8.5 MG/DL — SIGNIFICANT CHANGE UP (ref 8.4–10.5)
CHLORIDE SERPL-SCNC: 93 MMOL/L — LOW (ref 98–107)
CO2 SERPL-SCNC: 25 MMOL/L — SIGNIFICANT CHANGE UP (ref 22–31)
COVID-19 SPIKE DOMAIN AB INTERP: POSITIVE
COVID-19 SPIKE DOMAIN ANTIBODY RESULT: 4.37 U/ML — HIGH
CREAT SERPL-MCNC: 8.38 MG/DL — HIGH (ref 0.5–1.3)
DIALYSIS INSTRUMENT RESULT - HEPATITIS B SURFACE ANTIGEN: NEGATIVE — SIGNIFICANT CHANGE UP
GLUCOSE BLDC GLUCOMTR-MCNC: 130 MG/DL — HIGH (ref 70–99)
GLUCOSE BLDC GLUCOMTR-MCNC: 153 MG/DL — HIGH (ref 70–99)
GLUCOSE BLDC GLUCOMTR-MCNC: 169 MG/DL — HIGH (ref 70–99)
GLUCOSE BLDC GLUCOMTR-MCNC: 204 MG/DL — HIGH (ref 70–99)
GLUCOSE SERPL-MCNC: 157 MG/DL — HIGH (ref 70–99)
HBV CORE AB SER-ACNC: SIGNIFICANT CHANGE UP
HBV SURFACE AB SER-ACNC: >1000 MIU/ML — SIGNIFICANT CHANGE UP
HCT VFR BLD CALC: 25.1 % — LOW (ref 34.5–45)
HCV AB S/CO SERPL IA: 0.1 S/CO — SIGNIFICANT CHANGE UP (ref 0–0.99)
HCV AB SERPL-IMP: SIGNIFICANT CHANGE UP
HGB BLD-MCNC: 7.8 G/DL — LOW (ref 11.5–15.5)
MCHC RBC-ENTMCNC: 31.1 GM/DL — LOW (ref 32–36)
MCHC RBC-ENTMCNC: 32.2 PG — SIGNIFICANT CHANGE UP (ref 27–34)
MCV RBC AUTO: 103.7 FL — HIGH (ref 80–100)
NRBC # BLD: 0 /100 WBCS — SIGNIFICANT CHANGE UP
NRBC # FLD: 0.02 K/UL — HIGH
PHOSPHATE SERPL-MCNC: 5.4 MG/DL — HIGH (ref 2.5–4.5)
PLATELET # BLD AUTO: 79 K/UL — LOW (ref 150–400)
POTASSIUM SERPL-MCNC: 4.4 MMOL/L — SIGNIFICANT CHANGE UP (ref 3.5–5.3)
POTASSIUM SERPL-SCNC: 4.4 MMOL/L — SIGNIFICANT CHANGE UP (ref 3.5–5.3)
RBC # BLD: 2.42 M/UL — LOW (ref 3.8–5.2)
RBC # FLD: 15.2 % — HIGH (ref 10.3–14.5)
SARS-COV-2 IGG+IGM SERPL QL IA: 4.37 U/ML — HIGH
SARS-COV-2 IGG+IGM SERPL QL IA: POSITIVE
SODIUM SERPL-SCNC: 134 MMOL/L — LOW (ref 135–145)
WBC # BLD: 7.83 K/UL — SIGNIFICANT CHANGE UP (ref 3.8–10.5)
WBC # FLD AUTO: 7.83 K/UL — SIGNIFICANT CHANGE UP (ref 3.8–10.5)

## 2021-05-02 RX ORDER — ERYTHROPOIETIN 10000 [IU]/ML
10000 INJECTION, SOLUTION INTRAVENOUS; SUBCUTANEOUS
Refills: 0 | Status: DISCONTINUED | OUTPATIENT
Start: 2021-05-02 | End: 2021-05-04

## 2021-05-02 RX ADMIN — Medication 1: at 17:26

## 2021-05-02 RX ADMIN — Medication 1: at 08:41

## 2021-05-02 RX ADMIN — SEVELAMER CARBONATE 800 MILLIGRAM(S): 2400 POWDER, FOR SUSPENSION ORAL at 08:41

## 2021-05-02 RX ADMIN — SEVELAMER CARBONATE 800 MILLIGRAM(S): 2400 POWDER, FOR SUSPENSION ORAL at 17:26

## 2021-05-02 RX ADMIN — Medication 25 MILLIGRAM(S): at 05:12

## 2021-05-02 RX ADMIN — ATORVASTATIN CALCIUM 20 MILLIGRAM(S): 80 TABLET, FILM COATED ORAL at 21:08

## 2021-05-02 RX ADMIN — SEVELAMER CARBONATE 800 MILLIGRAM(S): 2400 POWDER, FOR SUSPENSION ORAL at 12:36

## 2021-05-02 NOTE — PROGRESS NOTE ADULT - SUBJECTIVE AND OBJECTIVE BOX
DATE OF SERVICE:05/02/2021  Patient was seen and examined ,interim events noted.Consultant notes ,Labs,Telemetry reviewed by me    PRESENTING CC:Dyspnea    HPI and HOSPITAL COURSE: HPI:  80F with PMHx [employer] (will not take blood products), ESRD (MWF, L AVF), DM on insulin (only premeal novolog), HTN, moderate AS, ILR 9/2020 presenting with oral bleeding today now self-resolved. Pt had session of HD today and noted she had a clotting episode? there but was able to complete. When she was home she was about to eat dinner but then noticed mouth bleeding. Her daughter witnessed this and called EMS. It was noted by EMS that she had bleeding from the inside of the left cheek that self resolved since. Per patient she has had on and off bleeding within the mouth from biting on cheek and tongue. She had a recent oral surgery in March with teeth extractions (due to "teeth bone being weaker"). She takes ASA daily as well. The daughter was unable to be reached for collateral information or med [facility] confirmation. Patient denies any hemoptysis, hematemesis, hematochezia or melena, LH/dizziness, LOC, CP, SOB, cough, fevers, chills, diarrhea, oral pain.    In ED, leukocytosis to 17 and given cefepime (01 May 2021 04:04)      INTERIM EVENTS:      PMH -reviewed admission note, no change since admission  Heart Failure: Acute [ ] Chronic [ ] Acute on Chronic [ ] Diastolic [ ] Systolic [ ] Combined Systolic and Diastolic[ ]  KIANNA[ ]  ATN[ ]  CKD I [ ] CKDII [ ] CKD III [ ] CKD IV [ ] CKD V [ ] ESRD[ ]  HTN[ ] CVA[ ] DM[ ] COPD[ ] COVID[ ] AF[ ]  PPM[ ] ICD[ ]    MEDICATIONS  (STANDING):  atorvastatin 20 milliGRAM(s) Oral at bedtime  dextrose 40% Gel 15 Gram(s) Oral once  dextrose 5%. 1000 milliLiter(s) (50 mL/Hr) IV Continuous <Continuous>  dextrose 5%. 1000 milliLiter(s) (100 mL/Hr) IV Continuous <Continuous>  dextrose 50% Injectable 25 Gram(s) IV Push once  dextrose 50% Injectable 12.5 Gram(s) IV Push once  dextrose 50% Injectable 25 Gram(s) IV Push once  epoetin shay-epbx (RETACRIT) Injectable 80900 Unit(s) IV Push <User Schedule>  glucagon  Injectable 1 milliGRAM(s) IntraMuscular once  insulin lispro (ADMELOG) corrective regimen sliding scale   SubCutaneous three times a day before meals  insulin lispro (ADMELOG) corrective regimen sliding scale   SubCutaneous at bedtime  metoprolol succinate ER 25 milliGRAM(s) Oral daily  sevelamer carbonate 800 milliGRAM(s) Oral three times a day with meals    MEDICATIONS  (PRN):  acetaminophen   Tablet .. 650 milliGRAM(s) Oral every 6 hours PRN Mild Pain (1 - 3)  ALBUTerol    90 MICROgram(s) HFA Inhaler 2 Puff(s) Inhalation every 6 hours PRN Wheezing            REVIEW OF SYSTEMS:  Constitutional: [ ] fever, [ ]weight loss,  [ ]fatigue  Eyes: [ ] visual changes  Respiratory: [ ]shortness of breath;  [ ] cough, [ ]wheezing, [ ]chills, [ ]hemoptysis  Cardiovascular: [ ] chest pain, [ ]palpitations, [ ]dizziness,  [ ]leg swelling[ ]orthopnea[ ]PND  Gastrointestinal: [ ] abdominal pain, [ ]nausea, [ ]vomiting,  [ ]diarrhea [ ]Constipation [ ]Melena  Genitourinary: [ ] dysuria, [ ] hematuria [ ]Pineda  Neurologic: [ ] headaches [ ] tremors[ ]weakness [ ]Paralysis Right[ ] Left[ ]  Skin: [ ] itching, [ ]burning, [ ] rashes  Endocrine: [ ] heat or cold intolerance  Musculoskeletal: [ ] joint pain or swelling; [ ] muscle, back, or extremity pain  Psychiatric: [ ] depression, [ ]anxiety, [ ]mood swings, or [ ]difficulty sleeping  Hematologic: [ ] easy bruising, [ ] bleeding gums    [x] All remaining systems negative except as per above.   [ ]Unable to obtain.    Vital Signs Last 24 Hrs  T(C): 37.3 (02 May 2021 13:01), Max: 37.3 (02 May 2021 13:01)  T(F): 99.1 (02 May 2021 13:01), Max: 99.1 (02 May 2021 13:01)  HR: 94 (02 May 2021 13:01) (92 - 100)  BP: 149/69 (02 May 2021 13:01) (122/60 - 149/69)  BP(mean): --  RR: 17 (02 May 2021 13:01) (17 - 18)  SpO2: 100% (02 May 2021 13:01) (100% - 100%)  I&O's Summary      PHYSICAL EXAM:  General: No acute distress BMI-  HEENT: EOMI, PERRL  Neck: Supple, [ ] JVD  Lungs: Equal air entry bilaterally; [ ] rales [ ] wheezing [ ] rhonchi  Heart: Regular rate and rhythm; [ ] murmur   /6 [ ] systolic [ ] diastolic [ ] radiation[ ] rubs [ ]  gallops  Abdomen: Nontender, bowel sounds present  Extremities: No clubbing, cyanosis, [ ] edema [ ]Pulses  equal and intact  Nervous system:  Alert & Oriented X3, no focal deficits  Psychiatric: Normal affect  Skin: No rashes or lesions    LABS:  05-02    134<L>  |  93<L>  |  46<H>  ----------------------------<  157<H>  4.4   |  25  |  8.38<H>    Ca    8.5      02 May 2021 07:50  Phos  5.4     05-02  Mg     2.1     05-01    TPro  7.0  /  Alb  3.8  /  TBili  0.4  /  DBili  x   /  AST  16  /  ALT  9   /  AlkPhos  79  04-30    Creatinine Trend: 8.38<--, 6.29<--, 5.03<--                        7.8    7.83  )-----------( 79       ( 02 May 2021 07:50 )             25.1         Cardiac Enzymes:           RADIOLOGY:    ECG [my interpretation]:    TELEMETRY:Reviewed monitor tracings-    ECHO:    STRESS TEST:    CATHETERIZATION:      IMPRESSION AND PLAN:       DATE OF SERVICE:05/02/2021  Patient was seen and examined ,interim events noted.Consultant notes ,Labs,Telemetry reviewed by me    PRESENTING CC:Dyspnea    HPI and HOSPITAL COURSE: HPI:  80F with PMHx [employer] (will not take blood products), ESRD (MWF, L AVF), DM on insulin (only premeal novolog), HTN, moderate AS, ILR 9/2020 presenting with oral bleeding today now self-resolved. Pt had session of HD today and noted she had a clotting episode? there but was able to complete. When she was home she was about to eat dinner but then noticed mouth bleeding. Her daughter witnessed this and called EMS. It was noted by EMS that she had bleeding from the inside of the left cheek that self resolved since. Per patient she has had on and off bleeding within the mouth from biting on cheek and tongue. She had a recent oral surgery in March with teeth extractions (due to "teeth bone being weaker"). She takes ASA daily as well. The daughter was unable to be reached for collateral information or med rec confirmation. Patient denies any hemoptysis, hematemesis, hematochezia or melena, LH/dizziness, LOC, CP, SOB, cough, fevers, chills, diarrhea, oral pain.    In ED, leukocytosis to 17 and given cefepime (01 May 2021 04:04)      INTERIM EVENTS:No overnight events, denied sob. No complaints today.       PMH -reviewed admission note, no change since admission  Heart Failure: Acute [ ] Chronic [ ] Acute on Chronic [ ] Diastolic [ ] Systolic [ ] Combined Systolic and Diastolic[ ]  KIANNA[ ]  ATN[ ]  CKD I [ ] CKDII [ ] CKD III [ ] CKD IV [ ] CKD V [ ] ESRD[ ]  HTN[ ] CVA[ ] DM[ ] COPD[ ] COVID[ ] AF[ ]  PPM[ ] ICD[ ]    MEDICATIONS  (STANDING):  atorvastatin 20 milliGRAM(s) Oral at bedtime  dextrose 40% Gel 15 Gram(s) Oral once  dextrose 5%. 1000 milliLiter(s) (50 mL/Hr) IV Continuous <Continuous>  dextrose 5%. 1000 milliLiter(s) (100 mL/Hr) IV Continuous <Continuous>  dextrose 50% Injectable 25 Gram(s) IV Push once  dextrose 50% Injectable 12.5 Gram(s) IV Push once  dextrose 50% Injectable 25 Gram(s) IV Push once  epoetin shay-epbx (RETACRIT) Injectable 62019 Unit(s) IV Push <User Schedule>  glucagon  Injectable 1 milliGRAM(s) IntraMuscular once  insulin lispro (ADMELOG) corrective regimen sliding scale   SubCutaneous three times a day before meals  insulin lispro (ADMELOG) corrective regimen sliding scale   SubCutaneous at bedtime  metoprolol succinate ER 25 milliGRAM(s) Oral daily  sevelamer carbonate 800 milliGRAM(s) Oral three times a day with meals    MEDICATIONS  (PRN):  acetaminophen   Tablet .. 650 milliGRAM(s) Oral every 6 hours PRN Mild Pain (1 - 3)  ALBUTerol    90 MICROgram(s) HFA Inhaler 2 Puff(s) Inhalation every 6 hours PRN Wheezing            REVIEW OF SYSTEMS:  Constitutional: [ ] fever, [ ]weight loss,  [ ]fatigue  Eyes: [ ] visual changes  Respiratory: [ ]shortness of breath;  [ ] cough, [ ]wheezing, [ ]chills, [ ]hemoptysis  Cardiovascular: [ ] chest pain, [ ]palpitations, [ ]dizziness,  [ ]leg swelling[ ]orthopnea[ ]PND  Gastrointestinal: [ ] abdominal pain, [ ]nausea, [ ]vomiting,  [ ]diarrhea [ ]Constipation [ ]Melena  Genitourinary: [ ] dysuria, [ ] hematuria [ ]Pineda  Neurologic: [ ] headaches [ ] tremors[ ]weakness [ ]Paralysis Right[ ] Left[ ]  Skin: [ ] itching, [ ]burning, [ ] rashes  Endocrine: [ ] heat or cold intolerance  Musculoskeletal: [ ] joint pain or swelling; [ ] muscle, back, or extremity pain  Psychiatric: [ ] depression, [ ]anxiety, [ ]mood swings, or [ ]difficulty sleeping  Hematologic: [ ] easy bruising, [ ] bleeding gums    [x] All remaining systems negative except as per above.   [ ]Unable to obtain.    Vital Signs Last 24 Hrs  T(C): 37.3 (02 May 2021 13:01), Max: 37.3 (02 May 2021 13:01)  T(F): 99.1 (02 May 2021 13:01), Max: 99.1 (02 May 2021 13:01)  HR: 94 (02 May 2021 13:01) (92 - 100)  BP: 149/69 (02 May 2021 13:01) (122/60 - 149/69)  RR: 17 (02 May 2021 13:01) (17 - 18)  SpO2: 100% (02 May 2021 13:01) (100% - 100%)  I&O's Summary      PHYSICAL EXAM:  General: No acute distress BMI-26  HEENT: EOMI, PERRL  Neck: Supple, [ ] JVD  Lungs: Equal air entry bilaterally; [ ] rales [ ] wheezing [ ] rhonchi  Heart: Regular rate and rhythm; [x ] murmur  2 /6 [ x] systolic [ ] diastolic [ ] radiation[ ] rubs [ ]  gallops  Abdomen: Nontender, bowel sounds present  Extremities: No clubbing, cyanosis, [ ] edema [ ]Pulses  equal and intact  Nervous system:  Alert & Oriented X3, no focal deficits  Psychiatric: Normal affect  Skin: No rashes or lesions    LABS:  05-02    134<L>  |  93<L>  |  46<H>  ----------------------------<  157<H>  4.4   |  25  |  8.38<H>    Ca    8.5      02 May 2021 07:50  Phos  5.4     05-02  Mg     2.1     05-01    TPro  7.0  /  Alb  3.8  /  TBili  0.4  /  DBili  x   /  AST  16  /  ALT  9   /  AlkPhos  79  04-30    Creatinine Trend: 8.38<--, 6.29<--, 5.03<--                        7.8    7.83  )-----------( 79       ( 02 May 2021 07:50 )             25.1           IMPRESSION AND PLAN:  80F with PMHx [employer] (will not take blood products), ESRD (MWF, L AVF), DM on insulin (only premeal novolog), HTN, moderate AS, ILR 9/2020 presenting with oral bleeding today now self-resolved. Found to have leukocytosis    Problem/Plan - 1:  ·  Problem: Oral bleeding.  Plan: Left inner cheek bleeding now resolved most likely due to cheek biting. Patient has had multiple instances of tongue/cheek biting in the last few weeks and takes ASA. Had prior teeth extractions and awaiting dentures  -would speak to dentistry outpatient regarding patient's dentures and if further extractions are planned  -hold ASA  -continue to monitor for signs of bleeding  -denies hemoptysis, hematemesis  -monitor thrombocytopenia.     Problem/Plan - 2:  ·  Problem: Leukocytosis, unspecified type.  Plan: Leukocytosis to 17. Unclear etiology at this time. Afebrile, denies respiratory, abdominal, urinary complaints. Does not appear to be dental infection either as denies purulent drainage from mouth and no tooth pain/swelling. Only area of swelling is area of likely prior cut/bite in left cheek  -f/u BCx x2  -f/u UA  -CXR clear  -trend WBC, fever curve  -s/p cefepime in ED  -would monitor off abx for now as patient appears well and denying complaints.     Problem/Plan - 3:  ·  Problem: Thrombocytopenia.  Plan: Plt 50 and prior has been 120s. May be multifactorial - ASA use, heparin with HD?, Aortic stenosis, ESRD.   -hold ASA  -monitor plt and if continues to be lower than baseline and recurrent bleeding would have hematology evaluation.     Problem/Plan - 4:  ·  Problem: ESRD (end stage renal disease) on dialysis.  Plan: Nephrology consult in AM  Sevelamer.     Problem/Plan - 5:  ·  Problem: Type 2 diabetes mellitus with hyperglycemia, with long-term current use of insulin.  Plan: States she takes novolog 20u premeal at home. Last A1C 5.4 in September and at that time was discharged off insulin. Will only give ISS at this time as patient notes hypoglycemic events with her insulin. Likely should discontinue insulin on discharge or decrease dosing.     Problem/Plan - 6:  Problem: Aortic valve stenosis, . Plan: Resume home BB. Med rec for other medications.    Problem/Plan - 7:  ·  Problem: Hypertension, unspecified type.  Plan: Resume BB. Med rec.     Problem/Plan - 8:  ·  Problem: Hyperlipidemia, unspecified hyperlipidemia type.  Plan: Resume statin.     Problem/Plan - 9:  ·  Problem: Medication management.  Plan: Patient does not know all of her medications and unable to reach daughter for full med rec. Please perform med rec in AM.     Problem/Plan - 10:  Problem: Need for prophylactic measure. Plan; SCDs, hold chemical DVT ppx given plt 50 and oral bleeding  Renal/consistent carb/DASH/TLC diet  PT eval.

## 2021-05-02 NOTE — CONSULT NOTE ADULT - SUBJECTIVE AND OBJECTIVE BOX
80F with PMHx Jew (will not take blood products), ESRD (MWF, L AVF), DM on insulin (only premeal novolog), HTN, moderate AS, ILR 2020 presenting with oral bleeding on day of admission, now self-resolved.   It was noted by EMS that she had bleeding from the inside of the left cheek that self resolved since. Per patient she has had on and off bleeding within the mouth from biting on cheek and tongue. She had a recent oral surgery in March with teeth extractions (due to "teeth bone being weaker"). She takes ASA daily as well.   Patient denies any hemoptysis, hematemesis, hematochezia or melena, LH/dizziness, LOC, CP, SOB, cough, fevers, chills, diarrhea, oral pain.      PAST MEDICAL HISTORY:  Aortic stenosis pt wants postpone TAVR after fistula , has Cardiology clearance    Bilateral cataracts     Cough hx of pt on inhaler under Pulmonology  care , last time used 2020    Diabetic retinopathy     DM (diabetes mellitus)     ESRD (end stage renal disease) on dialysis     Fibroids hx of    Gout     H/O syncope 2020- pt hospitalized , cardiac workup done , HD started loop recorder done    HLD (hyperlipidemia)     Hypertension     Refusal of blood transfusions as patient is [employer].     PAST SURGICAL HISTORY:  AV fistula left arm- 2020    History of hysterectomy 1987    History of loop recorder left chest - 2020    History of vascular access device right chest permacath- 2020.     FAMILY HISTORY:  Family history of diabetes mellitus, sister -   Family history of heart disease, mother, father, sisters-   Family history of hypertension, mother, father, sisiter- .    Social History:  Social History (marital status, living situation, occupation, tobacco use, alcohol and drug use, and sexual history): Denies smoking, alcohol or drug use      acetaminophen   Tablet .. 650 milliGRAM(s) Oral every 6 hours PRN  ALBUTerol    90 MICROgram(s) HFA Inhaler 2 Puff(s) Inhalation every 6 hours PRN  atorvastatin 20 milliGRAM(s) Oral at bedtime  epoetin shay-epbx (RETACRIT) Injectable 98659 Unit(s) IV Push <User Schedule>  glucagon  Injectable 1 milliGRAM(s) IntraMuscular once  insulin lispro (ADMELOG) corrective regimen sliding scale   SubCutaneous three times a day before meals  insulin lispro (ADMELOG) corrective regimen sliding scale   SubCutaneous at bedtime  metoprolol succinate ER 25 milliGRAM(s) Oral daily  sevelamer carbonate 800 milliGRAM(s) Oral three times a day with meals      No Known Allergies      ROS otherwise negative     T(C): 37.3 (21 @ 13:01), Max: 37.3 (21 @ 13:01)  HR: 94 (21 @ 13:01) (92 - 100)  BP: 149/69 (21 @ 13:01) (122/60 - 149/69)  RR: 17 (21 @ 13:01) (17 - 18)  SpO2: 100% (21 @ 13:01) (100% - 100%)  PHYSICAL EXAM  Gen:  laying in bed, nad  H:  anicteric, eomi  CV:  RRR, S1, S2  Lungs:  CTA b/l  Ab soft/nt/nd  Ext:  no edema                            7.8    7.83  )-----------( 79       ( 02 May 2021 07:50 )             25.1                         8.1    11.61 )-----------( 60       ( 01 May 2021 09:57 )             26.3                         9.0    17.06 )-----------( 50       ( 2021 23:16 )             27.4   05-02    134<L>  |  93<L>  |  46<H>  ----------------------------<  157<H>  4.4   |  25  |  8.38<H>    Ca    8.5      02 May 2021 07:50  Phos  5.4     05-02  Mg     2.1     05-01    TPro  7.0  /  Alb  3.8  /  TBili  0.4  /  DBili  x   /  AST  16  /  ALT  9   /  AlkPhos  79     80F with PMHx Quaker (will not take blood products), ESRD (MWF, L AVF), DM on insulin (only premeal novolog), HTN, moderate AS, ILR 2020 presenting with oral bleeding on day of admission, now self-resolved.   It was noted by EMS that she had bleeding from the inside of the left cheek that self resolved since. Per patient she has had on and off bleeding within the mouth from biting on cheek and tongue. She had a recent oral surgery in March with teeth extractions (due to "teeth bone being weaker"). She takes ASA daily as well.   Patient denies any hemoptysis, hematemesis, hematochezia or melena, LH/dizziness, LOC, CP, SOB, cough, fevers, chills, diarrhea, oral pain.      PAST MEDICAL HISTORY:  Aortic stenosis pt wants postpone TAVR after fistula , has Cardiology clearance    Bilateral cataracts     Cough hx of pt on inhaler under Pulmonology  care , last time used 2020    Diabetic retinopathy     DM (diabetes mellitus)     ESRD (end stage renal disease) on dialysis     Fibroids hx of    Gout     H/O syncope 2020- pt hospitalized , cardiac workup done , HD started loop recorder done    HLD (hyperlipidemia)     Hypertension     Refusal of blood transfusions as patient is Mormonism.     PAST SURGICAL HISTORY:  AV fistula left arm- 2020    History of hysterectomy 1987    History of loop recorder left chest - 2020    History of vascular access device right chest permacath- 2020.     FAMILY HISTORY:  Family history of diabetes mellitus, sister -   Family history of heart disease, mother, father, sisters-   Family history of hypertension, mother, father, sisiter- .    Social History:  Social History (marital status, living situation, occupation, tobacco use, alcohol and drug use, and sexual history): Denies smoking, alcohol or drug use      acetaminophen   Tablet .. 650 milliGRAM(s) Oral every 6 hours PRN  ALBUTerol    90 MICROgram(s) HFA Inhaler 2 Puff(s) Inhalation every 6 hours PRN  atorvastatin 20 milliGRAM(s) Oral at bedtime  epoetin shay-epbx (RETACRIT) Injectable 06107 Unit(s) IV Push <User Schedule>  glucagon  Injectable 1 milliGRAM(s) IntraMuscular once  insulin lispro (ADMELOG) corrective regimen sliding scale   SubCutaneous three times a day before meals  insulin lispro (ADMELOG) corrective regimen sliding scale   SubCutaneous at bedtime  metoprolol succinate ER 25 milliGRAM(s) Oral daily  sevelamer carbonate 800 milliGRAM(s) Oral three times a day with meals      No Known Allergies      ROS otherwise negative     T(C): 37.3 (21 @ 13:01), Max: 37.3 (21 @ 13:01)  HR: 94 (21 @ 13:01) (92 - 100)  BP: 149/69 (21 @ 13:01) (122/60 - 149/69)  RR: 17 (21 @ 13:01) (17 - 18)  SpO2: 100% (21 @ 13:01) (100% - 100%)  PHYSICAL EXAM  Gen:  laying in bed, nad  H:  anicteric, eomi;  left buccal mucosa with healing abrasion; no mucosal bleeding noted.  Ext:  no edema                            7.8    7.83  )-----------( 79       ( 02 May 2021 07:50 )             25.1                         8.1    11.61 )-----------( 60       ( 01 May 2021 09:57 )             26.3                         9.0    17.06 )-----------( 50       ( 2021 23:16 )             27.4   05-02    134<L>  |  93<L>  |  46<H>  ----------------------------<  157<H>  4.4   |  25  |  8.38<H>    Ca    8.5      02 May 2021 07:50  Phos  5.4     05-02  Mg     2.1     05-01    TPro  7.0  /  Alb  3.8  /  TBili  0.4  /  DBili  x   /  AST  16  /  ALT  9   /  AlkPhos  79  0430

## 2021-05-02 NOTE — PROGRESS NOTE ADULT - SUBJECTIVE AND OBJECTIVE BOX
SUBJECTIVE / OVERNIGHT EVENTS:      MEDICATIONS  (STANDING):  atorvastatin 20 milliGRAM(s) Oral at bedtime  dextrose 40% Gel 15 Gram(s) Oral once  dextrose 5%. 1000 milliLiter(s) (50 mL/Hr) IV Continuous <Continuous>  dextrose 5%. 1000 milliLiter(s) (100 mL/Hr) IV Continuous <Continuous>  dextrose 50% Injectable 25 Gram(s) IV Push once  dextrose 50% Injectable 12.5 Gram(s) IV Push once  dextrose 50% Injectable 25 Gram(s) IV Push once  epoetin shay-epbx (RETACRIT) Injectable 87697 Unit(s) IV Push <User Schedule>  glucagon  Injectable 1 milliGRAM(s) IntraMuscular once  insulin lispro (ADMELOG) corrective regimen sliding scale   SubCutaneous three times a day before meals  insulin lispro (ADMELOG) corrective regimen sliding scale   SubCutaneous at bedtime  metoprolol succinate ER 25 milliGRAM(s) Oral daily  sevelamer carbonate 800 milliGRAM(s) Oral three times a day with meals    MEDICATIONS  (PRN):  acetaminophen   Tablet .. 650 milliGRAM(s) Oral every 6 hours PRN Mild Pain (1 - 3)  ALBUTerol    90 MICROgram(s) HFA Inhaler 2 Puff(s) Inhalation every 6 hours PRN Wheezing        CAPILLARY BLOOD GLUCOSE      POCT Blood Glucose.: 204 mg/dL (02 May 2021 22:03)  POCT Blood Glucose.: 153 mg/dL (02 May 2021 17:23)  POCT Blood Glucose.: 130 mg/dL (02 May 2021 12:08)  POCT Blood Glucose.: 169 mg/dL (02 May 2021 08:19)    I&O's Summary      Constitutional: No fever, fatigue  Skin: No rash.  Eyes: No recent vision problems or eye pain.  ENT: No congestion, ear pain, or sore throat.  Cardiovascular: No chest pain or palpation.  Respiratory: No cough, shortness of breath, congestion, or wheezing.  Gastrointestinal: No abdominal pain, nausea, vomiting, or diarrhea.  Genitourinary: No dysuria.  Musculoskeletal: No joint swelling.  Neurologic: No headache.    PHYSICAL EXAM:  GENERAL: NAD  EYES: EOMI, PERRLA  NECK: Supple, No JVD  CHEST/LUNG:   HEART:  S1 , S2 +  ABDOMEN:   EXTREMITIES:    NEUROLOGY:      LABS:                        7.8    7.83  )-----------( 79       ( 02 May 2021 07:50 )             25.1     05-02    134<L>  |  93<L>  |  46<H>  ----------------------------<  157<H>  4.4   |  25  |  8.38<H>    Ca    8.5      02 May 2021 07:50  Phos  5.4     05-02  Mg     2.1     05-    TPro  7.0  /  Alb  3.8  /  TBili  0.4  /  DBili  x   /  AST  16  /  ALT  9   /  AlkPhos  79  0430          Urinalysis Basic - ( 01 May 2021 16:23 )    Color: Yellow / Appearance: Slightly Turbid / S.016 / pH: x  Gluc: x / Ketone: Negative  / Bili: Negative / Urobili: <2 mg/dL   Blood: x / Protein: 100 mg/dL / Nitrite: Negative   Leuk Esterase: Negative / RBC: 7 /HPF / WBC 3-6 /HPF   Sq Epi: x / Non Sq Epi: Many / Bacteria: Few        RADIOLOGY & ADDITIONAL TESTS:    Imaging Personally Reviewed:    Consultant(s) Notes Reviewed:      Care Discussed with Consultants/Other Providers:

## 2021-05-02 NOTE — PROGRESS NOTE ADULT - SUBJECTIVE AND OBJECTIVE BOX
[facility] - S Suzi / Hieu S /D Daniel/ S Linh/ S Piedad/ Aydin Greene / AKIKO Baldwinu/ O Yumiko  service -[phone], office [phone]  ---------------------------------------------------------------------------------------------------------------    Patient seen and examined bedside    Subjective and Objective: No overnight events, denied sob. No complaints today.     Allergies: No Known Allergies      Hospital Medications:   MEDICATIONS  (STANDING):  atorvastatin 20 milliGRAM(s) Oral at bedtime  dextrose 40% Gel 15 Gram(s) Oral once  dextrose 5%. 1000 milliLiter(s) (50 mL/Hr) IV Continuous <Continuous>  dextrose 5%. 1000 milliLiter(s) (100 mL/Hr) IV Continuous <Continuous>  dextrose 50% Injectable 25 Gram(s) IV Push once  dextrose 50% Injectable 12.5 Gram(s) IV Push once  dextrose 50% Injectable 25 Gram(s) IV Push once  glucagon  Injectable 1 milliGRAM(s) IntraMuscular once  insulin lispro (ADMELOG) corrective regimen sliding scale   SubCutaneous three times a day before meals  insulin lispro (ADMELOG) corrective regimen sliding scale   SubCutaneous at bedtime  metoprolol succinate ER 25 milliGRAM(s) Oral daily  sevelamer carbonate 800 milliGRAM(s) Oral three times a day with meals      VITALS:  T(F): 99.1 (21 @ 13:01), Max: 99.1 (21 @ 13:01)  HR: 94 (21 @ 13:01)  BP: 149/69 (21 @ 13:01)  RR: 17 (21 @ 13:01)  SpO2: 100% (21 @ 13:01)  Wt(kg): --      PHYSICAL EXAM:  Constitutional: NAD  HEENT: anicteric sclera  Neck: No JVD  Respiratory: CTAB, no wheezes, rales or rhonchi  Cardiovascular: S1, S2, RRR  Gastrointestinal: BS+, soft, NT/ND  Extremities: No peripheral edema  Neurological: A/O x 3  Psychiatric: Normal mood, normal affect  : No recinos.   Vascular Access: HOMERO AVF+thrill    LABS:      134<L>  |  93<L>  |  46<H>  ----------------------------<  157<H>  4.4   |  25  |  8.38<H>    Ca    8.5      02 May 2021 07:50  Phos  5.4     05-  Mg     2.1     05-    TPro  7.0  /  Alb  3.8  /  TBili  0.4  /  DBili      /  AST  16  /  ALT  9   /  AlkPhos  79  04-30    Creatinine Trend: 8.38 <--, 6.29 <--, 5.03 <--                        7.8    7.83  )-----------( 79       ( 02 May 2021 07:50 )             25.1     Urine Studies:  Urinalysis Basic - ( 01 May 2021 16:23 )    Color: Yellow / Appearance: Slightly Turbid / S.016 / pH:   Gluc:  / Ketone: Negative  / Bili: Negative / Urobili: <2 mg/dL   Blood:  / Protein: 100 mg/dL / Nitrite: Negative   Leuk Esterase: Negative / RBC: 7 /HPF / WBC 3-6 /HPF   Sq Epi:  / Non Sq Epi: Many / Bacteria: Few          RADIOLOGY & ADDITIONAL STUDIES:

## 2021-05-02 NOTE — CONSULT NOTE ADULT - ASSESSMENT
80F with PMHx Rastafarian (will not take blood products), ESRD (MWF, L AVF), DM on insulin (only premeal novolog), HTN, moderate AS, ILR 9/2020 presenting with oral bleeding on day of admission, after biting inner oral mucosa now self-resolved.       #Diathesis - trauma induced  -likely related to uremia  -if she has further oral mucosa bleeding start Aminocaproic acid 8 grams oral swish and spit every 6 hours    #Thrombocytopenia/anemia  -check iron studies, b12, folate  -check spep, chino, flc to r/o myeloma  -agree to continue EPO with HD  -no transfusion products given religous beliefs  -would try to minimize blood draws and utilize pediatric size tubes with phlebotomies    Will follow with you.    Eder Becerra MD  Hematology/Oncology  Cell:  [phone]  Office Phone: [phone]  Office Fax:  [phone]  [facility] [place] [address]  80F with PMHx Zoroastrian (will not take blood products), ESRD (MWF, L AVF), DM on insulin (only premeal novolog), HTN, moderate AS, ILR 9/2020 presenting with oral bleeding on day of admission, after biting inner oral mucosa now self-resolved.       #Diathesis - trauma induced  -likely related to uremia  -if she has further oral mucosa bleeding start Aminocaproic acid 8 grams oral swish and spit every 6 hours    #Thrombocytopenia/anemia  -check iron studies, b12, folate  -check spep, chino, flc to r/o myeloma  -agree to continue EPO with HD  -no transfusion products given religous beliefs  -would try to minimize blood draws and utilize pediatric size tubes with phlebotomies      Will follow with you.    Eder Becerra MD  Hematology/Oncology  Cell:  [phone]  Office Phone: [phone]  Office Fax:  [phone]  [facility] [place] [address]

## 2021-05-03 LAB
ANION GAP SERPL CALC-SCNC: 22 MMOL/L — HIGH (ref 7–14)
BUN SERPL-MCNC: 67 MG/DL — HIGH (ref 7–23)
CALCIUM SERPL-MCNC: 8.4 MG/DL — SIGNIFICANT CHANGE UP (ref 8.4–10.5)
CHLORIDE SERPL-SCNC: 93 MMOL/L — LOW (ref 98–107)
CO2 SERPL-SCNC: 22 MMOL/L — SIGNIFICANT CHANGE UP (ref 22–31)
CREAT SERPL-MCNC: 10.87 MG/DL — HIGH (ref 0.5–1.3)
DIALYSIS INSTRUMENT RESULT - HEPATITIS B SURFACE ANTIGEN: NEGATIVE — SIGNIFICANT CHANGE UP
FERRITIN SERPL-MCNC: 1101 NG/ML — HIGH (ref 15–150)
FOLATE SERPL-MCNC: 8.9 NG/ML — SIGNIFICANT CHANGE UP (ref 3.1–17.5)
GLUCOSE BLDC GLUCOMTR-MCNC: 144 MG/DL — HIGH (ref 70–99)
GLUCOSE BLDC GLUCOMTR-MCNC: 158 MG/DL — HIGH (ref 70–99)
GLUCOSE BLDC GLUCOMTR-MCNC: 192 MG/DL — HIGH (ref 70–99)
GLUCOSE BLDC GLUCOMTR-MCNC: 221 MG/DL — HIGH (ref 70–99)
GLUCOSE SERPL-MCNC: 158 MG/DL — HIGH (ref 70–99)
HCT VFR BLD CALC: 24.3 % — LOW (ref 34.5–45)
HGB BLD-MCNC: 7.6 G/DL — LOW (ref 11.5–15.5)
IRON SATN MFR SERPL: 34 % — SIGNIFICANT CHANGE UP (ref 14–50)
IRON SATN MFR SERPL: 60 UG/DL — SIGNIFICANT CHANGE UP (ref 30–160)
MCHC RBC-ENTMCNC: 31.3 GM/DL — LOW (ref 32–36)
MCHC RBC-ENTMCNC: 32.2 PG — SIGNIFICANT CHANGE UP (ref 27–34)
MCV RBC AUTO: 103 FL — HIGH (ref 80–100)
NRBC # BLD: 0 /100 WBCS — SIGNIFICANT CHANGE UP
NRBC # FLD: 0 K/UL — SIGNIFICANT CHANGE UP
PHOSPHATE SERPL-MCNC: 6 MG/DL — HIGH (ref 2.5–4.5)
PLATELET # BLD AUTO: 97 K/UL — LOW (ref 150–400)
POTASSIUM SERPL-MCNC: 4.3 MMOL/L — SIGNIFICANT CHANGE UP (ref 3.5–5.3)
POTASSIUM SERPL-SCNC: 4.3 MMOL/L — SIGNIFICANT CHANGE UP (ref 3.5–5.3)
PROT SERPL-MCNC: 5.9 G/DL — LOW (ref 6–8.3)
RBC # BLD: 2.36 M/UL — LOW (ref 3.8–5.2)
RBC # FLD: 15.3 % — HIGH (ref 10.3–14.5)
SODIUM SERPL-SCNC: 137 MMOL/L — SIGNIFICANT CHANGE UP (ref 135–145)
TIBC SERPL-MCNC: 177 UG/DL — LOW (ref 220–430)
UIBC SERPL-MCNC: 117 UG/DL — SIGNIFICANT CHANGE UP (ref 110–370)
VIT B12 SERPL-MCNC: 663 PG/ML — SIGNIFICANT CHANGE UP (ref 200–900)
WBC # BLD: 7.68 K/UL — SIGNIFICANT CHANGE UP (ref 3.8–10.5)
WBC # FLD AUTO: 7.68 K/UL — SIGNIFICANT CHANGE UP (ref 3.8–10.5)

## 2021-05-03 PROCEDURE — 84165 PROTEIN E-PHORESIS SERUM: CPT | Mod: 26

## 2021-05-03 PROCEDURE — 86334 IMMUNOFIX E-PHORESIS SERUM: CPT | Mod: 26

## 2021-05-03 RX ORDER — CHLORHEXIDINE GLUCONATE 213 G/1000ML
1 SOLUTION TOPICAL DAILY
Refills: 0 | Status: DISCONTINUED | OUTPATIENT
Start: 2021-05-03 | End: 2021-05-04

## 2021-05-03 RX ADMIN — SEVELAMER CARBONATE 800 MILLIGRAM(S): 2400 POWDER, FOR SUSPENSION ORAL at 12:31

## 2021-05-03 RX ADMIN — SEVELAMER CARBONATE 800 MILLIGRAM(S): 2400 POWDER, FOR SUSPENSION ORAL at 20:22

## 2021-05-03 RX ADMIN — Medication 1: at 09:07

## 2021-05-03 RX ADMIN — ERYTHROPOIETIN 10000 UNIT(S): 10000 INJECTION, SOLUTION INTRAVENOUS; SUBCUTANEOUS at 17:32

## 2021-05-03 RX ADMIN — ATORVASTATIN CALCIUM 20 MILLIGRAM(S): 80 TABLET, FILM COATED ORAL at 22:29

## 2021-05-03 RX ADMIN — SEVELAMER CARBONATE 800 MILLIGRAM(S): 2400 POWDER, FOR SUSPENSION ORAL at 08:17

## 2021-05-03 RX ADMIN — Medication 25 MILLIGRAM(S): at 06:18

## 2021-05-03 RX ADMIN — Medication 1: at 12:31

## 2021-05-03 NOTE — PROGRESS NOTE ADULT - PROBLEM SELECTOR PLAN 2
Leukocytosis to 17. Unclear etiology at this time. Afebrile, denies respiratory, abdominal, urinary complaints. Does not appear to be dental infection either as denies purulent drainage from mouth and no tooth pain/swelling. Only area of swelling is area of likely prior cut/bite in left cheek  -f/u BCx x2  -f/u UA  -CXR clear  -trend WBC, fever curve  -s/p cefepime in ED  -would monitor off abx for now as patient appears well and denying complaints
Leukocytosis to 17. Unclear etiology at this time. Afebrile, denies respiratory, abdominal, urinary complaints. Does not appear to be dental infection either as denies purulent drainage from mouth and no tooth pain/swelling. Only area of swelling is area of likely prior cut/bite in left cheek  -f/u BCx x2  -f/u UA  -CXR clear  -trend WBC, fever curve  -s/p cefepime in ED  -would monitor off abx for now as patient appears well and denying complaints

## 2021-05-03 NOTE — PROGRESS NOTE ADULT - PROBLEM SELECTOR PLAN 10
SCDs, hold chemical DVT ppx given plt 50 and oral bleeding  Renal/consistent carb/DASH/TLC diet  PT eval
SCDs, hold chemical DVT ppx given plt 50 and oral bleeding  Renal/consistent carb/DASH/TLC diet  PT eval

## 2021-05-03 NOTE — PROGRESS NOTE ADULT - SUBJECTIVE AND OBJECTIVE BOX
PATIENT SEEN AND EXAMINED ON -2021  DATE OF SERVICE:   2021   Interim events noted,Labs ,Radiological studies and Cardiology tests reviewed .    SUBJECTIVE / OVERNIGHT EVENTS: pt seen and examined no further bleeding no dyspnea chest oain    MEDICATIONS  (STANDING):  atorvastatin 20 milliGRAM(s) Oral at bedtime  chlorhexidine 2% Cloths 1 Application(s) Topical daily  dextrose 40% Gel 15 Gram(s) Oral once  epoetin shay-epbx (RETACRIT) Injectable 07942 Unit(s) IV Push <User Schedule>  glucagon  Injectable 1 milliGRAM(s) IntraMuscular once  insulin lispro (ADMELOG) corrective regimen sliding scale   SubCutaneous three times a day before meals  insulin lispro (ADMELOG) corrective regimen sliding scale   SubCutaneous at bedtime  metoprolol succinate ER 25 milliGRAM(s) Oral daily  sevelamer carbonate 800 milliGRAM(s) Oral three times a day with meals    MEDICATIONS  (PRN):  acetaminophen   Tablet .. 650 milliGRAM(s) Oral every 6 hours PRN Mild Pain (1 - 3)  ALBUTerol    90 MICROgram(s) HFA Inhaler 2 Puff(s) Inhalation every 6 hours PRN Wheezing    Vital Signs Last 24 Hrs  T(C): 36.9 (03 May 2021 20:19), Max: 36.9 (03 May 2021 20:19)  T(F): 98.5 (03 May 2021 20:19), Max: 98.5 (03 May 2021 20:19)  HR: 98 (03 May 2021 20:19) (79 - 98)  BP: 141/77 (03 May 2021 20:19) (138/61 - 150/75)  RR: 18 (03 May 2021 20:19) (17 - 18)  SpO2: 100% (03 May 2021 20:19) (98% - 100%)    Constitutional: No fever, fatigue  Skin: No rash.  Eyes: No recent vision problems or eye pain.  ENT: No congestion, ear pain, or sore throat.  Cardiovascular: No chest pain or palpation.  Respiratory: No cough, shortness of breath, congestion, or wheezing.  Gastrointestinal: No abdominal pain, nausea, vomiting, or diarrhea.  Genitourinary: No dysuria.  Musculoskeletal: No joint swelling.  Neurologic: No headache.    PHYSICAL EXAM:  GENERAL: NAD  EYES: EOMI, PERRLA  NECK: Supple, No JVD  CHEST/LUNG: dec breath sounds rt base  HEART:  S1 , S2 +  ABDOMEN: soft , bs+  EXTREMITIES:  trace edema+  NEUROLOGY:alert awake    LABS:      137  |  93<L>  |  67<H>  ----------------------------<  158<H>  4.3   |  22  |  10.87<H>    Ca    8.4      03 May 2021 08:17  Phos  6.0         TPro  5.9<L>  /  Alb      /  TBili      /  DBili      /  AST      /  ALT      /  AlkPhos          Creatinine Trend: 10.87 <--, 8.38 <--, 6.29 <--, 5.03 <--                        7.6    7.68  )-----------( 97       ( 03 May 2021 08:17 )             24.3     Urine Studies:  Urinalysis Basic - ( 01 May 2021 16:23 )    Color: Yellow / Appearance: Slightly Turbid / S.016 / pH:   Gluc:  / Ketone: Negative  / Bili: Negative / Urobili: <2 mg/dL   Blood:  / Protein: 100 mg/dL / Nitrite: Negative   Leuk Esterase: Negative / RBC: 7 /HPF / WBC 3-6 /HPF   Sq Epi:  / Non Sq Epi: Many / Bacteria: Few              LIVER FUNCTIONS - ( 03 May 2021 08:17 )  Alb: x     / Pro: 5.9 g/dL / ALK PHOS: x     / ALT: x     / AST: x     / GGT: x               Urinalysis Basic - ( 01 May 2021 16:23 )    Color: Yellow / Appearance: Slightly Turbid / S.016 / pH: x  Gluc: x / Ketone: Negative  / Bili: Negative / Urobili: <2 mg/dL   Blood: x / Protein: 100 mg/dL / Nitrite: Negative   Leuk Esterase: Negative / RBC: 7 /HPF / WBC 3-6 /HPF   Sq Epi: x / Non Sq Epi: Many / Bacteria: Few          Assessment and Plan:   · Assessment	  80F with PMHx Congregation (will not take blood products), ESRD (MWF, L AVF), DM on insulin (only premeal novolog), HTN, moderate AS, ILR 2020 presenting with oral bleeding today now self-resolved. Found to have leukocytosis    Problem/Plan - 1:  ·  Problem: Oral bleeding.  Plan: Left inner cheek bleeding now resolved most likely due to cheek biting. Patient has had multiple instances of tongue/cheek biting in the last few weeks and takes ASA. Had prior teeth extractions and awaiting dentures  -would speak to dentistry outpatient regarding patient's dentures and if further extractions are planned  -hold ASA  -continue to monitor for signs of bleeding  -denies hemoptysis, hematemesis  -monitor thrombocytopenia.     Problem/Plan - 2:  ·  Problem: Leukocytosis, unspecified type.  Plan: Leukocytosis to 17. Unclear etiology at this time. Afebrile, denies respiratory, abdominal, urinary complaints. Does not appear to be dental infection either as denies purulent drainage from mouth and no tooth pain/swelling. Only area of swelling is area of likely prior cut/bite in left cheek  -f/u BCx x2  -f/u UA  -CXR clear  -trend WBC, fever curve  -s/p cefepime in ED  -would monitor off abx for now as patient appears well and denying complaints.     Problem/Plan - 3:  ·  Problem: Thrombocytopenia.  Plan: Plt 50 and prior has been 120s. May be multifactorial - ASA use, heparin with HD?, Aortic stenosis, ESRD.   -hold ASA  -monitor plt and if continues to be lower than baseline and recurrent bleeding would have hematology evaluation.     Problem/Plan - 4:  ·  Problem: ESRD (end stage renal disease) on dialysis.  Plan: Nephrology consult in [facility].     Problem/Plan - 5:  ·  Problem: Type 2 diabetes mellitus with hyperglycemia, with long-term current use of insulin.  Plan: States she takes novolog 20u premeal at home. Last A1C 5.4 in September and at that time was discharged off insulin. Will only give ISS at this time as patient notes hypoglycemic events with her insulin. Likely should discontinue insulin on discharge or decrease dosing.     Problem/Plan - 6:  Problem: Aortic valve stenosis, etiology of cardiac valve disease unspecified. Plan: Resume home BB. Med rec for other medications.    Problem/Plan - 7:  ·  Problem: Hypertension, unspecified type.  Plan: Resume BB. Med rec.     Problem/Plan - 8:  ·  Problem: Hyperlipidemia, unspecified hyperlipidemia type.  Plan: Resume statin.     Problem/Plan - 9:  ·  Problem: Medication management.  Plan: Patient does not know all of her medications and unable to reach daughter for full med rec. Please perform med rec in AM.     Problem/Plan - 10:  Problem: Need for prophylactic measure. Plan; SCDs, hold chemical DVT ppx given plt 50 and oral bleeding  Renal/consistent carb/DASH/TLC diet  PT eval.

## 2021-05-03 NOTE — PROGRESS NOTE ADULT - PROBLEM SELECTOR PLAN 5
States she takes novolog 20u premeal at home. Last A1C 5.4 in September and at that time was discharged off insulin. Will only give ISS at this time as patient notes hypoglycemic events with her insulin. Likely should discontinue insulin on discharge or decrease dosing
States she takes novolog 20u premeal at home. Last A1C 5.4 in September and at that time was discharged off insulin. Will only give ISS at this time as patient notes hypoglycemic events with her insulin. Likely should discontinue insulin on discharge or decrease dosing

## 2021-05-03 NOTE — PROGRESS NOTE ADULT - PROBLEM SELECTOR PROBLEM 5
Type 2 diabetes mellitus with hyperglycemia, with long-term current use of insulin
Type 2 diabetes mellitus with hyperglycemia, with long-term current use of insulin
70 yo/female with PMHx stage IV lung ca w/bone mets, malignant pleural effusions, asthma, HTN, HLD, admitted with acute hypoxic respiratory failure 2/2 recurrent malignant pleural effusion 2/2 cancer. S/p chest tube placement. Initially on 7 [place], but stepped up to 7 [place] for closer monitoring on BiPAP (though pt is DNI, and not DNR). Pt seen in room this AM, awake, alert, pleasant, breathing on HFNC. She declined Ivorian  at this time. She reported having a fair appetite recently, mainly eating soups, vegetables, and coffee. She denied food allergies, and also was unsure if she's had Ensure/ONS in the past. She was on CLD this AM but has since been advanced to regular diet. She reported that her daughter would be bringing some food for her. She denied complaints of N/V/C/D or pain. No difficulty chewing or swallowing reported. Skin intact, no edema, chest tube remains in place. Pending possible tx to [facility] for chemo. She was unsure of recent weight loss, but NFPE significant for mild protein-calorie malnutrition. Encouraged PO intake w/focus on small, frequent meals, and lean protein options. Will continue to follow per RD protocol.

## 2021-05-03 NOTE — PROGRESS NOTE ADULT - PROBLEM SELECTOR PLAN 9
Patient does not know all of her medications and unable to reach daughter for full med rec. Please perform med rec in AM
Patient does not know all of her medications and unable to reach daughter for full med rec. Please perform med rec in AM

## 2021-05-03 NOTE — PROGRESS NOTE ADULT - SUBJECTIVE AND OBJECTIVE BOX
SUBJECTIVE / OVERNIGHT EVENTS: pt seen and examined    MEDICATIONS  (STANDING):  atorvastatin 20 milliGRAM(s) Oral at bedtime  chlorhexidine 2% Cloths 1 Application(s) Topical daily  dextrose 40% Gel 15 Gram(s) Oral once  dextrose 5%. 1000 milliLiter(s) (50 mL/Hr) IV Continuous <Continuous>  dextrose 5%. 1000 milliLiter(s) (100 mL/Hr) IV Continuous <Continuous>  dextrose 50% Injectable 25 Gram(s) IV Push once  dextrose 50% Injectable 12.5 Gram(s) IV Push once  dextrose 50% Injectable 25 Gram(s) IV Push once  epoetin shay-epbx (RETACRIT) Injectable 34053 Unit(s) IV Push <User Schedule>  glucagon  Injectable 1 milliGRAM(s) IntraMuscular once  insulin lispro (ADMELOG) corrective regimen sliding scale   SubCutaneous three times a day before meals  insulin lispro (ADMELOG) corrective regimen sliding scale   SubCutaneous at bedtime  metoprolol succinate ER 25 milliGRAM(s) Oral daily  sevelamer carbonate 800 milliGRAM(s) Oral three times a day with meals    MEDICATIONS  (PRN):  acetaminophen   Tablet .. 650 milliGRAM(s) Oral every 6 hours PRN Mild Pain (1 - 3)  ALBUTerol    90 MICROgram(s) HFA Inhaler 2 Puff(s) Inhalation every 6 hours PRN Wheezing    Vital Signs Last 24 Hrs  T(C): 36.9 (03 May 2021 20:19), Max: 36.9 (03 May 2021 20:19)  T(F): 98.5 (03 May 2021 20:19), Max: 98.5 (03 May 2021 20:19)  HR: 98 (03 May 2021 20:19) (79 - 98)  BP: 141/77 (03 May 2021 20:19) (138/61 - 150/75)  BP(mean): --  RR: 18 (03 May 2021 20:19) (17 - 18)  SpO2: 100% (03 May 2021 20:19) (98% - 100%)    Constitutional: No fever, fatigue  Skin: No rash.  Eyes: No recent vision problems or eye pain.  ENT: No congestion, ear pain, or sore throat.  Cardiovascular: No chest pain or palpation.  Respiratory: No cough, shortness of breath, congestion, or wheezing.  Gastrointestinal: No abdominal pain, nausea, vomiting, or diarrhea.  Genitourinary: No dysuria.  Musculoskeletal: No joint swelling.  Neurologic: No headache.    PHYSICAL EXAM:  GENERAL: NAD  EYES: EOMI, PERRLA  NECK: Supple, No JVD  CHEST/LUNG: dec breath sounds rt base  HEART:  S1 , S2 +  ABDOMEN: soft , bs+  EXTREMITIES:  trace edema+  NEUROLOGY:alert awake    LABS:      137  |  93<L>  |  67<H>  ----------------------------<  158<H>  4.3   |  22  |  10.87<H>    Ca    8.4      03 May 2021 08:17  Phos  6.0         TPro  5.9<L>  /  Alb      /  TBili      /  DBili      /  AST      /  ALT      /  AlkPhos          Creatinine Trend: 10.87 <--, 8.38 <--, 6.29 <--, 5.03 <--                        7.6    7.68  )-----------( 97       ( 03 May 2021 08:17 )             24.3     Urine Studies:  Urinalysis Basic - ( 01 May 2021 16:23 )    Color: Yellow / Appearance: Slightly Turbid / S.016 / pH:   Gluc:  / Ketone: Negative  / Bili: Negative / Urobili: <2 mg/dL   Blood:  / Protein: 100 mg/dL / Nitrite: Negative   Leuk Esterase: Negative / RBC: 7 /HPF / WBC 3-6 /HPF   Sq Epi:  / Non Sq Epi: Many / Bacteria: Few              LIVER FUNCTIONS - ( 03 May 2021 08:17 )  Alb: x     / Pro: 5.9 g/dL / ALK PHOS: x     / ALT: x     / AST: x     / GGT: x               Urinalysis Basic - ( 01 May 2021 16:23 )    Color: Yellow / Appearance: Slightly Turbid / S.016 / pH: x  Gluc: x / Ketone: Negative  / Bili: Negative / Urobili: <2 mg/dL   Blood: x / Protein: 100 mg/dL / Nitrite: Negative   Leuk Esterase: Negative / RBC: 7 /HPF / WBC 3-6 /HPF   Sq Epi: x / Non Sq Epi: Many / Bacteria: Few        RADIOLOGY & ADDITIONAL TESTS:    Imaging Personally Reviewed:    Consultant(s) Notes Reviewed:      Care Discussed with Consultants/Other Providers:

## 2021-05-03 NOTE — PROGRESS NOTE ADULT - PROBLEM SELECTOR PLAN 3
Plt 50 and prior has been 120s. May be multifactorial - ASA use, heparin with HD?, Aortic stenosis, ESRD.   -hold ASA  -monitor plt and if continues to be lower than baseline and recurrent bleeding would have hematology evaluation
Plt 50 and prior has been 120s. May be multifactorial - ASA use, heparin with HD?, Aortic stenosis, ESRD.   -hold ASA  -monitor plt and if continues to be lower than baseline and recurrent bleeding would have hematology evaluation

## 2021-05-03 NOTE — PROGRESS NOTE ADULT - PROBLEM SELECTOR PLAN 1
Left inner cheek bleeding now resolved most likely due to cheek biting. Patient has had multiple instances of tongue/cheek biting in the last few weeks and takes ASA. Had prior teeth extractions and awaiting dentures  -would speak to dentistry outpatient regarding patient's dentures and if further extractions are planned  -hold ASA  -continue to monitor for signs of bleeding  -denies hemoptysis, hematemesis  -monitor thrombocytopenia
Left inner cheek bleeding now resolved most likely due to cheek biting. Patient has had multiple instances of tongue/cheek biting in the last few weeks and takes ASA. Had prior teeth extractions and awaiting dentures  -would speak to dentistry outpatient regarding patient's dentures and if further extractions are planned  -hold ASA  -continue to monitor for signs of bleeding  -denies hemoptysis, hematemesis  -monitor thrombocytopenia

## 2021-05-03 NOTE — PROGRESS NOTE ADULT - SUBJECTIVE AND OBJECTIVE BOX
[facility] - S Suzi / Hieu S /D Daniel/ ZAIRA Melton/ S Piedad/ Aydin Greene / AKIKO Baldwinu/ O Yumiko  service -[phone], office [phone]  ---------------------------------------------------------------------------------------------------------------    Patient seen and examined bedside    Subjective and Objective: No overnight events, sob resolved. No complaints today. feeling better    Allergies: No Known Allergies      Hospital Medications:   MEDICATIONS  (STANDING):  atorvastatin 20 milliGRAM(s) Oral at bedtime  dextrose 40% Gel 15 Gram(s) Oral once  dextrose 5%. 1000 milliLiter(s) (50 mL/Hr) IV Continuous <Continuous>  dextrose 5%. 1000 milliLiter(s) (100 mL/Hr) IV Continuous <Continuous>  dextrose 50% Injectable 25 Gram(s) IV Push once  dextrose 50% Injectable 12.5 Gram(s) IV Push once  dextrose 50% Injectable 25 Gram(s) IV Push once  epoetin shay-epbx (RETACRIT) Injectable 25749 Unit(s) IV Push <User Schedule>  glucagon  Injectable 1 milliGRAM(s) IntraMuscular once  insulin lispro (ADMELOG) corrective regimen sliding scale   SubCutaneous three times a day before meals  insulin lispro (ADMELOG) corrective regimen sliding scale   SubCutaneous at bedtime  metoprolol succinate ER 25 milliGRAM(s) Oral daily  sevelamer carbonate 800 milliGRAM(s) Oral three times a day with meals      REVIEW OF SYSTEMS:  CONSTITUTIONAL: No weakness, fevers or chills  EYES/ENT: No visual changes;  No vertigo or throat pain   NECK: No pain or stiffness  RESPIRATORY: No cough, wheezing, hemoptysis; No shortness of breath  CARDIOVASCULAR: No chest pain or palpitations.  GASTROINTESTINAL: No abdominal or epigastric pain. No nausea, vomiting, or hematemesis; No diarrhea or constipation. No melena or hematochezia.  GENITOURINARY: No dysuria, frequency, foamy urine, urinary urgency, incontinence or hematuria  NEUROLOGICAL: No numbness or weakness  SKIN: No itching, burning, rashes, or lesions   VASCULAR: No bilateral lower extremity edema.   All other review of systems is negative unless indicated above.    VITALS:  T(F): 97.7 (21 @ 12:30), Max: 97.8 (21 @ 21:05)  HR: 79 (21 @ 12:30)  BP: 145/78 (21 @ 12:30)  RR: 18 (21 @ 12:30)  SpO2: 100% (21 @ 12:30)  Wt(kg): --        PHYSICAL EXAM:  Constitutional: NAD  HEENT: anicteric sclera, oropharynx clear  Neck: No JVD  Respiratory: CTAB, no wheezes, rales or rhonchi  Cardiovascular: S1, S2, RRR  Gastrointestinal: BS+, soft, NT/ND  Extremities: No cyanosis or clubbing. No peripheral edema  Neurological: A/O x 3, no focal deficits  Psychiatric: Normal mood, normal affect  : No CVA tenderness. No recinos.   Skin: No rashes  Vascular Access:    LABS:      137  |  93<L>  |  67<H>  ----------------------------<  158<H>  4.3   |  22  |  10.87<H>    Ca    8.4      03 May 2021 08:17  Phos  6.0         TPro  5.9<L>  /  Alb      /  TBili      /  DBili      /  AST      /  ALT      /  AlkPhos          Creatinine Trend: 10.87 <--, 8.38 <--, 6.29 <--, 5.03 <--                        7.6    7.68  )-----------( 97       ( 03 May 2021 08:17 )             24.3     Urine Studies:  Urinalysis Basic - ( 01 May 2021 16:23 )    Color: Yellow / Appearance: Slightly Turbid / S.016 / pH:   Gluc:  / Ketone: Negative  / Bili: Negative / Urobili: <2 mg/dL   Blood:  / Protein: 100 mg/dL / Nitrite: Negative   Leuk Esterase: Negative / RBC: 7 /HPF / WBC 3-6 /HPF   Sq Epi:  / Non Sq Epi: Many / Bacteria: Few          RADIOLOGY & ADDITIONAL STUDIES:   [facility] - S Suzi / Hieu S /D Daniel/ S Linh/ S Piedad/ Aydin Greene / AKIKO Baldwinu/ O Yumiko  service -[phone], office [phone]  ---------------------------------------------------------------------------------------------------------------    Patient seen and examined bedside    Subjective and Objective: No overnight events, denied sob. No complaints today.     Allergies: No Known Allergies      Hospital Medications:   MEDICATIONS  (STANDING):  atorvastatin 20 milliGRAM(s) Oral at bedtime  dextrose 40% Gel 15 Gram(s) Oral once  dextrose 5%. 1000 milliLiter(s) (50 mL/Hr) IV Continuous <Continuous>  dextrose 5%. 1000 milliLiter(s) (100 mL/Hr) IV Continuous <Continuous>  dextrose 50% Injectable 25 Gram(s) IV Push once  dextrose 50% Injectable 12.5 Gram(s) IV Push once  dextrose 50% Injectable 25 Gram(s) IV Push once  epoetin shay-epbx (RETACRIT) Injectable 13332 Unit(s) IV Push <User Schedule>  glucagon  Injectable 1 milliGRAM(s) IntraMuscular once  insulin lispro (ADMELOG) corrective regimen sliding scale   SubCutaneous three times a day before meals  insulin lispro (ADMELOG) corrective regimen sliding scale   SubCutaneous at bedtime  metoprolol succinate ER 25 milliGRAM(s) Oral daily  sevelamer carbonate 800 milliGRAM(s) Oral three times a day with meals    VITALS:  T(F): 97.7 (21 @ 12:30), Max: 97.8 (21 @ 21:05)  HR: 79 (21 @ 12:30)  BP: 145/78 (21 @ 12:30)  RR: 18 (21 @ 12:30)  SpO2: 100% (21 @ 12:30)  Wt(kg): --      PHYSICAL EXAM:  Constitutional: NAD  HEENT: anicteric sclera  Neck: No JVD  Respiratory: CTAB, no wheezes, rales or rhonchi  Cardiovascular: S1, S2, RRR  Gastrointestinal: BS+, soft, NT/ND  Extremities: No peripheral edema  Neurological: A/O x 3  Psychiatric: Normal mood, normal affect  : No recinos.   Vascular Access: HOMERO AVF+thrill    LABS:      137  |  93<L>  |  67<H>  ----------------------------<  158<H>  4.3   |  22  |  10.87<H>    Ca    8.4      03 May 2021 08:17  Phos  6.0         TPro  5.9<L>  /  Alb      /  TBili      /  DBili      /  AST      /  ALT      /  AlkPhos          Creatinine Trend: 10.87 <--, 8.38 <--, 6.29 <--, 5.03 <--                        7.6    7.68  )-----------( 97       ( 03 May 2021 08:17 )             24.3     Urine Studies:  Urinalysis Basic - ( 01 May 2021 16:23 )    Color: Yellow / Appearance: Slightly Turbid / S.016 / pH:   Gluc:  / Ketone: Negative  / Bili: Negative / Urobili: <2 mg/dL   Blood:  / Protein: 100 mg/dL / Nitrite: Negative   Leuk Esterase: Negative / RBC: 7 /HPF / WBC 3-6 /HPF   Sq Epi:  / Non Sq Epi: Many / Bacteria: Few          RADIOLOGY & ADDITIONAL STUDIES:

## 2021-05-04 ENCOUNTER — TRANSCRIPTION ENCOUNTER (OUTPATIENT)
Age: 81
End: 2021-05-04

## 2021-05-04 VITALS
HEART RATE: 90 BPM | OXYGEN SATURATION: 100 % | RESPIRATION RATE: 18 BRPM | TEMPERATURE: 98 F | SYSTOLIC BLOOD PRESSURE: 136 MMHG | DIASTOLIC BLOOD PRESSURE: 63 MMHG

## 2021-05-04 LAB
GLUCOSE BLDC GLUCOMTR-MCNC: 112 MG/DL — HIGH (ref 70–99)
GLUCOSE BLDC GLUCOMTR-MCNC: 151 MG/DL — HIGH (ref 70–99)
GLUCOSE BLDC GLUCOMTR-MCNC: 157 MG/DL — HIGH (ref 70–99)
KAPPA LC SER QL IFE: 9.97 MG/DL — HIGH (ref 0.33–1.94)
KAPPA/LAMBDA FREE LIGHT CHAIN RATIO, SERUM: 1.35 RATIO — SIGNIFICANT CHANGE UP (ref 0.26–1.65)
LAMBDA LC SER QL IFE: 7.37 MG/DL — HIGH (ref 0.57–2.63)
MRSA PCR RESULT.: SIGNIFICANT CHANGE UP
S AUREUS DNA NOSE QL NAA+PROBE: SIGNIFICANT CHANGE UP
SARS-COV-2 RNA SPEC QL NAA+PROBE: SIGNIFICANT CHANGE UP

## 2021-05-04 RX ORDER — INSULIN ASPART 100 [IU]/ML
20 INJECTION, SOLUTION SUBCUTANEOUS
Qty: 0 | Refills: 0 | DISCHARGE

## 2021-05-04 RX ORDER — METOPROLOL TARTRATE 50 MG
1 TABLET ORAL
Qty: 0 | Refills: 0 | DISCHARGE
Start: 2021-05-04

## 2021-05-04 RX ADMIN — SEVELAMER CARBONATE 800 MILLIGRAM(S): 2400 POWDER, FOR SUSPENSION ORAL at 08:16

## 2021-05-04 RX ADMIN — SEVELAMER CARBONATE 800 MILLIGRAM(S): 2400 POWDER, FOR SUSPENSION ORAL at 12:25

## 2021-05-04 RX ADMIN — Medication 1: at 12:24

## 2021-05-04 RX ADMIN — Medication 1: at 08:16

## 2021-05-04 RX ADMIN — Medication 25 MILLIGRAM(S): at 05:51

## 2021-05-04 RX ADMIN — CHLORHEXIDINE GLUCONATE 1 APPLICATION(S): 213 SOLUTION TOPICAL at 12:30

## 2021-05-04 NOTE — DISCHARGE NOTE PROVIDER - HOSPITAL COURSE
80F with PMHx Sikhism (will not take blood products), ESRD (MWF, L AVF), DM on insulin (only premeal novolog), HTN, moderate AS, ILR 9/2020 presenting with oral bleeding today now self-resolved. Found to have leukocytosis    Oral bleeding- Left inner cheek bleeding now resolved most likely due to cheek biting  - Had prior teeth extractions and awaiting dentures, would speak to dentistry outpatient regarding patient's dentures   - hold ASA, continue to monitor for signs of bleeding  - monitor thrombocytopenia  - per Heme Dr Caldera- -if she has further oral mucosa bleeding start Aminocaproic acid 8 grams oral swish and spit every 6 hours    Leukocytosis- Leukocytosis to 17. Unclear etiology at this time. Afebrile. Only area of swelling is area of likely prior cut/bite in left cheek  - BCx x2- NTD, UA- neg, CXR clear  - trend WBC, fever curve  - s/p Cefepime in ED, monitor off abx for now as patient appears     Thrombocytopenia- Plt 50 and prior has been 120s. May be multifactorial - ASA use, heparin with HD?  - Heme consulted -hold ASA, continue EPO with HD, Vit b12 folate normal , elevated ferritin   - f/u spep, chino, flc to r/o myeloma  - no transfusion products given religous beliefs  - plt improved to 97K, according to chart base line at 120K   - follow up outpatient with Dr Becerra     ESRD on dialysis- Nephrology consult- cont on HD M-W-F  - Sevelamer     Type 2 diabetes mellitus with hyperglycemia, with long-term current use of insulin- HgA1C -7.2%, pt takes novolog 20u premeal at home  - Last A1C 5.4 in September and at that time was discharged off insulin. Will only give ISS at this time as patient notes hypoglycemic events with her insulin. Likely should discontinue insulin on discharge or decrease dosing.     Aortic valve stenosis- Resume home BB. Med rec for other medications.    Hypertension / HLD- Resume BB. Resume statin    Need for prophylactic measure  SCDs, hold chemical DVT ppx

## 2021-05-04 NOTE — PROGRESS NOTE ADULT - PROVIDER SPECIALTY LIST ADULT
Cardiology
Internal Medicine
Nephrology
Nephrology
Cardiology
Cardiology
Heme/Onc
Nephrology
Internal Medicine
Internal Medicine

## 2021-05-04 NOTE — DISCHARGE NOTE PROVIDER - PROVIDER TOKENS
PROVIDER:[TOKEN:[[id number]]],PROVIDER:[TOKEN:[[id number]]],PROVIDER:[TOKEN:[[id number]]]

## 2021-05-04 NOTE — PROGRESS NOTE ADULT - SUBJECTIVE AND OBJECTIVE BOX
Nephrology Followup Note - [phone] - Dr Hagen / Dr Archer / Dr Herbert / Dr Sanchez / Dr Melton / Dr Calderon / Dr Greene / Dr Villalba  Pt seen and examined at bedside  No acute events overnight. No complaints. Sitting up in chair.    Allergies:  No Known Allergies    Hospital Medications:   MEDICATIONS  (STANDING):  atorvastatin 20 milliGRAM(s) Oral at bedtime  chlorhexidine 2% Cloths 1 Application(s) Topical daily  dextrose 40% Gel 15 Gram(s) Oral once  dextrose 5%. 1000 milliLiter(s) (50 mL/Hr) IV Continuous <Continuous>  dextrose 5%. 1000 milliLiter(s) (100 mL/Hr) IV Continuous <Continuous>  dextrose 50% Injectable 25 Gram(s) IV Push once  dextrose 50% Injectable 12.5 Gram(s) IV Push once  dextrose 50% Injectable 25 Gram(s) IV Push once  epoetin shay-epbx (RETACRIT) Injectable 45649 Unit(s) IV Push <User Schedule>  glucagon  Injectable 1 milliGRAM(s) IntraMuscular once  insulin lispro (ADMELOG) corrective regimen sliding scale   SubCutaneous three times a day before meals  insulin lispro (ADMELOG) corrective regimen sliding scale   SubCutaneous at bedtime  metoprolol succinate ER 25 milliGRAM(s) Oral daily  sevelamer carbonate 800 milliGRAM(s) Oral three times a day with meals    VITALS:  T(F): 98.2 (21 @ 12:27), Max: 98.9 (21 @ 22:27)  HR: 90 (21 @ 12:27)  BP: 136/63 (21 @ 12:27)  RR: 18 (21 @ 12:27)  SpO2: 100% (21 @ 12:27)  Wt(kg): --     @ 07:01  -   @ 07:00  --------------------------------------------------------  IN: 400 mL / OUT: 1900 mL / NET: -1500 mL        PHYSICAL EXAM:  Constitutional: NAD  HEENT: anicteric sclera, oropharynx clear, MMM  Neck: No JVD  Respiratory: CTAB, no wheezes, rales or rhonchi  Cardiovascular: S1, S2, RRR  Gastrointestinal: BS+, soft, NT/ND  Extremities: No cyanosis or clubbing. No peripheral edema  Neurological: A/O x 3, no focal deficits  Psychiatric: Normal mood, normal affect  : No CVA tenderness. No recinos.   Skin: No rashes  Vascular Access: UE AV access +thrill and bruit.     LABS:      137  |  93<L>  |  67<H>  ----------------------------<  158<H>  4.3   |  22  |  10.87<H>    Ca    8.4      03 May 2021 08:17  Phos  6.0         TPro  5.9<L>  /  Alb      /  TBili      /  DBili      /  AST      /  ALT      /  AlkPhos          Creatinine Trend: 10.87 <--, 8.38 <--, 6.29 <--, 5.03 <--                        7.6    7.68  )-----------( 97       ( 03 May 2021 08:17 )             24.3     Urine Studies:  Urinalysis Basic - ( 01 May 2021 16:23 )    Color: Yellow / Appearance: Slightly Turbid / S.016 / pH:   Gluc:  / Ketone: Negative  / Bili: Negative / Urobili: <2 mg/dL   Blood:  / Protein: 100 mg/dL / Nitrite: Negative   Leuk Esterase: Negative / RBC: 7 /HPF / WBC 3-6 /HPF   Sq Epi:  / Non Sq Epi: Many / Bacteria: Few        RADIOLOGY & ADDITIONAL STUDIES:

## 2021-05-04 NOTE — PROGRESS NOTE ADULT - SUBJECTIVE AND OBJECTIVE BOX
Patient seen and examined at bedside. feels well     MEDICATIONS  (STANDING):  atorvastatin 20 milliGRAM(s) Oral at bedtime  chlorhexidine 2% Cloths 1 Application(s) Topical daily  dextrose 40% Gel 15 Gram(s) Oral once  dextrose 5%. 1000 milliLiter(s) (50 mL/Hr) IV Continuous <Continuous>  dextrose 5%. 1000 milliLiter(s) (100 mL/Hr) IV Continuous <Continuous>  dextrose 50% Injectable 25 Gram(s) IV Push once  dextrose 50% Injectable 12.5 Gram(s) IV Push once  dextrose 50% Injectable 25 Gram(s) IV Push once  epoetin shay-epbx (RETACRIT) Injectable 99777 Unit(s) IV Push <User Schedule>  glucagon  Injectable 1 milliGRAM(s) IntraMuscular once  insulin lispro (ADMELOG) corrective regimen sliding scale   SubCutaneous three times a day before meals  insulin lispro (ADMELOG) corrective regimen sliding scale   SubCutaneous at bedtime  metoprolol succinate ER 25 milliGRAM(s) Oral daily  sevelamer carbonate 800 milliGRAM(s) Oral three times a day with meals    MEDICATIONS  (PRN):  acetaminophen   Tablet .. 650 milliGRAM(s) Oral every 6 hours PRN Mild Pain (1 - 3)  ALBUTerol    90 MICROgram(s) HFA Inhaler 2 Puff(s) Inhalation every 6 hours PRN Wheezing        Vital Signs Last 24 Hrs  T(C): 36.9 (04 May 2021 05:49), Max: 37.2 (03 May 2021 22:27)  T(F): 98.5 (04 May 2021 05:49), Max: 98.9 (03 May 2021 22:27)  HR: 85 (04 May 2021 05:49) (79 - 98)  BP: 118/50 (04 May 2021 05:49) (118/50 - 150/75)  BP(mean): --  RR: 18 (04 May 2021 05:49) (18 - 18)  SpO2: 98% (04 May 2021 05:49) (98% - 100%)      PHYSICAL EXAM:     GENERAL:  Appears stated age, well-groomed  HEENT:  NC/AT,    CHEST:  CTA b/l  HEART:  S1 s2+   ABDOMEN:  Soft, non-tender, non-distended  EXTEREMITIES:  no cyanosis,clubbing or edema  NEURO:  Alert, oriented, no asterixis                            7.6    7.68  )-----------( 97       ( 03 May 2021 08:17 )             24.3       05-03    137  |  93<L>  |  67<H>  ----------------------------<  158<H>  4.3   |  22  |  10.87<H>    Ca    8.4      03 May 2021 08:17  Phos  6.0     05-03    TPro  5.9<L>  /  Alb  x   /  TBili  x   /  DBili  x   /  AST  x   /  ALT  x   /  AlkPhos  x   05-03

## 2021-05-04 NOTE — PROGRESS NOTE ADULT - REASON FOR ADMISSION
mouth bleeding

## 2021-05-04 NOTE — PROGRESS NOTE ADULT - TIME BILLING
- Review of records, telemetry, vital signs and daily labs.   - General and cardiovascular physical examination.  - Generation of cardiovascular treatment plan.  - Coordination of care.    Patient was seen and examined by me on 05/03/2021,interim events noted,labs and radiology studies reviewed.  Amol Joshi MD,FACC.  [address] [place], [place] [id number].  [phone]
- Review of records, telemetry, vital signs and daily labs.   - General and cardiovascular physical examination.  - Generation of cardiovascular treatment plan.  - Coordination of care.    Patient was seen and examined by me on 05/03/2021,interim events noted,labs and radiology studies reviewed.  Amol Joshi MD,FACC.  [address] [place], [place] [id number].  [phone]
- Review of records, telemetry, vital signs and daily labs.   - General and cardiovascular physical examination.  - Generation of cardiovascular treatment plan.  - Coordination of care.    Patient was seen and examined by me on 05/04/2021,interim events noted,labs and radiology studies reviewed.  Amol Joshi MD,FACC.  [address] [place], [place] [id number].  [phone]

## 2021-05-04 NOTE — PROGRESS NOTE ADULT - SUBJECTIVE AND OBJECTIVE BOX
DATE OF SERVICE:05/04/2021  Patient was seen and examined ,interim events noted.Consultant notes ,Labs,Telemetry reviewed by me    PRESENTING CC:    HPI and HOSPITAL COURSE: HPI:  80F with PMHx Religious (will not take blood products), ESRD (MWF, L AVF), DM on insulin (only premeal novolog), HTN, moderate AS, ILR 9/2020 presenting with oral bleeding Patient denies any hemoptysis, hematemesis, hematochezia or melena, LH/dizziness, LOC, CP, SOB, cough, fevers, chills, diarrhea, oral pain.    In ED, leukocytosis to 17 and given cefepime (01 May 2021 04:04)      INTERIM EVENTS:Awake alert OOB no bleeding noted      PMH -reviewed admission note, no change since admission  Heart Failure: Acute [ ] Chronic [ ] Acute on Chronic [ ] Diastolic [ ] Systolic [ ] Combined Systolic and Diastolic[ ]  KIANNA[ ]  ATN[ ]  CKD I [ ] CKDII [ ] CKD III [ ] CKD IV [ ] CKD V [ ] ESRD[ ]  HTN[ ] CVA[ ] DM[ ] COPD[ ] COVID[ ] AF[ ]  PPM[ ] ICD[ ]    MEDICATIONS  (STANDING):  atorvastatin 20 milliGRAM(s) Oral at bedtime  chlorhexidine 2% Cloths 1 Application(s) Topical daily  dextrose 40% Gel 15 Gram(s) Oral once  epoetin shay-epbx (RETACRIT) Injectable 06648 Unit(s) IV Push <User Schedule>  glucagon  Injectable 1 milliGRAM(s) IntraMuscular once  insulin lispro (ADMELOG) corrective regimen sliding scale   SubCutaneous three times a day before meals  insulin lispro (ADMELOG) corrective regimen sliding scale   SubCutaneous at bedtime  metoprolol succinate ER 25 milliGRAM(s) Oral daily  sevelamer carbonate 800 milliGRAM(s) Oral three times a day with meals      REVIEW OF SYSTEMS:  Constitutional: [ ] fever, [ ]weight loss,  [x ]fatigue  Eyes: [ ] visual changes  Respiratory: [ ]shortness of breath;  [ ] cough, [ ]wheezing, [ ]chills, [ ]hemoptysis  Cardiovascular: [ ] chest pain, [ ]palpitations, [ ]dizziness,  [ ]leg swelling[ ]orthopnea[ ]PND  Gastrointestinal: [ ] abdominal pain, [ ]nausea, [ ]vomiting,  [ ]diarrhea [ ]Constipation [ ]Melena  Genitourinary: [ ] dysuria, [ ] hematuria [ ]Pineda  Neurologic: [ ] headaches [ ] tremors[ ]weakness [ ]Paralysis Right[ ] Left[ ]  Skin: [ ] itching, [ ]burning, [ ] rashes  Endocrine: [ ] heat or cold intolerance  Musculoskeletal: [ ] joint pain or swelling; [ ] muscle, back, or extremity pain  Psychiatric: [ ] depression, [ ]anxiety, [ ]mood swings, or [ ]difficulty sleeping  Hematologic: [ ] easy bruising, [ ] bleeding gums    [x] All remaining systems negative except as per above.   [ ]Unable to obtain.    Vital Signs Last 24 Hrs  T(C): 36.8 (04 May 2021 12:27), Max: 36.8 (04 May 2021 12:27)  T(F): 98.2 (04 May 2021 12:27), Max: 98.2 (04 May 2021 12:27)  HR: 90 (04 May 2021 12:27) (90 - 90)  BP: 136/63 (04 May 2021 12:27) (136/63 - 136/63)  RR: 18 (04 May 2021 12:27) (18 - 18)  SpO2: 100% (04 May 2021 12:27) (100% - 100%)  I&O's Summary    03 May 2021 07:01  -  04 May 2021 07:00  --------------------------------------------------------  IN: 400 mL / OUT: 1900 mL / NET: -1500 mL        PHYSICAL EXAM:  General: No acute distress BMI-24  HEENT: EOMI, PERRL  Neck: Supple, [ ] JVD  Lungs: Equal air entry bilaterally; [ ] rales [ ] wheezing [ ] rhonchi  Heart: Regular rate and rhythm; [x ] murmur   2/6 [x ] systolic [ ] diastolic [x ] radiation[ ] rubs [ ]  gallops  Abdomen: Nontender, bowel sounds present  Extremities: No clubbing, cyanosis, [ ] edema [ ]Pulses  equal and intact  Nervous system:  Alert & Oriented X3, no focal deficits  Psychiatric: Normal affect  Skin: No rashes or lesions    LABS:  05-03    137  |  93<L>  |  67<H>  ----------------------------<  158<H>  4.3   |  22  |  10.87<H>    Ca    8.4      03 May 2021 08:17  Phos  6.0     05-03    TPro  5.9<L>  /  Alb  x   /  TBili  x   /  DBili  x   /  AST  x   /  ALT  x   /  AlkPhos  x   05-03    Creatinine Trend: 10.87<--, 8.38<--, 6.29<--, 5.03<--                        7.6    7.68  )-----------( 97       ( 03 May 2021 08:17 )             24.3             IMPRESSION AND PLAN:    80F with PMHx Religious (will not take blood products), ESRD (MWF, L AVF), DM on insulin (only premeal novolog), HTN, moderate AS, ILR 9/2020 presenting with oral bleeding today now self-resolved. Found to have leukocytosis    Oral bleeding- Left inner cheek bleeding now resolved most likely due to cheek biting  - Had prior teeth extractions and awaiting dentures, would speak to dentistry outpatient regarding patient's dentures   - hold ASA, continue to monitor for signs of bleeding  - monitor thrombocytopenia  - per Heme Dr Caldera- -if she has further oral mucosa bleeding start Aminocaproic acid 8 grams oral swish and spit every 6 hours    Leukocytosis- Leukocytosis to 17. Unclear etiology at this time. Afebrile. Only area of swelling is area of likely prior cut/bite in left cheek  - BCx x2- NTD, UA- neg, CXR clear  - trend WBC, fever curve  - s/p Cefepime in ED, monitor off abx for now as patient appears     Thrombocytopenia- Plt 50 and prior has been 120s. May be multifactorial - ASA use, heparin with HD?  - Heme consulted -hold ASA, continue EPO with HD, Vit b12 folate normal , elevated ferritin   - f/u spep, chino, flc to r/o myeloma  - no transfusion products given religous beliefs  - plt improved to 97K, according to chart base line at 120K   - follow up outpatient with Dr Becerra     ESRD on dialysis- Nephrology consult- cont on HD M-W-F  - Sevelamer     Type 2 diabetes mellitus with hyperglycemia, with long-term current use of insulin- HgA1C -7.2%, pt takes novolog 20u premeal at home  - Last A1C 5.4 in September and at that time was discharged off insulin. Will only give ISS at this time as patient notes hypoglycemic events with her insulin. Likely should discontinue insulin on discharge or decrease dosing.     Aortic valve stenosis- Resume home BB. Med rec for other medications.    Hypertension / HLD- Resume BB. Resume statin

## 2021-05-04 NOTE — DISCHARGE NOTE PROVIDER - NSDCCPCAREPLAN_GEN_ALL_CORE_FT
PRINCIPAL DISCHARGE DIAGNOSIS  Diagnosis: Oral bleeding  Assessment and Plan of Treatment: - Left inner cheek bleeding now resolved most likely due to cheek biting  - Had prior teeth extractions and awaiting dentures, speak to dentistry outpatient regarding your dentures   - held ASA in the hospital, you can resume taking it, continue to monitor for signs of bleeding  - follow up with Heme and PCP as outpatient for further management        SECONDARY DISCHARGE DIAGNOSES  Diagnosis: Thrombocytopenia  Assessment and Plan of Treatment: - you were seen by Cassi Caldera in the hospital- vitamin B12 and folate normal , elevated ferritin   - checked spep, chino, flc to r/o myeloma follow up results with Heme as outpatient  - continue Epogen with HD, no transfusion products given religous beliefs. Platelets improved to 97K, according to chart baseline at 120K   - follow up outpatient with Dr Becerra for further management    Diagnosis: ESRD (end stage renal disease) on dialysis  Assessment and Plan of Treatment: - you were seen by Nephro doctor in the hospital. Continue with HD as outpatient as schedulled    Diagnosis: Leukocytosis, unspecified type  Assessment and Plan of Treatment: - Leukocytosis to 17. Unclear etiology at this time  - Blood Cultures - Negative, UA- negative, CXR showed clear lungs  - no fevers, s/p Cefepime in ED, monitored off antibiotics   - Resolved    Diagnosis: Type 2 diabetes mellitus with hyperglycemia, with long-term current use of insulin  Assessment and Plan of Treatment: - Hga1C -7.2%. Monitor finger sticks pre-meal and bedtime, low salt, fat and carbohydrate diet, minimize glucose intake.  Exercise daily for at least 30 minutes and weight loss.  Follow up with primary care physician and endocrinologist for routine Hemoglobin A1C checks and management.  Follow up with your ophthalmologist for routine yearly vision exams.

## 2021-05-04 NOTE — DISCHARGE NOTE NURSING/CASE MANAGEMENT/SOCIAL WORK - PATIENT PORTAL LINK FT
You can access the FollowMyHealth Patient Portal offered by [facility] by registering at the following website: [email or web address]. By joining [facility]’s FollowMyHealth portal, you will also be able to view your health information using other applications (apps) compatible with our system.

## 2021-05-04 NOTE — DISCHARGE NOTE PROVIDER - CARE PROVIDER_API CALL
Larry Caldera)  Internal Medicine  [address], [address]  [place] [address]  Phone: [phone]  Fax: [phone]  Follow Up Time:     Reynold Hagen)  Internal Medicine; Nephrology  [address] [place] [address]  Phone: [phone]  Fax: [phone]  Follow Up Time:     Eder Becerra)  Internal Medicine  [address]  [place] [address]  Phone: [phone]  Fax: [phone]  Follow Up Time:

## 2021-05-04 NOTE — DISCHARGE NOTE PROVIDER - NSDCMRMEDTOKEN_GEN_ALL_CORE_FT
aspirin 81 mg oral tablet: 1 tab(s) orally once a day  atorvastatin 20 mg oral tablet: 1 tab(s) orally once a day  metoprolol succinate 25 mg oral tablet, extended release: 1 tab(s) orally once a day  NovoLOG FlexPen 100 units/mL injectable solution: 10 unit(s) injectable 3 times a day (before meals), As Needed for blood sugar levels &gt;150  ProAir HFA 90 mcg/inh inhalation aerosol: 2 puff(s) inhaled every 6 hours, As Needed  sevelamer carbonate 800 mg oral tablet: 1 tab(s) orally 3 times a day (with meals)  traMADol 50 mg oral tablet: 1 tab(s) orally every 12 hours, As Needed -for moderate pain MDD:2 tablets  Tylenol 500 mg oral tablet: 2 tab(s) orally every 6 hours, As Needed

## 2021-05-04 NOTE — PROGRESS NOTE ADULT - ASSESSMENT
80F with PMHx Rastafarian, ESRD (MWF, L AVF), DM on insulin, HTN, moderate AS, ILR 9/2020 presenting with oral bleeding today now self-resolved. Found to have leukocytosis. Renal following for ESRD and dialysis management     ESRD on dialysis.   out pt schedule MWF  s/p last HD outpt Friday  electrolytes and volume status acceptable.   renal diet   dose all meds dose ESRD  plan for next routine HD Monday w/2k bath, uf 1.5kg as tolerated by bp  HTN, controlled- c/w BB with hold parameters.   Anemia in CKD, Hb < goal. added Epo 10k w/HD tiw    labs, chart reviewed  poc d/w pt  [facility]  Office [phone]  Ans Serv [phone]  Cell - [phone]  
80F with PMHx Yarsani, ESRD (MWF, L AVF), DM on insulin, HTN, moderate AS, ILR 9/2020 presenting with oral bleeding today now self-resolved. Found to have leukocytosis. Renal following for ESRD and dialysis management     ESRD on dialysis.   out pt schedule MWF  HD yesterday uneventful, tolerated well.   No new labs to review today  renal diet   dose all meds dose ESRD  HTN, controlled- c/w BB with hold parameters.   Anemia in CKD, Hb < goal. continue Epo 10k w/HD tiw    Reynold Hagen MD  [facility]  Office [phone]  Ans Serv [phone]  [facility] - [phone] 
80F with PMHx Zoroastrianism (will not take blood products), ESRD (MWF, L AVF), DM on insulin (only premeal novolog), HTN, moderate AS, ILR 9/2020 presenting with oral bleeding on day of admission, after biting inner oral mucosa now self-resolved.       #Diathesis - trauma induced  -likely related to uremia  -if she has further oral mucosa bleeding start Aminocaproic acid 8 grams oral swish and spit every 6 hours  - noo further bleeding     #Thrombocytopenia/anemia  - b12 folate normal , elevated ferritin   -check spep, chino, flc to r/o myeloma  -agree to continue EPO with HD  -no transfusion products given religous beliefs  - plt improved to 97K, according to chart base line at 120K   - follow up outpatient with Dr Mariam Caldera MD  Hematology Oncology   O: [phone]  C: [phone]   
80F with PMHx Temple, ESRD (MWF, L AVF), DM on insulin, HTN, moderate AS, ILR 9/2020 presenting with oral bleeding today now self-resolved. Found to have leukocytosis. Renal following for ESRD and dialysis management     ESRD on dialysis.   out pt schedule MWF  electrolytes and volume status acceptable.   renal diet   dose all meds dose ESRD  scheduled for HD today w/2k bath, uf 1.5kg as tolerated by bp  HTN, controlled- c/w BB with hold parameters.   Anemia in CKD, Hb < goal. added Epo 10k w/HD tiw    labs, chart reviewed  poc d/w pt  [facility]  Office [phone]  Ans Serv [phone]  Cell - [phone]  
80F with PMHx Restoration (will not take blood products), ESRD (MWF, L AVF), DM on insulin (only premeal novolog), HTN, moderate AS, ILR 9/2020 presenting with oral bleeding today now self-resolved. Found to have leukocytosis
80F with PMHx Mormonism (will not take blood products), ESRD (MWF, L AVF), DM on insulin (only premeal novolog), HTN, moderate AS, ILR 9/2020 presenting with oral bleeding today now self-resolved. Found to have leukocytosis

## 2021-05-05 LAB
INTERPRETATION SERPL IFE-IMP: SIGNIFICANT CHANGE UP
SRA INTERP SER-IMP: SIGNIFICANT CHANGE UP

## 2021-05-06 LAB
CULTURE RESULTS: SIGNIFICANT CHANGE UP
CULTURE RESULTS: SIGNIFICANT CHANGE UP
SPECIMEN SOURCE: SIGNIFICANT CHANGE UP
SPECIMEN SOURCE: SIGNIFICANT CHANGE UP

## 2021-05-07 LAB
% ALBUMIN: 47.5 % — SIGNIFICANT CHANGE UP
% ALPHA 1: 4.1 % — SIGNIFICANT CHANGE UP
% ALPHA 2: 17.7 % — SIGNIFICANT CHANGE UP
% BETA: 18.2 % — SIGNIFICANT CHANGE UP
% GAMMA: 12.6 % — SIGNIFICANT CHANGE UP
ALBUMIN SERPL ELPH-MCNC: 2.8 G/DL — LOW (ref 3.3–4.4)
ALBUMIN/GLOB SERPL ELPH: 0.9 RATIO — SIGNIFICANT CHANGE UP
ALPHA1 GLOB SERPL ELPH-MCNC: 0.24 G/DL — SIGNIFICANT CHANGE UP (ref 0.1–0.3)
ALPHA2 GLOB SERPL ELPH-MCNC: 1 G/DL — SIGNIFICANT CHANGE UP (ref 0.6–1)
B-GLOBULIN SERPL ELPH-MCNC: 1.07 G/DL — SIGNIFICANT CHANGE UP (ref 0.6–1.1)
GAMMA GLOBULIN: 0.74 G/DL — SIGNIFICANT CHANGE UP (ref 0.7–1.7)
PROT PATTERN SERPL ELPH-IMP: SIGNIFICANT CHANGE UP
PROT PATTERN SERPL ELPH-IMP: SIGNIFICANT CHANGE UP
PROT SERPL-MCNC: 5.9 G/DL — SIGNIFICANT CHANGE UP

## 2021-05-11 ENCOUNTER — APPOINTMENT (OUTPATIENT)
Dept: ELECTROPHYSIOLOGY | Facility: CLINIC | Age: 81
End: 2021-05-11
Payer: MEDICARE

## 2021-05-11 ENCOUNTER — NON-APPOINTMENT (OUTPATIENT)
Age: 81
End: 2021-05-11

## 2021-05-11 PROCEDURE — G2066: CPT

## 2021-05-11 PROCEDURE — 93298 REM INTERROG DEV EVAL SCRMS: CPT

## 2021-06-15 ENCOUNTER — NON-APPOINTMENT (OUTPATIENT)
Age: 81
End: 2021-06-15

## 2021-06-15 ENCOUNTER — APPOINTMENT (OUTPATIENT)
Dept: ELECTROPHYSIOLOGY | Facility: CLINIC | Age: 81
End: 2021-06-15
Payer: SELF-PAY

## 2021-06-15 PROCEDURE — 93298 REM INTERROG DEV EVAL SCRMS: CPT

## 2021-06-15 PROCEDURE — G2066: CPT

## 2021-06-15 NOTE — ASU PREOP CHECKLIST - SELECT TESTS ORDERED
Patient is listed on [facility] list w/Dr Radha Faye as PCP. LOV 2017. Patient needs to call insurance w/PCP update. Left message to call back [phone]. BMP sent today/BMP/EKG/CXR/Results in MD note/COVID

## 2021-07-30 ENCOUNTER — INPATIENT (INPATIENT)
Facility: HOSPITAL | Age: 81
LOS: 3 days | Discharge: HOME | End: 2021-08-03
Attending: INTERNAL MEDICINE | Admitting: INTERNAL MEDICINE
Payer: MEDICARE

## 2021-07-30 VITALS
RESPIRATION RATE: 17 BRPM | DIASTOLIC BLOOD PRESSURE: 53 MMHG | HEIGHT: 68 IN | SYSTOLIC BLOOD PRESSURE: 113 MMHG | HEART RATE: 96 BPM | TEMPERATURE: 98 F | OXYGEN SATURATION: 100 %

## 2021-07-30 DIAGNOSIS — Z90.710 ACQUIRED ABSENCE OF BOTH CERVIX AND UTERUS: Chronic | ICD-10-CM

## 2021-07-30 DIAGNOSIS — Z98.890 OTHER SPECIFIED POSTPROCEDURAL STATES: Chronic | ICD-10-CM

## 2021-07-30 DIAGNOSIS — I77.0 ARTERIOVENOUS FISTULA, ACQUIRED: Chronic | ICD-10-CM

## 2021-07-30 NOTE — ED ADULT TRIAGE NOTE - CHIEF COMPLAINT QUOTE
Pt c/o syncopal episode 2030, patients family found her sitting on chair. Pt denies hitting head, blood thinner use. Pt denies any pain at this time. Pt has hx of syncopal episodes, has loop recorder. Pt receives HD MWF, LT AV fistula.

## 2021-07-31 DIAGNOSIS — Z29.9 ENCOUNTER FOR PROPHYLACTIC MEASURES, UNSPECIFIED: ICD-10-CM

## 2021-07-31 DIAGNOSIS — I35.0 NONRHEUMATIC AORTIC (VALVE) STENOSIS: ICD-10-CM

## 2021-07-31 DIAGNOSIS — R55 SYNCOPE AND COLLAPSE: ICD-10-CM

## 2021-07-31 DIAGNOSIS — Z53.1 PROCEDURE AND TREATMENT NOT CARRIED OUT BECAUSE OF PATIENT'S DECISION FOR REASONS OF BELIEF AND GROUP PRESSURE: ICD-10-CM

## 2021-07-31 DIAGNOSIS — N18.6 END STAGE RENAL DISEASE: ICD-10-CM

## 2021-07-31 DIAGNOSIS — I10 ESSENTIAL (PRIMARY) HYPERTENSION: ICD-10-CM

## 2021-07-31 DIAGNOSIS — E11.69 TYPE 2 DIABETES MELLITUS WITH OTHER SPECIFIED COMPLICATION: ICD-10-CM

## 2021-07-31 LAB
ALBUMIN SERPL ELPH-MCNC: 4.2 G/DL — SIGNIFICANT CHANGE UP (ref 3.3–5)
ALP SERPL-CCNC: 59 U/L — SIGNIFICANT CHANGE UP (ref 40–120)
ALT FLD-CCNC: 10 U/L — SIGNIFICANT CHANGE UP (ref 4–33)
ANION GAP SERPL CALC-SCNC: 19 MMOL/L — HIGH (ref 7–14)
AST SERPL-CCNC: 13 U/L — SIGNIFICANT CHANGE UP (ref 4–32)
BASOPHILS # BLD AUTO: 0.06 K/UL — SIGNIFICANT CHANGE UP (ref 0–0.2)
BASOPHILS NFR BLD AUTO: 0.4 % — SIGNIFICANT CHANGE UP (ref 0–2)
BILIRUB SERPL-MCNC: 0.4 MG/DL — SIGNIFICANT CHANGE UP (ref 0.2–1.2)
BUN SERPL-MCNC: 22 MG/DL — SIGNIFICANT CHANGE UP (ref 7–23)
CALCIUM SERPL-MCNC: 9.6 MG/DL — SIGNIFICANT CHANGE UP (ref 8.4–10.5)
CHLORIDE SERPL-SCNC: 93 MMOL/L — LOW (ref 98–107)
CO2 SERPL-SCNC: 25 MMOL/L — SIGNIFICANT CHANGE UP (ref 22–31)
CREAT SERPL-MCNC: 6.2 MG/DL — HIGH (ref 0.5–1.3)
EOSINOPHIL # BLD AUTO: 0.07 K/UL — SIGNIFICANT CHANGE UP (ref 0–0.5)
EOSINOPHIL NFR BLD AUTO: 0.5 % — SIGNIFICANT CHANGE UP (ref 0–6)
GLUCOSE BLDC GLUCOMTR-MCNC: 149 MG/DL — HIGH (ref 70–99)
GLUCOSE BLDC GLUCOMTR-MCNC: 223 MG/DL — HIGH (ref 70–99)
GLUCOSE SERPL-MCNC: 171 MG/DL — HIGH (ref 70–99)
HCT VFR BLD CALC: 34.8 % — SIGNIFICANT CHANGE UP (ref 34.5–45)
HGB BLD-MCNC: 10.7 G/DL — LOW (ref 11.5–15.5)
IANC: 11.27 K/UL — HIGH (ref 1.5–8.5)
IMM GRANULOCYTES NFR BLD AUTO: 0.4 % — SIGNIFICANT CHANGE UP (ref 0–1.5)
LYMPHOCYTES # BLD AUTO: 1.78 K/UL — SIGNIFICANT CHANGE UP (ref 1–3.3)
LYMPHOCYTES # BLD AUTO: 12.7 % — LOW (ref 13–44)
MAGNESIUM SERPL-MCNC: 2 MG/DL — SIGNIFICANT CHANGE UP (ref 1.6–2.6)
MCHC RBC-ENTMCNC: 29.6 PG — SIGNIFICANT CHANGE UP (ref 27–34)
MCHC RBC-ENTMCNC: 30.7 GM/DL — LOW (ref 32–36)
MCV RBC AUTO: 96.1 FL — SIGNIFICANT CHANGE UP (ref 80–100)
MONOCYTES # BLD AUTO: 0.73 K/UL — SIGNIFICANT CHANGE UP (ref 0–0.9)
MONOCYTES NFR BLD AUTO: 5.2 % — SIGNIFICANT CHANGE UP (ref 2–14)
NEUTROPHILS # BLD AUTO: 11.27 K/UL — HIGH (ref 1.8–7.4)
NEUTROPHILS NFR BLD AUTO: 80.8 % — HIGH (ref 43–77)
NRBC # BLD: 0 /100 WBCS — SIGNIFICANT CHANGE UP
NRBC # FLD: 0.05 K/UL — HIGH
PHOSPHATE SERPL-MCNC: 4.1 MG/DL — SIGNIFICANT CHANGE UP (ref 2.5–4.5)
PLATELET # BLD AUTO: 112 K/UL — LOW (ref 150–400)
POTASSIUM SERPL-MCNC: 4.2 MMOL/L — SIGNIFICANT CHANGE UP (ref 3.5–5.3)
POTASSIUM SERPL-SCNC: 4.2 MMOL/L — SIGNIFICANT CHANGE UP (ref 3.5–5.3)
PROT SERPL-MCNC: 7.4 G/DL — SIGNIFICANT CHANGE UP (ref 6–8.3)
RBC # BLD: 3.62 M/UL — LOW (ref 3.8–5.2)
RBC # FLD: 17.9 % — HIGH (ref 10.3–14.5)
SARS-COV-2 RNA SPEC QL NAA+PROBE: SIGNIFICANT CHANGE UP
SODIUM SERPL-SCNC: 137 MMOL/L — SIGNIFICANT CHANGE UP (ref 135–145)
TROPONIN T, HIGH SENSITIVITY RESULT: 61 NG/L — CRITICAL HIGH
TROPONIN T, HIGH SENSITIVITY RESULT: 65 NG/L — CRITICAL HIGH
WBC # BLD: 13.97 K/UL — HIGH (ref 3.8–10.5)
WBC # FLD AUTO: 13.97 K/UL — HIGH (ref 3.8–10.5)

## 2021-07-31 PROCEDURE — 99223 1ST HOSP IP/OBS HIGH 75: CPT

## 2021-07-31 PROCEDURE — 71045 X-RAY EXAM CHEST 1 VIEW: CPT | Mod: 26

## 2021-07-31 RX ORDER — INSULIN LISPRO 100/ML
VIAL (ML) SUBCUTANEOUS AT BEDTIME
Refills: 0 | Status: DISCONTINUED | OUTPATIENT
Start: 2021-07-31 | End: 2021-08-03

## 2021-07-31 RX ORDER — DEXTROSE 50 % IN WATER 50 %
12.5 SYRINGE (ML) INTRAVENOUS ONCE
Refills: 0 | Status: DISCONTINUED | OUTPATIENT
Start: 2021-07-31 | End: 2021-08-03

## 2021-07-31 RX ORDER — ASPIRIN/CALCIUM CARB/MAGNESIUM 324 MG
81 TABLET ORAL DAILY
Refills: 0 | Status: DISCONTINUED | OUTPATIENT
Start: 2021-07-31 | End: 2021-08-03

## 2021-07-31 RX ORDER — DEXTROSE 50 % IN WATER 50 %
15 SYRINGE (ML) INTRAVENOUS ONCE
Refills: 0 | Status: DISCONTINUED | OUTPATIENT
Start: 2021-07-31 | End: 2021-08-03

## 2021-07-31 RX ORDER — LANOLIN ALCOHOL/MO/W.PET/CERES
3 CREAM (GRAM) TOPICAL AT BEDTIME
Refills: 0 | Status: DISCONTINUED | OUTPATIENT
Start: 2021-07-31 | End: 2021-08-03

## 2021-07-31 RX ORDER — HEPARIN SODIUM 5000 [USP'U]/ML
5000 INJECTION INTRAVENOUS; SUBCUTANEOUS EVERY 8 HOURS
Refills: 0 | Status: DISCONTINUED | OUTPATIENT
Start: 2021-07-31 | End: 2021-08-03

## 2021-07-31 RX ORDER — DEXTROSE 50 % IN WATER 50 %
25 SYRINGE (ML) INTRAVENOUS ONCE
Refills: 0 | Status: DISCONTINUED | OUTPATIENT
Start: 2021-07-31 | End: 2021-08-03

## 2021-07-31 RX ORDER — SEVELAMER CARBONATE 2400 MG/1
800 POWDER, FOR SUSPENSION ORAL
Refills: 0 | Status: DISCONTINUED | OUTPATIENT
Start: 2021-07-31 | End: 2021-08-03

## 2021-07-31 RX ORDER — ONDANSETRON 8 MG/1
4 TABLET, FILM COATED ORAL EVERY 8 HOURS
Refills: 0 | Status: DISCONTINUED | OUTPATIENT
Start: 2021-07-31 | End: 2021-08-03

## 2021-07-31 RX ORDER — SODIUM CHLORIDE 9 MG/ML
1000 INJECTION, SOLUTION INTRAVENOUS
Refills: 0 | Status: DISCONTINUED | OUTPATIENT
Start: 2021-07-31 | End: 2021-08-03

## 2021-07-31 RX ORDER — INSULIN LISPRO 100/ML
VIAL (ML) SUBCUTANEOUS
Refills: 0 | Status: DISCONTINUED | OUTPATIENT
Start: 2021-07-31 | End: 2021-08-03

## 2021-07-31 RX ORDER — METOPROLOL TARTRATE 50 MG
50 TABLET ORAL DAILY
Refills: 0 | Status: DISCONTINUED | OUTPATIENT
Start: 2021-07-31 | End: 2021-08-03

## 2021-07-31 RX ORDER — ALBUTEROL 90 UG/1
2 AEROSOL, METERED ORAL EVERY 6 HOURS
Refills: 0 | Status: DISCONTINUED | OUTPATIENT
Start: 2021-07-31 | End: 2021-08-03

## 2021-07-31 RX ORDER — ACETAMINOPHEN 500 MG
650 TABLET ORAL EVERY 6 HOURS
Refills: 0 | Status: DISCONTINUED | OUTPATIENT
Start: 2021-07-31 | End: 2021-08-03

## 2021-07-31 RX ORDER — ACETAMINOPHEN 500 MG
2 TABLET ORAL
Qty: 0 | Refills: 0 | DISCHARGE

## 2021-07-31 RX ORDER — ATORVASTATIN CALCIUM 80 MG/1
20 TABLET, FILM COATED ORAL AT BEDTIME
Refills: 0 | Status: DISCONTINUED | OUTPATIENT
Start: 2021-07-31 | End: 2021-08-03

## 2021-07-31 RX ORDER — GLUCAGON INJECTION, SOLUTION 0.5 MG/.1ML
1 INJECTION, SOLUTION SUBCUTANEOUS ONCE
Refills: 0 | Status: DISCONTINUED | OUTPATIENT
Start: 2021-07-31 | End: 2021-08-03

## 2021-07-31 RX ADMIN — Medication 650 MILLIGRAM(S): at 20:00

## 2021-07-31 RX ADMIN — ATORVASTATIN CALCIUM 20 MILLIGRAM(S): 80 TABLET, FILM COATED ORAL at 22:02

## 2021-07-31 RX ADMIN — Medication 2: at 18:03

## 2021-07-31 RX ADMIN — Medication 650 MILLIGRAM(S): at 19:27

## 2021-07-31 RX ADMIN — HEPARIN SODIUM 5000 UNIT(S): 5000 INJECTION INTRAVENOUS; SUBCUTANEOUS at 22:02

## 2021-07-31 RX ADMIN — SEVELAMER CARBONATE 800 MILLIGRAM(S): 2400 POWDER, FOR SUSPENSION ORAL at 18:04

## 2021-07-31 NOTE — ED ADULT NURSE NOTE - OBJECTIVE STATEMENT
Pt received to room 26 a&ox4, ambulatory with walker at baseline, c/o syncopal episode. Pt hx ESRD (dialysis M/W/F) with left av fistula, loop recorder. Pt daughter at bedside. Pt daughter states that she heard a loud noise earlier this evening and she went to check on the pt and she found her sitting slumped over on the toilet. Pt states that she was washing her hands and then all of a sudden she woke up and her daughter was standing in front of her. Pt denies hitting her head. Pt denies any headaches, dizziness, chest pain, shortness of breath, abdominal pain, nausea. PT in no acute distress at this time. Respirations even and unlabored. Pt placed on cardiac monitor. MD at bedside for eval. Awaiting further orders.

## 2021-07-31 NOTE — ED ADULT NURSE REASSESSMENT NOTE - NS ED NURSE REASSESS COMMENT FT1
Pt NSR on cardiac monitor b/l wnl. Pt resting has no co chest pain at this time, tolerated full breakfast tray. awaiting floor bed.

## 2021-07-31 NOTE — ED PROVIDER NOTE - CLINICAL SUMMARY MEDICAL DECISION MAKING FREE TEXT BOX
80yo F presenting for fall in the setting of Aortic stenosis PMHx, will need Echo workup in the morning. 80yo F presenting for syncope in the setting of Aortic stenosis PMHx, will need Echo workup in the morning.

## 2021-07-31 NOTE — CONSULT NOTE ADULT - ASSESSMENT
82yo F PMHx of Aortic stenosis, DM, ESRD on dialysis at [facility] via LUE AVF presenting for syncope and fall from standing height.    ESRD  Lytes and volume acceptable  consent in HD unit 7 floor  plan for HD Monday  H/H at goal  Phos WNL, continue binders  daily BMP  Renal diet      Thank you for allowing me to participate in the care of your patient    For any question, call:  Cell # [phone]  Pager # [phone]  Callback # [phone]

## 2021-07-31 NOTE — ED PROVIDER NOTE - ATTENDING CONTRIBUTION TO CARE
82yo F PMHx of ESRD on HD M/W/F, DM, HTN, moderate Aortic stenosis, syncope (last episode ~6months ago, has loop recorder), presenting to ED with daughter for unwitnessed syncopal episode on toilet. Patient says she was in bathroom sitting on toilet resting and next memory she has is waking up on floor with daughter.  Daughter says she noticed mother slumped over and breathing but nonresponsive sitting on toilet. Daughter called 911 who told her to lay patient flat on ground so daughter moved patient to supine position.  No confusion after episode, bowel/bladder incontinence, tongue biting, focal weakness or numbness, headaches, or other complaints. No prodromal symptoms prior to episode. Now feeling well at baseline.         General: Patient in no apparent distress, AAO x 3  Skin: Dry and intact  HEENT: Head atraumatic. Oral mucosa moist.   Eyes: Conjunctiva normal  Cardiac: Regular rhythm and rate. No pretibial edema b/l. +systolic murmur  Respiratory: Lungs clear b/l and symmetric. No respiratory distress. Able to speak in complete sentences.  Gastrointestinal: Abdomen soft, nondistended, nontender  Musculoskeletal: Moves all extremities spontaneously  Neurological: alert and oriented to person, place, and time. CN 2-12 grossly intact. Normal gait. 5/5 motor in all distal extremities   Psychiatric: Cooperative    EKG nsr without any acute ischemic changes    a/p  syncope without prodrome with h/o aortic stenosis  last echo with moderate AS in 2020  concern for cardiogenic syncope   will need admission with tele and repeat echo to eval for dz progression

## 2021-07-31 NOTE — H&P ADULT - NSHPREVIEWOFSYSTEMS_GEN_ALL_CORE
REVIEW OF SYSTEMS  General:	  Skin/Breast:  Ophthalmologic:  ENMT:	  Respiratory and Thorax:  Cardiovascular:	  Gastrointestinal:	  Genitourinary:	  Musculoskeletal:	  Neurological:	  Psychiatric:	  Hematology/Lymphatics:	  Endocrine:	  Allergic/Immunologic: REVIEW OF SYSTEMS  General:	no fever, reports chronically feeling cold  Skin/Breast: no rash, itch  Ophthalmologic: no blurry vision or eye pain  ENMT: no sore throat or trouble swallowing  Respiratory and Thorax: no cough/SOB  Cardiovascular: no CP/palpitations, +GONZALEZ	  Gastrointestinal: no N/V/abdominal pain/constipation/diarrhea	  Genitourinary: on HD but still making urine, denies dysuria/incontinence	  Musculoskeletal: R shoulder pain (attributes it to rotator cuff issues) chronic, also intermittent leg pain	  Neurological: no focal weakness, +numbness/tingling in fingers/toes, possibly neuropathy	  Psychiatric: no issues	  Hematology/Lymphatics: no easy bleeding/bruising	  Endocrine: + diabetes on insulin, has episodes of hypoglycemia, aware	  Allergic/Immunologic: NKDA

## 2021-07-31 NOTE — H&P ADULT - NSHPPHYSICALEXAM_GEN_ALL_CORE
Vital Signs Last 24 Hrs  T(C): 36.9 (31 Jul 2021 13:17), Max: 36.9 (30 Jul 2021 22:05)  T(F): 98.4 (31 Jul 2021 13:17), Max: 98.5 (30 Jul 2021 23:22)  HR: 81 (31 Jul 2021 13:17) (81 - 96)  BP: 137/63 (31 Jul 2021 13:17) (113/49 - 137/63)  RR: 18 (31 Jul 2021 13:17) (16 - 20)  SpO2: 100% (31 Jul 2021 13:17) (100% - 100%)    PHYSICAL EXAM:  Constitutional: NAD, lying in bed, daughter at bedside  Eyes: EOMI, clear sclera/conjunctiva  ENMT: MMM  Neck: supple, no JVD appreciated  Back: non-tender  Respiratory: no respiratory distress, CTAB, speaking in full sentences  Cardiovascular: S1S2 RRR, +systolic murmur  Gastrointestinal: soft, non-tender +BS  Genitourinary: no CVA tenderness  Extremities: no c/c/e  Vascular: wwp  Neurological: moving all extremities, speech fluent, non-focal  Skin: LUE AVF in place, +bruit/thrill  Musculoskeletal: no calf tenderness, able to do AROM R shoulder  Psychiatric: AOx3, calm, pleasant

## 2021-07-31 NOTE — H&P ADULT - NSHPSOCIALHISTORY_GEN_ALL_CORE
- Daughter is support  - multiple siblings  (patient states she is the only one left)  - denies toxic habits

## 2021-07-31 NOTE — ED PROVIDER NOTE - CARE PLAN
Principal Discharge DX:	Aortic stenosis   Principal Discharge DX:	Aortic stenosis  Secondary Diagnosis:	Syncope

## 2021-07-31 NOTE — H&P ADULT - NSICDXPASTMEDICALHX_GEN_ALL_CORE_FT
PAST MEDICAL HISTORY:  Aortic stenosis wishes to discuss TAVR, has Cardiology clearance    Bilateral cataracts     Cough hx of pt on inhaler under Pulmonology  care , last time used November 2020    Diabetic retinopathy     DM (diabetes mellitus) on insulin    ESRD (end stage renal disease) on dialysis     Fibroids hx of    Gout     H/O syncope 09/19/2020- pt hospitalized , cardiac workup done , HD started loop recorder done    HLD (hyperlipidemia)     Hypertension     Refusal of blood transfusions as patient is Confucianism

## 2021-07-31 NOTE — H&P ADULT - ASSESSMENT
80 yo F w/ AS, HTN, DM2 (on insulin), ESRD on HD MWF via LUE AVF, presenting w/ syncope, admitted to tele for further cardiac workup.

## 2021-07-31 NOTE — H&P ADULT - PROBLEM SELECTOR PLAN 4
- patient reports history of multiple episodes of hypoglycemia  - daughter unclear if patient taking levemir at home, reports taking novolog prn  - will cover with sliding scale insulin for now, monitor FS and adjust regimen as needed, wish to avoid episodes of hypoglycemia

## 2021-07-31 NOTE — CONSULT NOTE ADULT - SUBJECTIVE AND OBJECTIVE BOX
[facility] - CHRISTELLE Archer / CHRISTELLE Hagen / DARLYN Sanchez/ CHRISTELLE Melton/ CHRISTELLE Herbert/ HANS Greene / CARLITO Villalba / JORDAN Calderon  -------------------------------------------------------------------------------------------------------  The patient seen and examined today.  HPI:  82yo F PMHx of Aortic stenosis, DM, ESRD on dialysis at [facility] via LUE AVF presenting for syncope and fall from standing height. Pts daughter found her slumped over on bathroom toilet not on floor, denies LOC, tongue biting, incontinence.    PAST MEDICAL & SURGICAL HISTORY:  ESRD (end stage renal disease) on dialysis    DM (diabetes mellitus)    Aortic stenosis  pt wants postpone TAVR after fistula , has Cardiology clearance    Hypertension    Gout    Diabetic retinopathy    Bilateral cataracts    Cough  hx of pt on inhaler under Pulmonology  care , last time used 2020    H/O syncope  2020- pt hospitalized , cardiac workup done , HD started loop recorder done    Refusal of blood transfusions as patient is [employer]    Fibroids  hx of    HLD (hyperlipidemia)    AV fistula  left arm- 2020    History of hysterectomy  1987    History of vascular access device  right chest permacath- 2020    History of loop recorder  left chest - 2020      Allergies :- No Known Allergies    Home Medications Reviewed  Hospital Medications:   MEDICATIONS  (STANDING):    SOCIAL HISTORY:  Denies ETOh,Smoking,   FAMILY HISTORY:  Family history of heart disease  mother, father, sisters-     Family history of hypertension  mother, father, sisiter-     Family history of diabetes mellitus  sister -         REVIEW OF SYSTEMS:  CONSTITUTIONAL: No weakness, fevers or chills  EYES/ENT: No visual changes;  No vertigo or throat pain   NECK: No pain or stiffness  RESPIRATORY: No cough, wheezing, hemoptysis; No shortness of breath  CARDIOVASCULAR: No chest pain or palpitations.  GASTROINTESTINAL: No abdominal or epigastric pain. No nausea, vomiting, or hematemesis; No diarrhea or constipation. No melena or hematochezia.  GENITOURINARY: No dysuria, frequency, foamy urine, urinary urgency, incontinence or hematuria  NEUROLOGICAL: No numbness or weakness  SKIN: No itching, burning, rashes, or lesions   VASCULAR: No bilateral lower extremity edema.   All other review of systems is negative unless indicated above.    VITALS:  T(F): 98.1 (21 @ 08:20), Max: 98.5 (21 @ 23:22)  HR: 84 (21 @ 08:20)  BP: 113/49 (21 @ 08:20)  RR: 20 (21 @ 08:20)  SpO2: 100% (21 @ 08:20)  Wt(kg): --    Height (cm): 172.7 ( @ 22:05)    PHYSICAL EXAM:  Constitutional: NAD  HEENT: anicteric sclera, oropharynx clear, MMM  Neck: supple.   Respiratory: Bilateral equal breath sounds , no wheezes, no crackles  Cardiovascular: S1, S2, Regular, Murmur present.  Gastrointestinal: Bowel Sound present, soft, NT/ND  Extremities: No cyanosis or clubbing. No peripheral edema  Neurological: Alert and oriented x 3, no focal deficits  Psychiatric: Normal mood, normal affect  : No CVA tenderness. No recinos.   Skin: No rashes    Data:      137  |  93<L>  |  22  ----------------------------<  171<H>  4.2   |  25  |  6.20<H>    Ca    9.6      2021 02:21  Phos  4.1       Mg     2.00         TPro  7.4  /  Alb  4.2  /  TBili  0.4  /  DBili      /  AST  13  /  ALT  10  /  AlkPhos  59      Creatinine Trend: 6.20 <--                        10.7   13.97 )-----------( 112      ( 2021 02:21 )             34.8     Urine Studies:

## 2021-07-31 NOTE — H&P ADULT - PROBLEM SELECTOR PLAN 3
- appreciate renal input  - c/w sevelamer  - no urgent need for HD at this time  - c/w routine HD as per renal  - renal restricted diet

## 2021-07-31 NOTE — H&P ADULT - PROBLEM SELECTOR PLAN 2
- f/u cardiology evaluation, patient seen by Dr. Joshi on prior admission  - patient and daughter appear to be interested in TAVR if ultimately needed/offered  - on asa/statin/B-blocker

## 2021-07-31 NOTE — ED PROVIDER NOTE - OBJECTIVE STATEMENT
80yo F PMHx of Aortic stenosis, DM, ESRD on dialysis presenting for syncope and fall from standing height. Pts daughter found her on the bathroom floor, denies LOC, tongue biting, incontinence. 80yo F PMHx of Aortic stenosis, DM, ESRD on dialysis presenting for syncope and fall from standing height. Pts daughter found her slumped over on bathroom toilet not on floor, denies LOC, tongue biting, incontinence.

## 2021-07-31 NOTE — ED PROVIDER NOTE - PMH
Aortic stenosis  pt wants postpone TAVR after fistula , has Cardiology clearance  Bilateral cataracts    Cough  hx of pt on inhaler under Pulmonology  care , last time used November 2020  Diabetic retinopathy    DM (diabetes mellitus)    ESRD (end stage renal disease) on dialysis    Fibroids  hx of  Gout    H/O syncope  09/19/2020- pt hospitalized , cardiac workup done , HD started loop recorder done  HLD (hyperlipidemia)    Hypertension    Refusal of blood transfusions as patient is [employer]

## 2021-07-31 NOTE — H&P ADULT - HISTORY OF PRESENT ILLNESS
80 yo F w/ AS, HTN, DM2 (on insulin), ESRD on HD MWF via LUE AVF, presenting w/ syncope and fall yesterday evening. Patient did not recall the full episode so collateral was obtained from daughter at bedside. Patient was in the bathroom washing up, was subsequently found by daughter leaning over the sink, slumped over, did not respond when daughter spoke to her, took few minutes to come to. Had prior episodes of syncope in the past, patient did not note any preceding chest pain/palpitations/lightheadedness, blurry vision. Daughter did not note any bowel/bladder incontinence or seizure like activity. They are concerned that event could be due to patient's aortic stenosis or another cardiac cause.     Patient reports being in her usual state of health prior. Denies infectious symptoms. She went for HD on Friday and reports that sometimes she feels weak afterwards. She also has episodes of hypoglycemia (feels jumpy/nervous/sweaty when episodes occur so is hypoglycemia aware), denies any similar symptoms on day of event, daughter doesn't think patient was hypoglycemic when checked by EMS. Reports dose of toprol was increased to 50 mg since last admission, has been using novolog prn based on her FS, daughter unsure if patient taking levemir and what dose.  80 yo F w/ AS, HTN, DM2 (on insulin), ESRD on HD MWF via LUE AVF, presenting w/ syncope and fall yesterday evening. Patient did not recall the full episode so collateral was obtained from daughter at bedside. Patient was in the bathroom washing up, was subsequently found by daughter leaning over the sink, slumped over, did not respond when daughter spoke to her, took few minutes to come to. Had prior episodes of syncope in the past, patient did not note any preceding chest pain/palpitations/lightheadedness, blurry vision. Denies straining on the toilet. Daughter did not note any bowel/bladder incontinence or seizure like activity. They are concerned that event could be due to patient's aortic stenosis or another cardiac cause.     Patient reports being in her usual state of health prior. Denies infectious symptoms. She went for HD on Friday and reports that sometimes she feels weak afterwards. She also has episodes of hypoglycemia (feels jumpy/nervous/sweaty when episodes occur so is hypoglycemia aware), denies any similar symptoms on day of event, daughter doesn't think patient was hypoglycemic when checked by EMS. Reports dose of toprol was increased to 50 mg since last admission, has been using novolog prn based on her FS, daughter unsure if patient taking levemir and what dose. Of note patient is a [employer].

## 2021-07-31 NOTE — PATIENT PROFILE ADULT - NSASFALLNEEDSASSIST_GEN_A_NUR
Infant born on [birth year] @ 0014 via rapid vaginal delivery in the [address] [place] [address]. Judith Burks CNM [facility]) in attendance. Mother GBS negative. SROM on 04/28/17 @ 0012 with clear fluid. Infant with Nuchal CAN x 1 loose. Infant dried with warmed towel and given tactile stimulation with good response. Apgars 8/9. Infant placed skin to skin with mother and covered with new warmed towel. Hat and bands placed on infant. Magic hour started. yes

## 2021-07-31 NOTE — H&P ADULT - PROBLEM SELECTOR PLAN 6
- anemia, does not require pRBC transfusion at this time, is [employer] so would refuse them anyway  - hgb currently appears at goal, f/u renal re: role for AAKASH with HD (patient reports getting something with HD as outpatient for her anemia)

## 2021-07-31 NOTE — H&P ADULT - PROBLEM SELECTOR PLAN 1
- monitor on tele, r/o arrhythmia as cause of syncope  - obtain echo to evaluate valves/function especially given hx of AS  - no e/o head trauma on exam  - elevated trop could be in setting of ESRD, can repeat, doubt ACS, patient without active chest pain, EKG w/o acute ischemic changes  - per history did not appear to be straining, no reported hypoglycemia  - no abnormal movements, bowel/bladder incontinence to suggest seizure  - received HD earlier on Friday prior to episode, ?related to fluid removal, could check orthostatics as well   - f/u cardiology evaluation

## 2021-07-31 NOTE — ED ADULT NURSE REASSESSMENT NOTE - NS ED NURSE REASSESS COMMENT FT1
A&Ox4. NAD. denies SOB, cp, dizziness, n/v, blurred vision.  respirations are even and un labored. skin intact. safety precautions maintained. will continue to monitor.

## 2021-07-31 NOTE — ED PROVIDER NOTE - PHYSICAL EXAMINATION
General: NAD  HEENT: NCAT, PERRL  Cardiac: RRR, harsh blowing systolic murmur noted, 2+ peripheral pulses  Chest: CTA  Abdomen: soft, non-distended, bowel sounds present, no ttp, no rebound or guarding  Extremities: no peripheral edema, calf tenderness, or leg size discrepancies  Skin: no rashes  Neuro: AAOx3, 5+motor, sensory grossly intact  Psych: mood and affect appropriate

## 2021-08-01 LAB
A1C WITH ESTIMATED AVERAGE GLUCOSE RESULT: 6.6 % — HIGH (ref 4–5.6)
ANION GAP SERPL CALC-SCNC: 20 MMOL/L — HIGH (ref 7–14)
BUN SERPL-MCNC: 48 MG/DL — HIGH (ref 7–23)
CALCIUM SERPL-MCNC: 9.3 MG/DL — SIGNIFICANT CHANGE UP (ref 8.4–10.5)
CHLORIDE SERPL-SCNC: 96 MMOL/L — LOW (ref 98–107)
CO2 SERPL-SCNC: 23 MMOL/L — SIGNIFICANT CHANGE UP (ref 22–31)
COVID-19 SPIKE DOMAIN AB INTERP: POSITIVE
COVID-19 SPIKE DOMAIN ANTIBODY RESULT: 38.4 U/ML — HIGH
CREAT SERPL-MCNC: 9.1 MG/DL — HIGH (ref 0.5–1.3)
ESTIMATED AVERAGE GLUCOSE: 143 — SIGNIFICANT CHANGE UP
GLUCOSE BLDC GLUCOMTR-MCNC: 136 MG/DL — HIGH (ref 70–99)
GLUCOSE BLDC GLUCOMTR-MCNC: 151 MG/DL — HIGH (ref 70–99)
GLUCOSE BLDC GLUCOMTR-MCNC: 166 MG/DL — HIGH (ref 70–99)
GLUCOSE BLDC GLUCOMTR-MCNC: 167 MG/DL — HIGH (ref 70–99)
GLUCOSE SERPL-MCNC: 157 MG/DL — HIGH (ref 70–99)
HCT VFR BLD CALC: 33 % — LOW (ref 34.5–45)
HGB BLD-MCNC: 10 G/DL — LOW (ref 11.5–15.5)
MCHC RBC-ENTMCNC: 29.1 PG — SIGNIFICANT CHANGE UP (ref 27–34)
MCHC RBC-ENTMCNC: 30.3 GM/DL — LOW (ref 32–36)
MCV RBC AUTO: 95.9 FL — SIGNIFICANT CHANGE UP (ref 80–100)
NRBC # BLD: 0 /100 WBCS — SIGNIFICANT CHANGE UP
NRBC # FLD: 0.03 K/UL — HIGH
PLATELET # BLD AUTO: 132 K/UL — LOW (ref 150–400)
POTASSIUM SERPL-MCNC: 4 MMOL/L — SIGNIFICANT CHANGE UP (ref 3.5–5.3)
POTASSIUM SERPL-SCNC: 4 MMOL/L — SIGNIFICANT CHANGE UP (ref 3.5–5.3)
RBC # BLD: 3.44 M/UL — LOW (ref 3.8–5.2)
RBC # FLD: 18 % — HIGH (ref 10.3–14.5)
SARS-COV-2 IGG+IGM SERPL QL IA: 38.4 U/ML — HIGH
SARS-COV-2 IGG+IGM SERPL QL IA: POSITIVE
SODIUM SERPL-SCNC: 139 MMOL/L — SIGNIFICANT CHANGE UP (ref 135–145)
TROPONIN T, HIGH SENSITIVITY RESULT: 62 NG/L — CRITICAL HIGH
WBC # BLD: 8.56 K/UL — SIGNIFICANT CHANGE UP (ref 3.8–10.5)
WBC # FLD AUTO: 8.56 K/UL — SIGNIFICANT CHANGE UP (ref 3.8–10.5)

## 2021-08-01 PROCEDURE — 93306 TTE W/DOPPLER COMPLETE: CPT | Mod: 26

## 2021-08-01 PROCEDURE — 93971 EXTREMITY STUDY: CPT | Mod: 26,LT

## 2021-08-01 PROCEDURE — 93010 ELECTROCARDIOGRAM REPORT: CPT

## 2021-08-01 RX ADMIN — SEVELAMER CARBONATE 800 MILLIGRAM(S): 2400 POWDER, FOR SUSPENSION ORAL at 13:08

## 2021-08-01 RX ADMIN — ATORVASTATIN CALCIUM 20 MILLIGRAM(S): 80 TABLET, FILM COATED ORAL at 23:37

## 2021-08-01 RX ADMIN — Medication 1: at 09:07

## 2021-08-01 RX ADMIN — HEPARIN SODIUM 5000 UNIT(S): 5000 INJECTION INTRAVENOUS; SUBCUTANEOUS at 05:37

## 2021-08-01 RX ADMIN — SEVELAMER CARBONATE 800 MILLIGRAM(S): 2400 POWDER, FOR SUSPENSION ORAL at 09:08

## 2021-08-01 RX ADMIN — HEPARIN SODIUM 5000 UNIT(S): 5000 INJECTION INTRAVENOUS; SUBCUTANEOUS at 23:37

## 2021-08-01 RX ADMIN — Medication 1: at 17:36

## 2021-08-01 RX ADMIN — Medication 81 MILLIGRAM(S): at 17:36

## 2021-08-01 RX ADMIN — Medication 50 MILLIGRAM(S): at 05:39

## 2021-08-01 RX ADMIN — SEVELAMER CARBONATE 800 MILLIGRAM(S): 2400 POWDER, FOR SUSPENSION ORAL at 17:36

## 2021-08-01 NOTE — PROGRESS NOTE ADULT - TIME BILLING
- Review of records, telemetry, vital signs and daily labs.   - General and cardiovascular physical examination.  - Generation of cardiovascular treatment plan.  - Coordination of care.      Patient was seen and examined by me on 08/01/2021 ,interim events noted,labs and radiology studies reviewed.  Amol Joshi MD,FACC.  [address] [place], [place] [id number].  [phone]

## 2021-08-01 NOTE — PROGRESS NOTE ADULT - SUBJECTIVE AND OBJECTIVE BOX
DATE OF SERVICE:  08/01/2021  Patient was seen and examined on 08/01/2021    .Interim events noted.Consultant notes ,Labs,Telemetry reviewed by me    PRESENTING CC:Syncope    HPI and HOSPITAL COURSE: 82 yo F w/ AS, HTN, DM2 (on insulin), ESRD on HD MWF via LUE AVF, presenting w/ syncope and fall    Patient did not recall the full episode so collateral was obtained from daughter at bedside.   Patient was in the bathroom washing up, was subsequently found by daughter leaning over the sink, slumped over, did not respond when daughter spoke to her, took few minutes to come to.   Had prior episodes of syncope in the past, had ILR implanted 9/22/20  Patient did not note any preceding chest pain/palpitations/lightheadedness, blurry vision.   Denies straining on the toilet. Daughter did not note any bowel/bladder incontinence or seizure like activity. They are concerned that event could be due to patient's aortic stenosis or another cardiac cause.     Patient reports being in her usual state of health prior. Denies infectious symptoms.   She went for HD on Friday and reports that sometimes she feels weak afterwards.   She also has episodes of hypoglycemia (feels jumpy/nervous/sweaty when episodes occur so is hypoglycemia aware), denies any similar symptoms on day of event, daughter doesn't think patient was hypoglycemic when checked by EMS.   Reports dose of toprol was increased to 50 mg since last admission, has been using novolog prn based on her FS, daughter unsure if patient taking levemir and what dose. 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      INTERIM EVENTS:Awake alert no dizziness dyspnea      PMH -reviewed admission note, no change since admission  Heart Failure: Acute [ ] Chronic [ ] Acute on Chronic [ ] Diastolic [ ] Systolic [ ] Combined Systolic and Diastolic[ ]  KIANNA[ ]  ATN[ ]  CKD I [ ] CKDII [ ] CKD III [ ] CKD IV [ ] CKD V [ ] ESRD[ ]  HTN[ ] CVA[ ] DM[x ] COPD[ ] COVID[ ] AF[ ]  PPM[ ] ICD[ ]    MEDICATIONS  (STANDING):  aspirin enteric coated 81 milliGRAM(s) Oral daily  atorvastatin 20 milliGRAM(s) Oral at bedtime  glucagon  Injectable 1 milliGRAM(s) IntraMuscular once  heparin   Injectable 5000 Unit(s) SubCutaneous every 8 hours  insulin lispro (ADMELOG) corrective regimen sliding scale   SubCutaneous three times a day before meals  insulin lispro (ADMELOG) corrective regimen sliding scale   SubCutaneous at bedtime  metoprolol succinate ER 50 milliGRAM(s) Oral daily  sevelamer carbonate 800 milliGRAM(s) Oral three times a day with meals    MEDICATIONS  (PRN):  acetaminophen   Tablet .. 650 milliGRAM(s) Oral every 6 hours PRN Temp greater or equal to 38.5C (101.3F), Mild Pain (1 - 3)  ALBUTerol    90 MICROgram(s) HFA Inhaler 2 Puff(s) Inhalation every 6 hours PRN Shortness of Breath and/or Wheezing  melatonin 3 milliGRAM(s) Oral at bedtime PRN Insomnia  ondansetron Injectable 4 milliGRAM(s) IV Push every 8 hours PRN Nausea and/or Vomiting            REVIEW OF SYSTEMS:  Constitutional: [ ] fever, [ ]weight loss,  [x ]fatigue  Eyes: [ ] visual changes  Respiratory: [ ]shortness of breath;  [ ] cough, [ ]wheezing, [ ]chills, [ ]hemoptysis  Cardiovascular: [ ] chest pain, [ ]palpitations, [ ]dizziness,  [ ]leg swelling[ ]orthopnea[ ]PND  Gastrointestinal: [ ] abdominal pain, [ ]nausea, [ ]vomiting,  [ ]diarrhea [ ]Constipation [ ]Melena  Genitourinary: [ ] dysuria, [ ] hematuria [ ]Pineda  Neurologic: [ ] headaches [ ] tremors[ ]weakness [ ]Paralysis Right[ ] Left[ ]  Skin: [ ] itching, [ ]burning, [ ] rashes  Endocrine: [ ] heat or cold intolerance  Musculoskeletal: [ ] joint pain or swelling; [ ] muscle, back, or extremity pain  Psychiatric: [ ] depression, [ ]anxiety, [ ]mood swings, or [ ]difficulty sleeping  Hematologic: [ ] easy bruising, [ ] bleeding gums    [x] All remaining systems negative except as per above.   [ ]Unable to obtain.    Vital Signs Last 24 Hrs  T(C): 36.7 (01 Aug 2021 05:36), Max: 36.9 (31 Jul 2021 11:33)  T(F): 98.1 (01 Aug 2021 05:36), Max: 98.5 (31 Jul 2021 11:33)  HR: 74 (01 Aug 2021 05:36) (74 - 86)  BP: 138/73 (01 Aug 2021 05:36) (113/49 - 138/73)  RR: 18 (01 Aug 2021 05:36) (18 - 20)  SpO2: 100% (01 Aug 2021 05:36) (100% - 100%)  I&O's Summary      PHYSICAL EXAM:  General: No acute distress BMI-26  HEENT: EOMI, PERRL  Neck: Supple, [ ] JVD  Lungs: Equal air entry bilaterally; [ ] rales [ ] wheezing [ ] rhonchi  Heart: Regular rate and rhythm; [x ] murmur   2/6 [x ] systolic [ ] diastolic [ ] radiation[ ] rubs [ ]  gallops  Abdomen: Nontender, bowel sounds present  Extremities: No clubbing, cyanosis, [ ] edema [ ]Pulses  equal and intact  Nervous system:  Alert & Oriented X3, no focal deficits  Psychiatric: Normal affect  Skin: No rashes or lesions    LABS:  08-01    139  |  96<L>  |  48<H>  ----------------------------<  157<H>  4.0   |  23  |  9.10<H>    Ca    9.3      01 Aug 2021 06:53  Phos  4.1     07-31  Mg     2.00     07-31    TPro  7.4  /  Alb  4.2  /  TBili  0.4  /  DBili  x   /  AST  13  /  ALT  10  /  AlkPhos  59  07-31    Creatinine Trend: 9.10<--, 6.20<--                        10.0   8.56  )-----------( 132      ( 01 Aug 2021 06:53 )             33.0             RADIOLOGY:  XR CHEST PORTABLE URGENT 1V   IMPRESSION:  Redemonstrated right humeral head metallic suture anchor, peripheral lower lateral left chest wall implanted loop recorder, and partially visualized overlapping left axillary region metallic mesh vascular stents.  Clear lungs. No pleural effusions or pneumothorax.  Stable cardiac and mediastinal silhouettes which are not enlarged.  Trachea midline.  Generalized osteopenia and mild spinal degenerative change again noted.      ECG [my interpretation]:Sinus rhythm at 91 BPM No acute ST T wave abnormalities QTC 492ms    TELEMETRY:Reviewed monitor tracings-Sinus Rhythm    ECHO:Study Date: 9/20/2020  CONCLUSIONS:  1. Aortic valve not well visualized; appears calcified with likely significant stenosis. Peak transaorticvalve gradient equals 30 mm Hg, mean transaortic valve gradient equals 19 mm Hg.  2. Endocardial visualization enhanced with intravenous injection of echo contrast (Definity).       Moderate segmental left ventricular systolic dysfunction. Basal inferior, basal inferolateral, basal inferoseptal wall hypokinesis.  3. Normal right ventricular size and function.    ILR:Implaned 9/22/2020  Programming was as follows:  - Tachycardia: 171 bpm), 16 beats duration  - Bradycardia: >2,000ms   - Pause: >3 seconds  - AF detection: ON      IMPRESSION AND PLAN:  82 yo F w/ AS, HTN, DM2 (on insulin), ESRD on HD MWF via LUE AVF, presenting w/ syncope, admitted to telemetry     Problem/Plan - 1:  ·  Problem: Syncope.  Plan: - monitor on tele, r/o arrhythmia as cause of syncope  - no e/o head trauma on exam  - elevated trop could be in setting of ESRD,  doubt ACS, patient without active chest pain, EKG w/o acute ischemic changes  - per history did not appear to be straining, no reported hypoglycemia  - no abnormal movements, bowel/bladder incontinence to suggest seizure  - received HD earlier on Friday prior to episode, ?related to fluid removal  -Orthostatics  -ILR interrogation    Problem/Plan - 2:  ·  Problem: Aortic stenosis.  Plan: -  - patient and daughter appear to be interested in TAVR if ultimately needed/offered  - on asa/statin/B-blocker.     Problem/Plan - 3:  ·  Problem: ESRD (end stage renal disease) on dialysis.  Plan: - appreciate renal input  - c/w sevelamer  - no urgent need for HD at this time  - c/w routine HD as per renal  - renal restricted diet.     Problem/Plan - 4:  ·  Problem: Type 2 diabetes mellitus with other specified complication, with long-term current use of insulin.  Plan: - patient reports history of multiple episodes of hypoglycemia  -  Problem/Plan - 5:  ·  Problem: Essential hypertension.  Plan: - on toprol xl w/ hold parameters, f/u orthostatics, patient also on HD.     Problem/Plan - 6:  Problem: Refusal of blood transfusions as patient is [employer]. 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    Problem/Plan - 7:  ·  Problem: Need for prophylactic measure.  Plan: DVT ppx: HSQ  Dispo: pending syncope workup, PT eval.

## 2021-08-01 NOTE — PHYSICAL THERAPY INITIAL EVALUATION ADULT - PERTINENT HX OF CURRENT PROBLEM, REHAB EVAL
patient is a 81 year old female admitted for syncope workup. So far patient without CP, EKG without ischemic changes, troponin downtrending, low suspicion of ACS as per chart

## 2021-08-01 NOTE — PHYSICAL THERAPY INITIAL EVALUATION ADULT - ADDITIONAL COMMENTS
patient reports she ambulates with rollator at baseline. she reports multiple falls in the past 6months. orthostatics were assessed to be negative, see flowsheet for values.     patient was left laying comfortable on transport stretcher with transport team to take patient to echo

## 2021-08-01 NOTE — PROGRESS NOTE ADULT - SUBJECTIVE AND OBJECTIVE BOX
SUBJECTIVE / OVERNIGHT EVENTS: pt seen and examined      MEDICATIONS  (STANDING):  aspirin enteric coated 81 milliGRAM(s) Oral daily  atorvastatin 20 milliGRAM(s) Oral at bedtime  dextrose 40% Gel 15 Gram(s) Oral once  dextrose 5%. 1000 milliLiter(s) (50 mL/Hr) IV Continuous <Continuous>  dextrose 5%. 1000 milliLiter(s) (100 mL/Hr) IV Continuous <Continuous>  dextrose 50% Injectable 25 Gram(s) IV Push once  dextrose 50% Injectable 12.5 Gram(s) IV Push once  dextrose 50% Injectable 25 Gram(s) IV Push once  glucagon  Injectable 1 milliGRAM(s) IntraMuscular once  heparin   Injectable 5000 Unit(s) SubCutaneous every 8 hours  insulin lispro (ADMELOG) corrective regimen sliding scale   SubCutaneous three times a day before meals  insulin lispro (ADMELOG) corrective regimen sliding scale   SubCutaneous at bedtime  metoprolol succinate ER 50 milliGRAM(s) Oral daily  sevelamer carbonate 800 milliGRAM(s) Oral three times a day with meals    MEDICATIONS  (PRN):  acetaminophen   Tablet .. 650 milliGRAM(s) Oral every 6 hours PRN Temp greater or equal to 38.5C (101.3F), Mild Pain (1 - 3)  ALBUTerol    90 MICROgram(s) HFA Inhaler 2 Puff(s) Inhalation every 6 hours PRN Shortness of Breath and/or Wheezing  melatonin 3 milliGRAM(s) Oral at bedtime PRN Insomnia  ondansetron Injectable 4 milliGRAM(s) IV Push every 8 hours PRN Nausea and/or Vomiting    Vital Signs Last 24 Hrs  T(C): 36.8 (01 Aug 2021 13:06), Max: 36.8 (01 Aug 2021 13:06)  T(F): 98.2 (01 Aug 2021 13:06), Max: 98.2 (01 Aug 2021 13:06)  HR: 76 (01 Aug 2021 13:06) (74 - 76)  BP: 142/60 (01 Aug 2021 13:06) (138/73 - 142/60)  BP(mean): --  RR: 18 (01 Aug 2021 13:06) (18 - 18)  SpO2: 100% (01 Aug 2021 13:06) (100% - 100%)    CAPILLARY BLOOD GLUCOSE      POCT Blood Glucose.: 166 mg/dL (01 Aug 2021 22:08)  POCT Blood Glucose.: 151 mg/dL (01 Aug 2021 17:00)  POCT Blood Glucose.: 136 mg/dL (01 Aug 2021 12:52)  POCT Blood Glucose.: 167 mg/dL (01 Aug 2021 08:13)    I&O's Summary      Constitutional: No fever, fatigue  Skin: No rash.  Eyes: No recent vision problems or eye pain.  ENT: No congestion, ear pain, or sore throat.  Cardiovascular: No chest pain or palpation.  Respiratory: No cough, shortness of breath, congestion, or wheezing.  Gastrointestinal: No abdominal pain, nausea, vomiting, or diarrhea.  Genitourinary: No dysuria.  Musculoskeletal: No joint swelling.  Neurologic: No headache.    PHYSICAL EXAM:  GENERAL: NAD  EYES: EOMI, PERRLA  NECK: Supple, No JVD  CHEST/LUNG: dec breath sounds at bases  HEART:  S1 , S2 +  ABDOMEN: soft , bs+  EXTREMITIES:  no edema  NEUROLOGY:alert awake      LABS:                        10.0   8.56  )-----------( 132      ( 01 Aug 2021 06:53 )             33.0     08-01    139  |  96<L>  |  48<H>  ----------------------------<  157<H>  4.0   |  23  |  9.10<H>    Ca    9.3      01 Aug 2021 06:53  Phos  4.1     07-31  Mg     2.00     07-31    TPro  7.4  /  Alb  4.2  /  TBili  0.4  /  DBili  x   /  AST  13  /  ALT  10  /  AlkPhos  59  07-31              RADIOLOGY & ADDITIONAL TESTS:    Imaging Personally Reviewed:    Consultant(s) Notes Reviewed:      Care Discussed with Consultants/Other Providers:

## 2021-08-02 LAB
ANION GAP SERPL CALC-SCNC: 20 MMOL/L — HIGH (ref 7–14)
BUN SERPL-MCNC: 75 MG/DL — HIGH (ref 7–23)
CALCIUM SERPL-MCNC: 9.2 MG/DL — SIGNIFICANT CHANGE UP (ref 8.4–10.5)
CHLORIDE SERPL-SCNC: 94 MMOL/L — LOW (ref 98–107)
CO2 SERPL-SCNC: 21 MMOL/L — LOW (ref 22–31)
CREAT SERPL-MCNC: 11.6 MG/DL — HIGH (ref 0.5–1.3)
GLUCOSE BLDC GLUCOMTR-MCNC: 127 MG/DL — HIGH (ref 70–99)
GLUCOSE BLDC GLUCOMTR-MCNC: 161 MG/DL — HIGH (ref 70–99)
GLUCOSE BLDC GLUCOMTR-MCNC: 181 MG/DL — HIGH (ref 70–99)
GLUCOSE BLDC GLUCOMTR-MCNC: 222 MG/DL — HIGH (ref 70–99)
GLUCOSE SERPL-MCNC: 160 MG/DL — HIGH (ref 70–99)
HCT VFR BLD CALC: 29.7 % — LOW (ref 34.5–45)
HGB BLD-MCNC: 9.3 G/DL — LOW (ref 11.5–15.5)
MCHC RBC-ENTMCNC: 29.1 PG — SIGNIFICANT CHANGE UP (ref 27–34)
MCHC RBC-ENTMCNC: 31.3 GM/DL — LOW (ref 32–36)
MCV RBC AUTO: 92.8 FL — SIGNIFICANT CHANGE UP (ref 80–100)
NRBC # BLD: 0 /100 WBCS — SIGNIFICANT CHANGE UP
NRBC # FLD: 0 K/UL — SIGNIFICANT CHANGE UP
PLATELET # BLD AUTO: 158 K/UL — SIGNIFICANT CHANGE UP (ref 150–400)
POTASSIUM SERPL-MCNC: 4.3 MMOL/L — SIGNIFICANT CHANGE UP (ref 3.5–5.3)
POTASSIUM SERPL-SCNC: 4.3 MMOL/L — SIGNIFICANT CHANGE UP (ref 3.5–5.3)
RBC # BLD: 3.2 M/UL — LOW (ref 3.8–5.2)
RBC # FLD: 18 % — HIGH (ref 10.3–14.5)
SODIUM SERPL-SCNC: 135 MMOL/L — SIGNIFICANT CHANGE UP (ref 135–145)
WBC # BLD: 7.41 K/UL — SIGNIFICANT CHANGE UP (ref 3.8–10.5)
WBC # FLD AUTO: 7.41 K/UL — SIGNIFICANT CHANGE UP (ref 3.8–10.5)

## 2021-08-02 PROCEDURE — 93291 INTERROG DEV EVAL SCRMS IP: CPT | Mod: 26

## 2021-08-02 RX ORDER — CHLORHEXIDINE GLUCONATE 213 G/1000ML
1 SOLUTION TOPICAL DAILY
Refills: 0 | Status: DISCONTINUED | OUTPATIENT
Start: 2021-08-02 | End: 2021-08-03

## 2021-08-02 RX ADMIN — CHLORHEXIDINE GLUCONATE 1 APPLICATION(S): 213 SOLUTION TOPICAL at 19:01

## 2021-08-02 RX ADMIN — SEVELAMER CARBONATE 800 MILLIGRAM(S): 2400 POWDER, FOR SUSPENSION ORAL at 09:13

## 2021-08-02 RX ADMIN — HEPARIN SODIUM 5000 UNIT(S): 5000 INJECTION INTRAVENOUS; SUBCUTANEOUS at 20:48

## 2021-08-02 RX ADMIN — HEPARIN SODIUM 5000 UNIT(S): 5000 INJECTION INTRAVENOUS; SUBCUTANEOUS at 14:46

## 2021-08-02 RX ADMIN — HEPARIN SODIUM 5000 UNIT(S): 5000 INJECTION INTRAVENOUS; SUBCUTANEOUS at 06:17

## 2021-08-02 RX ADMIN — SEVELAMER CARBONATE 800 MILLIGRAM(S): 2400 POWDER, FOR SUSPENSION ORAL at 19:01

## 2021-08-02 RX ADMIN — Medication 1: at 09:12

## 2021-08-02 RX ADMIN — Medication 50 MILLIGRAM(S): at 06:17

## 2021-08-02 RX ADMIN — ATORVASTATIN CALCIUM 20 MILLIGRAM(S): 80 TABLET, FILM COATED ORAL at 20:48

## 2021-08-02 RX ADMIN — Medication 81 MILLIGRAM(S): at 11:40

## 2021-08-02 RX ADMIN — SEVELAMER CARBONATE 800 MILLIGRAM(S): 2400 POWDER, FOR SUSPENSION ORAL at 13:01

## 2021-08-02 RX ADMIN — Medication 1: at 11:43

## 2021-08-02 NOTE — PROGRESS NOTE ADULT - PROBLEM SELECTOR PLAN 7
DVT ppx: HSQ  Dispo: pending syncope workup, PT eval
DVT ppx: HSQ  Dispo: pending syncope workup, PT eval

## 2021-08-02 NOTE — PROGRESS NOTE ADULT - SUBJECTIVE AND OBJECTIVE BOX
[facility] - S Suzi / Hieu S /D Daniel/ ZAIRA Melton/ S Piedad/ Aydin Greene / AKIKO Baldwinu/ O Yumiko  service [phone], office [phone]  ---------------------------------------------------------------------------------------------------------------    Patient seen and examined bedside    Subjective and Objective: No overnight events, sob resolved. No complaints today. feeling better    Allergies: No Known Allergies      Hospital Medications:   MEDICATIONS  (STANDING):  aspirin enteric coated 81 milliGRAM(s) Oral daily  atorvastatin 20 milliGRAM(s) Oral at bedtime  dextrose 40% Gel 15 Gram(s) Oral once  dextrose 5%. 1000 milliLiter(s) (50 mL/Hr) IV Continuous <Continuous>  dextrose 5%. 1000 milliLiter(s) (100 mL/Hr) IV Continuous <Continuous>  dextrose 50% Injectable 25 Gram(s) IV Push once  dextrose 50% Injectable 12.5 Gram(s) IV Push once  dextrose 50% Injectable 25 Gram(s) IV Push once  glucagon  Injectable 1 milliGRAM(s) IntraMuscular once  heparin   Injectable 5000 Unit(s) SubCutaneous every 8 hours  insulin lispro (ADMELOG) corrective regimen sliding scale   SubCutaneous three times a day before meals  insulin lispro (ADMELOG) corrective regimen sliding scale   SubCutaneous at bedtime  metoprolol succinate ER 50 milliGRAM(s) Oral daily  sevelamer carbonate 800 milliGRAM(s) Oral three times a day with meals      REVIEW OF SYSTEMS:  CONSTITUTIONAL: No weakness, fevers or chills  EYES/ENT: No visual changes;  No vertigo or throat pain   NECK: No pain or stiffness  RESPIRATORY: No cough, wheezing, hemoptysis; No shortness of breath  CARDIOVASCULAR: No chest pain or palpitations.  GASTROINTESTINAL: No abdominal or epigastric pain. No nausea, vomiting, or hematemesis; No diarrhea or constipation. No melena or hematochezia.  GENITOURINARY: No dysuria, frequency, foamy urine, urinary urgency, incontinence or hematuria  NEUROLOGICAL: No numbness or weakness  SKIN: No itching, burning, rashes, or lesions   VASCULAR: No bilateral lower extremity edema.   All other review of systems is negative unless indicated above.    VITALS:  T(F): 98.5 (08-02-21 @ 14:02), Max: 98.5 (08-02-21 @ 14:02)  HR: 79 (08-02-21 @ 14:02)  BP: 148/64 (08-02-21 @ 14:02)  RR: 17 (08-02-21 @ 14:02)  SpO2: 100% (08-02-21 @ 14:02)  Wt(kg): --        PHYSICAL EXAM:  Constitutional: NAD  HEENT: anicteric sclera, oropharynx clear  Neck: No JVD  Respiratory: CTAB, no wheezes, rales or rhonchi  Cardiovascular: S1, S2, RRR  Gastrointestinal: BS+, soft, NT/ND  Extremities: No cyanosis or clubbing. No peripheral edema  Neurological: A/O x 3, no focal deficits  Psychiatric: Normal mood, normal affect  : No CVA tenderness. No recinos.   Skin: No rashes  Vascular Access:    LABS:  08-01    139  |  96<L>  |  48<H>  ----------------------------<  157<H>  4.0   |  23  |  9.10<H>    Ca    9.3      01 Aug 2021 06:53      Creatinine Trend: 9.10 <--, 6.20 <--                        10.0   8.56  )-----------( 132      ( 01 Aug 2021 06:53 )             33.0     Urine Studies:        RADIOLOGY & ADDITIONAL STUDIES:   [facility] - S Suzi / Hieu S /D Daniel/ S Linh/ S Piedad/ Aydin Greene / AKIKO Villalba/ O Yumiko  service -[phone], office [phone]  ---------------------------------------------------------------------------------------------------------------    Patient seen and examined bedside    Subjective and Objective: No overnight events, denied sob. No complaints today.     Allergies: No Known Allergies      Hospital Medications:   MEDICATIONS  (STANDING):  aspirin enteric coated 81 milliGRAM(s) Oral daily  atorvastatin 20 milliGRAM(s) Oral at bedtime  dextrose 40% Gel 15 Gram(s) Oral once  dextrose 5%. 1000 milliLiter(s) (50 mL/Hr) IV Continuous <Continuous>  dextrose 5%. 1000 milliLiter(s) (100 mL/Hr) IV Continuous <Continuous>  dextrose 50% Injectable 25 Gram(s) IV Push once  dextrose 50% Injectable 12.5 Gram(s) IV Push once  dextrose 50% Injectable 25 Gram(s) IV Push once  glucagon  Injectable 1 milliGRAM(s) IntraMuscular once  heparin   Injectable 5000 Unit(s) SubCutaneous every 8 hours  insulin lispro (ADMELOG) corrective regimen sliding scale   SubCutaneous three times a day before meals  insulin lispro (ADMELOG) corrective regimen sliding scale   SubCutaneous at bedtime  metoprolol succinate ER 50 milliGRAM(s) Oral daily  sevelamer carbonate 800 milliGRAM(s) Oral three times a day with meals    VITALS:  T(F): 98.5 (08-02-21 @ 14:02), Max: 98.5 (08-02-21 @ 14:02)  HR: 79 (08-02-21 @ 14:02)  BP: 148/64 (08-02-21 @ 14:02)  RR: 17 (08-02-21 @ 14:02)  SpO2: 100% (08-02-21 @ 14:02)  Wt(kg): --    PHYSICAL EXAM:  Constitutional: NAD  HEENT: anicteric sclera  Neck: No JVD  Respiratory: CTAB, no wheezes, rales or rhonchi  Cardiovascular: S1, S2, RRR  Gastrointestinal: BS+, soft, NT/ND  Extremities: No peripheral edema  Neurological: A/O x 3  Psychiatric: Normal mood, normal affect  : No recinos.   Vascular Access: avf +thrill     LABS:  08-01    139  |  96<L>  |  48<H>  ----------------------------<  157<H>  4.0   |  23  |  9.10<H>    Ca    9.3      01 Aug 2021 06:53      Creatinine Trend: 9.10 <--, 6.20 <--                        10.0   8.56  )-----------( 132      ( 01 Aug 2021 06:53 )             33.0     Urine Studies:        RADIOLOGY & ADDITIONAL STUDIES:

## 2021-08-02 NOTE — PROGRESS NOTE ADULT - PROBLEM SELECTOR PLAN 1
- monitor on tele, r/o arrhythmia as cause of syncope  - obtain echo to evaluate valves/function especially given hx of AS  - no e/o head trauma on exam  - elevated trop could be in setting of ESRD, can repeat, doubt ACS, patient without active chest pain, EKG w/o acute ischemic changes  - per history did not appear to be straining, no reported hypoglycemia  - no abnormal movements, bowel/bladder incontinence to suggest seizure  - received HD earlier on Friday prior to episode, ?related to fluid removal, could check orthostatics as well   - f/u cardiology evaluation
- monitor on tele, r/o arrhythmia as cause of syncope  - obtain echo to evaluate valves/function especially given hx of AS  - no e/o head trauma on exam  - elevated trop could be in setting of ESRD, can repeat, doubt ACS, patient without active chest pain, EKG w/o acute ischemic changes  - per history did not appear to be straining, no reported hypoglycemia  - no abnormal movements, bowel/bladder incontinence to suggest seizure  - received HD earlier on Friday prior to episode, ?related to fluid removal, could check orthostatics as well   - f/u cardiology evaluation

## 2021-08-02 NOTE — PROGRESS NOTE ADULT - PROBLEM SELECTOR PROBLEM 6
Refusal of blood transfusions as patient is Quaker
Refusal of blood transfusions as patient is Druze

## 2021-08-02 NOTE — PROGRESS NOTE ADULT - PROBLEM SELECTOR PLAN 3
- appreciate renal input  - c/w sevelamer  - no urgent need for HD at this time  - c/w routine HD as per renal  - renal restricted diet
- appreciate renal input  - c/w sevelamer  - no urgent need for HD at this time  - c/w routine HD as per renal  - renal restricted diet

## 2021-08-02 NOTE — PROGRESS NOTE ADULT - PROBLEM SELECTOR PLAN 5
- on toprol xl w/ hold parameters, f/u orthostatics, patient also on HD
- on toprol xl w/ hold parameters, f/u orthostatics, patient also on HD

## 2021-08-02 NOTE — PROGRESS NOTE ADULT - PROBLEM SELECTOR PLAN 6
- anemia, does not require pRBC transfusion at this time, is [place] so would refuse them anyway  - hgb currently appears at goal, f/u renal re: role for AAKASH with HD (patient reports getting something with HD as outpatient for her anemia)
- anemia, does not require pRBC transfusion at this time, is Oriental orthodox so would refuse them anyway  - hgb currently appears at goal, f/u renal re: role for AAKASH with HD (patient reports getting something with HD as outpatient for her anemia)

## 2021-08-02 NOTE — PROGRESS NOTE ADULT - SUBJECTIVE AND OBJECTIVE BOX
SUBJECTIVE / OVERNIGHT EVENTS: pt seen and examined    MEDICATIONS  (STANDING):  aspirin enteric coated 81 milliGRAM(s) Oral daily  atorvastatin 20 milliGRAM(s) Oral at bedtime  chlorhexidine 4% Liquid 1 Application(s) Topical daily  dextrose 40% Gel 15 Gram(s) Oral once  dextrose 5%. 1000 milliLiter(s) (50 mL/Hr) IV Continuous <Continuous>  dextrose 5%. 1000 milliLiter(s) (100 mL/Hr) IV Continuous <Continuous>  dextrose 50% Injectable 25 Gram(s) IV Push once  dextrose 50% Injectable 12.5 Gram(s) IV Push once  dextrose 50% Injectable 25 Gram(s) IV Push once  glucagon  Injectable 1 milliGRAM(s) IntraMuscular once  heparin   Injectable 5000 Unit(s) SubCutaneous every 8 hours  insulin lispro (ADMELOG) corrective regimen sliding scale   SubCutaneous three times a day before meals  insulin lispro (ADMELOG) corrective regimen sliding scale   SubCutaneous at bedtime  metoprolol succinate ER 50 milliGRAM(s) Oral daily  sevelamer carbonate 800 milliGRAM(s) Oral three times a day with meals    MEDICATIONS  (PRN):  acetaminophen   Tablet .. 650 milliGRAM(s) Oral every 6 hours PRN Temp greater or equal to 38.5C (101.3F), Mild Pain (1 - 3)  ALBUTerol    90 MICROgram(s) HFA Inhaler 2 Puff(s) Inhalation every 6 hours PRN Shortness of Breath and/or Wheezing  melatonin 3 milliGRAM(s) Oral at bedtime PRN Insomnia  ondansetron Injectable 4 milliGRAM(s) IV Push every 8 hours PRN Nausea and/or Vomiting    Vital Signs Last 24 Hrs  T(C): 36.7 (02 Aug 2021 21:14), Max: 36.9 (02 Aug 2021 14:02)  T(F): 98 (02 Aug 2021 21:14), Max: 98.5 (02 Aug 2021 14:02)  HR: 86 (02 Aug 2021 21:14) (69 - 86)  BP: 151/55 (02 Aug 2021 21:14) (143/73 - 156/80)  BP(mean): 79 (02 Aug 2021 14:02) (79 - 79)  RR: 17 (02 Aug 2021 21:14) (17 - 18)  SpO2: 100% (02 Aug 2021 21:14) (100% - 100%)    Constitutional: No fever, fatigue  Skin: No rash.  Eyes: No recent vision problems or eye pain.  ENT: No congestion, ear pain, or sore throat.  Cardiovascular: No chest pain or palpation.  Respiratory: No cough, shortness of breath, congestion, or wheezing.  Gastrointestinal: No abdominal pain, nausea, vomiting, or diarrhea.  Genitourinary: No dysuria.  Musculoskeletal: No joint swelling.  Neurologic: No headache.    PHYSICAL EXAM:  GENERAL: NAD  EYES: EOMI, PERRLA  NECK: Supple, No JVD  CHEST/LUNG: dec breath sounds at bases  HEART:  S1 , S2 +  ABDOMEN: soft , bs+  EXTREMITIES:  no edema  NEUROLOGY:alert awake    LABS:  08-02    135  |  94<L>  |  75<H>  ----------------------------<  160<H>  4.3   |  21<L>  |  11.60<H>    Ca    9.2      02 Aug 2021 16:17      Creatinine Trend: 11.60 <--, 9.10 <--, 6.20 <--                        9.3    7.41  )-----------( 158      ( 02 Aug 2021 16:17 )             29.7     Urine Studies:                  RADIOLOGY & ADDITIONAL TESTS:    Imaging Personally Reviewed:    Consultant(s) Notes Reviewed:      Care Discussed with Consultants/Other Providers:

## 2021-08-03 ENCOUNTER — TRANSCRIPTION ENCOUNTER (OUTPATIENT)
Age: 81
End: 2021-08-03

## 2021-08-03 VITALS
TEMPERATURE: 98 F | RESPIRATION RATE: 16 BRPM | SYSTOLIC BLOOD PRESSURE: 144 MMHG | OXYGEN SATURATION: 100 % | DIASTOLIC BLOOD PRESSURE: 64 MMHG | HEART RATE: 84 BPM

## 2021-08-03 LAB
GLUCOSE BLDC GLUCOMTR-MCNC: 130 MG/DL — HIGH (ref 70–99)
GLUCOSE BLDC GLUCOMTR-MCNC: 153 MG/DL — HIGH (ref 70–99)
GLUCOSE BLDC GLUCOMTR-MCNC: 165 MG/DL — HIGH (ref 70–99)
MRSA PCR RESULT.: SIGNIFICANT CHANGE UP
S AUREUS DNA NOSE QL NAA+PROBE: SIGNIFICANT CHANGE UP

## 2021-08-03 RX ORDER — INSULIN ASPART 100 [IU]/ML
10 INJECTION, SOLUTION SUBCUTANEOUS
Qty: 0 | Refills: 0 | DISCHARGE

## 2021-08-03 RX ORDER — INSULIN DETEMIR 100/ML (3)
0 INSULIN PEN (ML) SUBCUTANEOUS
Qty: 0 | Refills: 0 | DISCHARGE

## 2021-08-03 RX ADMIN — Medication 1: at 13:19

## 2021-08-03 RX ADMIN — Medication 650 MILLIGRAM(S): at 09:04

## 2021-08-03 RX ADMIN — Medication 1: at 09:15

## 2021-08-03 RX ADMIN — Medication 50 MILLIGRAM(S): at 05:45

## 2021-08-03 RX ADMIN — CHLORHEXIDINE GLUCONATE 1 APPLICATION(S): 213 SOLUTION TOPICAL at 13:19

## 2021-08-03 RX ADMIN — HEPARIN SODIUM 5000 UNIT(S): 5000 INJECTION INTRAVENOUS; SUBCUTANEOUS at 13:18

## 2021-08-03 RX ADMIN — SEVELAMER CARBONATE 800 MILLIGRAM(S): 2400 POWDER, FOR SUSPENSION ORAL at 13:18

## 2021-08-03 RX ADMIN — HEPARIN SODIUM 5000 UNIT(S): 5000 INJECTION INTRAVENOUS; SUBCUTANEOUS at 05:45

## 2021-08-03 RX ADMIN — SEVELAMER CARBONATE 800 MILLIGRAM(S): 2400 POWDER, FOR SUSPENSION ORAL at 09:04

## 2021-08-03 NOTE — DISCHARGE NOTE PROVIDER - NSDCCPCAREPLAN_GEN_ALL_CORE_FT
PRINCIPAL DISCHARGE DIAGNOSIS  Diagnosis: Syncope  Assessment and Plan of Treatment: you were admitted for a syncopal episode, you loop was interrogated cih showed no evidence of event which could have caused episode. Your echocardiogram showed  clcified aortic valve, you will need to follow up with your Cardiologist      SECONDARY DISCHARGE DIAGNOSES  Diagnosis: Aortic stenosis  Assessment and Plan of Treatment: Please follow up with your Cardiologist for possivel TAVR    Diagnosis: Type 2 diabetes mellitus with other specified complication, with long-term current use of insulin  Assessment and Plan of Treatment: your A1C 6.6, follow up with your PMD, you are current not on any diabetic medications    Diagnosis: ESRD (end stage renal disease) on dialysis  Assessment and Plan of Treatment: resume your hemodialysis sessions in the community and follow up with your nephrologist     PRINCIPAL DISCHARGE DIAGNOSIS  Diagnosis: Syncope  Assessment and Plan of Treatment: you were admitted for a syncopal episode, you loop was interrogated cih showed no evidence of event which could have caused episode. Your echocardiogram showed  clcified aortic valve, you will need to follow up with your Cardiologist      SECONDARY DISCHARGE DIAGNOSES  Diagnosis: Aortic stenosis  Assessment and Plan of Treatment: Please follow up with your Cardiologist for possivel TAVR    Diagnosis: Type 2 diabetes mellitus with other specified complication, with long-term current use of insulin  Assessment and Plan of Treatment: your A1C 6.6, follow up with your PMD, check your fingerstick daily and keep a log to show your PMD, do not continmeu to take your insulin without further instruction for your PMD.    Diagnosis: ESRD (end stage renal disease) on dialysis  Assessment and Plan of Treatment: resume your hemodialysis sessions in the community and follow up with your nephrologist

## 2021-08-03 NOTE — DISCHARGE NOTE PROVIDER - PROVIDER TOKENS
PROVIDER:[TOKEN:[[id number]],FOLLOWUP:[1 week]],PROVIDER:[TOKEN:[[id number]],FOLLOWUP:[1 week]]

## 2021-08-03 NOTE — PROGRESS NOTE ADULT - ASSESSMENT
82yo F PMHx of Aortic stenosis, DM, ESRD on dialysis at [facility] via LUE AVF presenting for syncope and fall from standing height. Renal following for ESRD Mx    ESRD on HD [facility]  Lytes and volume acceptable  due for HD tomorrow  H/H at goal  Phos WNL, continue binders  Renal diet  HTN, controlled. c/w BB  stable for DC from Renal perspective      labs, chart reviewed  For any question, call:  Cell # [phone]  Pager # [phone]  Callback # [phone]    
80yo F PMHx of Aortic stenosis, DM, ESRD on dialysis at [facility] via LUE AVF presenting for syncope and fall from standing height. Renal following for ESRD Mx    ESRD on HD [facility]  Lytes and volume acceptable  scheduled for HD today w/2k bath uf 1.5kg as tolearted  H/H at goal  Phos WNL, continue binders  Renal diet  HTN, controlled. c/w BB    labs, chart reviewed  For any question, call:  Cell # [phone]  service# [phone]  office# [phone]  
80 yo F w/ AS, HTN, DM2 (on insulin), ESRD on HD MWF via LUE AVF, presenting w/ syncope, admitted to tele for further cardiac workup. 
82 yo F w/ AS, HTN, DM2 (on insulin), ESRD on HD MWF via LUE AVF, presenting w/ syncope, admitted to tele for further cardiac workup.

## 2021-08-03 NOTE — DISCHARGE NOTE NURSING/CASE MANAGEMENT/SOCIAL WORK - PATIENT PORTAL LINK FT
You can access the FollowMyHealth Patient Portal offered by [facility] by registering at the following website: [email or web address]. By joining [employer]’s FollowMyHealth portal, you will also be able to view your health information using other applications (apps) compatible with our system.

## 2021-08-03 NOTE — PROGRESS NOTE ADULT - SUBJECTIVE AND OBJECTIVE BOX
[facility] - CHRISTELLE Archer / CHRISTELLE Hagen / DARLYN Sanchez/ CHRISTELLE Melton/ CHRISTELLE Herbert/ HANS Greene / CARLITO Villalab / JORDAN Calderon  ---------------------------------------------------------------------------------------------------------------  seen and examined today for ESRD  Interval : NAD  VITALS:  T(F): 98.5 (08-03-21 @ 05:40), Max: 98.5 (08-03-21 @ 05:40)  HR: 84 (08-03-21 @ 05:40)  BP: 144/64 (08-03-21 @ 05:40)  RR: 16 (08-03-21 @ 05:40)  SpO2: 100% (08-03-21 @ 05:40)  Wt(kg): --    08-02 @ 07:01  -  08-03 @ 07:00  --------------------------------------------------------  IN: 520 mL / OUT: 1900 mL / NET: -1380 mL      Physical Exam :-  Constitutional: NAD  Neck: Supple.  Respiratory: Bilateral equal breath sounds,  Cardiovascular: S1, S2 normal,  Gastrointestinal: Bowel Sounds present, soft, non tender.  Extremities: No edema  Neurological: Alert and Oriented x 3, no focal deficits  Psychiatric: Normal mood, normal affect  Data:-  Allergies :   No Known Allergies    Hospital Medications:   MEDICATIONS  (STANDING):  aspirin enteric coated 81 milliGRAM(s) Oral daily  atorvastatin 20 milliGRAM(s) Oral at bedtime  chlorhexidine 4% Liquid 1 Application(s) Topical daily  dextrose 40% Gel 15 Gram(s) Oral once  dextrose 5%. 1000 milliLiter(s) (50 mL/Hr) IV Continuous <Continuous>  dextrose 5%. 1000 milliLiter(s) (100 mL/Hr) IV Continuous <Continuous>  dextrose 50% Injectable 25 Gram(s) IV Push once  dextrose 50% Injectable 12.5 Gram(s) IV Push once  dextrose 50% Injectable 25 Gram(s) IV Push once  glucagon  Injectable 1 milliGRAM(s) IntraMuscular once  heparin   Injectable 5000 Unit(s) SubCutaneous every 8 hours  insulin lispro (ADMELOG) corrective regimen sliding scale   SubCutaneous three times a day before meals  insulin lispro (ADMELOG) corrective regimen sliding scale   SubCutaneous at bedtime  metoprolol succinate ER 50 milliGRAM(s) Oral daily  sevelamer carbonate 800 milliGRAM(s) Oral three times a day with meals    08-02    135  |  94<L>  |  75<H>  ----------------------------<  160<H>  4.3   |  21<L>  |  11.60<H>    Ca    9.2      02 Aug 2021 16:17      Creatinine Trend: 11.60 <--, 9.10 <--, 6.20 <--                        9.3    7.41  )-----------( 158      ( 02 Aug 2021 16:17 )             29.7

## 2021-08-03 NOTE — DISCHARGE NOTE PROVIDER - CARE PROVIDER_API CALL
mAol Joshi)  Cardiology  [address] [place] [address]  Phone: [phone]  Fax: [phone]  Follow Up Time: 1 week    Estefanía Archer  INTERNAL MEDICINE  [address] [place] [address]  Phone: [phone]  Fax: [phone]  Follow Up Time: 1 week

## 2021-08-03 NOTE — DISCHARGE NOTE PROVIDER - NSDCMRMEDTOKEN_GEN_ALL_CORE_FT
aspirin 81 mg oral tablet: 1 tab(s) orally once a day  atorvastatin 20 mg oral tablet: 1 tab(s) orally once a day  Levemir 100 units/mL subcutaneous solution: daughter thinks patient also takes levemir but is unsure of the dose  Metoprolol Succinate ER 50 mg oral tablet, extended release: 1 tab(s) orally once a day  NovoLOG FlexPen 100 units/mL injectable solution: 10 unit(s) injectable 3 times a day (before meals), As Needed for blood sugar levels &gt;150  ProAir HFA 90 mcg/inh inhalation aerosol: 2 puff(s) inhaled every 6 hours, As Needed  sevelamer carbonate 800 mg oral tablet: 1 tab(s) orally 3 times a day (with meals)   aspirin 81 mg oral tablet: 1 tab(s) orally once a day  atorvastatin 20 mg oral tablet: 1 tab(s) orally once a day  Metoprolol Succinate ER 50 mg oral tablet, extended release: 1 tab(s) orally once a day  ProAir HFA 90 mcg/inh inhalation aerosol: 2 puff(s) inhaled every 6 hours, As Needed  sevelamer carbonate 800 mg oral tablet: 1 tab(s) orally 3 times a day (with meals)

## 2021-08-03 NOTE — DISCHARGE NOTE PROVIDER - HOSPITAL COURSE
80yo F PMHx of Aortic stenosis, DM, ESRD on dialysis presenting for syncope and fall from standing height. Pts daughter found her slumped over on bathroom toilet not on floor, denies LOC, tongue biting, incontinence.    Syncope.  - monitor on tele, r/o arrhythmia as cause of syncope  - obtain echo to evaluate valves/function especially given hx of AS  - no e/o head trauma on exam  - elevated trop could be in setting of ESRD, can repeat, doubt ACS, patient without active chest pain, EKG w/o acute ischemic changes  - per history did not appear to be straining, no reported hypoglycemia  - no abnormal movements, bowel/bladder incontinence to suggest seizure  - received HD earlier on Friday prior to episode, ?related to fluid removal, could check orthostatics as well   - f/u cardiology evaluation.     Aortic stenosis  - f/u cardiology evaluation, patient seen by Dr. Joshi on prior admission  - patient and daughter appear to be interested in TAVR if ultimately needed/offered  - on asa/statin/B-blocker.   - 8/1/2021 TTE: Mitral annular calcification, otherwise normal mitral valve. Calcified aortic valve with decreased opening. Incomplete doppelr interrogation precludes accurate assessment of LEVI; the valve appears to be severely stenotic. Endocardium not well visualized; grossly overall normal left ventricular systolic function. In some views, the basal myocardial segments appear hypokinetic. Endocardial visualization enhanced with intravenous injection of echo contrast (Definity). 4. Normal right ventricular size and function.    ESRD on dialysis  - appreciate renal input  - c/w sevelamer  - no urgent need for HD at this time  - c/w routine HD as per renal  - renal restricted diet.     Type 2 diabetes mellitus with other specified complication, with long-term current use of insulin  - patient reports history of multiple episodes of hypoglycemia  - daughter unclear if patient taking levemir at home, reports taking novolog prn  - will cover with sliding scale insulin for now, monitor FS and adjust regimen as needed, wish to avoid episodes of hypoglycemia.     Essential hypertension  - on toprol xl w/ hold parameters, f/u orthostatics, patient also on HD.     Refusal of blood transfusions as patient is Hinduism  - anemia, does not require pRBC transfusion at this time, is Religious so would refuse them anyway  - hgb currently appears at goal, f/u renal re: role for AAKASH with HD (patient reports getting something with HD as outpatient for her anemia).    LLE pain   US: neg for dvt    82yo F PMHx of Aortic stenosis, DM, ESRD on dialysis presenting for syncope and fall from standing height. Pts daughter found her slumped over on bathroom toilet not on floor, denies LOC, tongue biting, incontinence.    Syncope.  - monitored on tele, r/o for any arrhythmias as cause of syncope, NSR w/ PACs   - TTE: calcified aortic valve w/ decreased opening   - no e/o head trauma on exam  - elevated trop could be in setting of ESRD, can repeat, doubt ACS, patient without active chest pain, EKG w/o acute ischemic changes  - per history did not appear to be straining, no reported hypoglycemia  - no abnormal movements, bowel/bladder incontinence to suggest seizure  - received HD earlier on Friday prior to episode, ?related to fluid removal,  orthostatics neg    Aortic stenosis  - Patient seen by Dr. Joshi on prior admission  - patient and daughter appear to be interested in TAVR if ultimately needed/offered  - on asa/statin/B-blocker.   - 8/1/2021 TTE: Mitral annular calcification, otherwise normal mitral valve. Calcified aortic valve with decreased opening. Incomplete doppelr interrogation precludes accurate assessment of LEVI; the valve appears to be severely stenotic. Endocardium not well visualized; grossly overall normal left ventricular systolic function. In some views, the basal myocardial segments appear hypokinetic. Endocardial visualization enhanced with intravenous injection of echo contrast (Definity). 4. Normal right ventricular size and function.    ESRD on dialysis  - appreciate renal input  - c/w sevelamer  - c/w routine HD as per renal  - renal restricted diet.     Type 2 diabetes mellitus with other specified complication, with long-term current use of insulin  - patient reports history of multiple episodes of hypoglycemia  - daughter unclear if patient taking levemir at home, reports taking novolog prn  - will cover with sliding scale insulin for now, monitor FS and adjust regimen as needed, wish to avoid episodes of hypoglycemia.   - As per community pharmacy [facility] patient is not on any diabetic medications at the moment .     Essential hypertension  - on toprol xl w/ hold parameters    Refusal of blood transfusions as patient is Latter-day  - anemia, does not require pRBC transfusion at this time, is Sikh so would refuse them anyway  - hgb currently appears at goal, f/u renal re: role for AAKASH with HD (patient reports getting something with HD as outpatient for her anemia).    LLE pain   US: neg for dvt     Discussed with Dr. Villarreal and Dr. Joshi patient medically cleared for discharge to rehab today 8/3.

## 2021-08-04 PROBLEM — I35.0 NONRHEUMATIC AORTIC (VALVE) STENOSIS: Chronic | Status: ACTIVE | Noted: 2020-09-18

## 2021-08-04 PROBLEM — E11.9 TYPE 2 DIABETES MELLITUS WITHOUT COMPLICATIONS: Chronic | Status: ACTIVE | Noted: 2020-09-18

## 2021-08-24 ENCOUNTER — APPOINTMENT (OUTPATIENT)
Dept: ELECTROPHYSIOLOGY | Facility: CLINIC | Age: 81
End: 2021-08-24
Payer: MEDICARE

## 2021-08-24 ENCOUNTER — NON-APPOINTMENT (OUTPATIENT)
Age: 81
End: 2021-08-24

## 2021-08-24 PROCEDURE — G2066: CPT

## 2021-08-24 PROCEDURE — 93298 REM INTERROG DEV EVAL SCRMS: CPT

## 2021-08-24 NOTE — PROGRESS NOTE ADULT - PROBLEM SELECTOR PROBLEM 3
ESRD (end stage renal disease) on dialysis
ESRD (end stage renal disease) on dialysis
(0) No abnormality

## 2021-09-23 ENCOUNTER — NON-APPOINTMENT (OUTPATIENT)
Age: 81
End: 2021-09-23

## 2021-09-23 ENCOUNTER — APPOINTMENT (OUTPATIENT)
Dept: ELECTROPHYSIOLOGY | Facility: CLINIC | Age: 81
End: 2021-09-23
Payer: MEDICARE

## 2021-09-23 PROCEDURE — G2066: CPT

## 2021-09-23 PROCEDURE — 93298 REM INTERROG DEV EVAL SCRMS: CPT

## 2021-10-13 ENCOUNTER — NON-APPOINTMENT (OUTPATIENT)
Age: 81
End: 2021-10-13

## 2021-10-29 ENCOUNTER — NON-APPOINTMENT (OUTPATIENT)
Age: 81
End: 2021-10-29

## 2021-10-29 ENCOUNTER — APPOINTMENT (OUTPATIENT)
Dept: ELECTROPHYSIOLOGY | Facility: CLINIC | Age: 81
End: 2021-10-29
Payer: MEDICARE

## 2021-10-29 PROCEDURE — 93298 REM INTERROG DEV EVAL SCRMS: CPT | Mod: NC

## 2021-10-29 PROCEDURE — G2066: CPT | Mod: NC

## 2021-11-04 ENCOUNTER — APPOINTMENT (OUTPATIENT)
Dept: ELECTROPHYSIOLOGY | Facility: CLINIC | Age: 81
End: 2021-11-04

## 2021-12-06 ENCOUNTER — APPOINTMENT (OUTPATIENT)
Dept: ELECTROPHYSIOLOGY | Facility: CLINIC | Age: 81
End: 2021-12-06
Payer: MEDICARE

## 2021-12-06 ENCOUNTER — NON-APPOINTMENT (OUTPATIENT)
Age: 81
End: 2021-12-06

## 2021-12-06 PROCEDURE — 93298 REM INTERROG DEV EVAL SCRMS: CPT

## 2021-12-06 PROCEDURE — G2066: CPT

## 2022-01-07 ENCOUNTER — NON-APPOINTMENT (OUTPATIENT)
Age: 82
End: 2022-01-07

## 2022-01-07 ENCOUNTER — APPOINTMENT (OUTPATIENT)
Dept: ELECTROPHYSIOLOGY | Facility: CLINIC | Age: 82
End: 2022-01-07
Payer: MEDICARE

## 2022-01-07 PROCEDURE — G2066: CPT

## 2022-01-07 PROCEDURE — 93298 REM INTERROG DEV EVAL SCRMS: CPT

## 2022-02-11 ENCOUNTER — APPOINTMENT (OUTPATIENT)
Dept: ELECTROPHYSIOLOGY | Facility: CLINIC | Age: 82
End: 2022-02-11
Payer: MEDICARE

## 2022-02-11 ENCOUNTER — NON-APPOINTMENT (OUTPATIENT)
Age: 82
End: 2022-02-11

## 2022-02-11 PROCEDURE — G2066: CPT

## 2022-02-11 PROCEDURE — 93298 REM INTERROG DEV EVAL SCRMS: CPT

## 2022-03-18 ENCOUNTER — NON-APPOINTMENT (OUTPATIENT)
Age: 82
End: 2022-03-18

## 2022-03-18 ENCOUNTER — APPOINTMENT (OUTPATIENT)
Dept: ELECTROPHYSIOLOGY | Facility: CLINIC | Age: 82
End: 2022-03-18
Payer: MEDICARE

## 2022-03-18 PROCEDURE — 93298 REM INTERROG DEV EVAL SCRMS: CPT

## 2022-03-18 PROCEDURE — G2066: CPT

## 2022-04-21 ENCOUNTER — APPOINTMENT (OUTPATIENT)
Dept: ELECTROPHYSIOLOGY | Facility: CLINIC | Age: 82
End: 2022-04-21
Payer: MEDICARE

## 2022-04-21 ENCOUNTER — NON-APPOINTMENT (OUTPATIENT)
Age: 82
End: 2022-04-21

## 2022-04-21 PROCEDURE — G2066: CPT

## 2022-04-21 PROCEDURE — 93298 REM INTERROG DEV EVAL SCRMS: CPT

## 2022-05-27 ENCOUNTER — APPOINTMENT (OUTPATIENT)
Dept: ELECTROPHYSIOLOGY | Facility: CLINIC | Age: 82
End: 2022-05-27
Payer: MEDICARE

## 2022-05-27 ENCOUNTER — NON-APPOINTMENT (OUTPATIENT)
Age: 82
End: 2022-05-27

## 2022-05-27 PROCEDURE — 93298 REM INTERROG DEV EVAL SCRMS: CPT

## 2022-05-27 PROCEDURE — G2066: CPT

## 2022-07-01 ENCOUNTER — NON-APPOINTMENT (OUTPATIENT)
Age: 82
End: 2022-07-01

## 2022-07-01 ENCOUNTER — APPOINTMENT (OUTPATIENT)
Dept: ELECTROPHYSIOLOGY | Facility: CLINIC | Age: 82
End: 2022-07-01

## 2022-07-01 PROCEDURE — 93298 REM INTERROG DEV EVAL SCRMS: CPT

## 2022-07-01 PROCEDURE — G2066: CPT

## 2022-08-05 ENCOUNTER — APPOINTMENT (OUTPATIENT)
Dept: ELECTROPHYSIOLOGY | Facility: CLINIC | Age: 82
End: 2022-08-05

## 2022-08-05 ENCOUNTER — NON-APPOINTMENT (OUTPATIENT)
Age: 82
End: 2022-08-05

## 2022-08-05 PROCEDURE — G2066: CPT

## 2022-08-05 PROCEDURE — 93298 REM INTERROG DEV EVAL SCRMS: CPT

## 2022-09-09 ENCOUNTER — APPOINTMENT (OUTPATIENT)
Dept: ELECTROPHYSIOLOGY | Facility: CLINIC | Age: 82
End: 2022-09-09

## 2022-09-09 ENCOUNTER — NON-APPOINTMENT (OUTPATIENT)
Age: 82
End: 2022-09-09

## 2022-09-09 PROCEDURE — 93298 REM INTERROG DEV EVAL SCRMS: CPT

## 2022-09-09 PROCEDURE — G2066: CPT

## 2022-10-14 ENCOUNTER — NON-APPOINTMENT (OUTPATIENT)
Age: 82
End: 2022-10-14

## 2022-10-14 ENCOUNTER — APPOINTMENT (OUTPATIENT)
Dept: ELECTROPHYSIOLOGY | Facility: CLINIC | Age: 82
End: 2022-10-14

## 2022-10-14 PROCEDURE — 93298 REM INTERROG DEV EVAL SCRMS: CPT

## 2022-10-14 PROCEDURE — G2066: CPT

## 2022-11-18 ENCOUNTER — APPOINTMENT (OUTPATIENT)
Dept: ELECTROPHYSIOLOGY | Facility: CLINIC | Age: 82
End: 2022-11-18

## 2022-11-18 ENCOUNTER — NON-APPOINTMENT (OUTPATIENT)
Age: 82
End: 2022-11-18

## 2022-11-18 PROCEDURE — 93298 REM INTERROG DEV EVAL SCRMS: CPT

## 2022-11-18 PROCEDURE — G2066: CPT

## 2022-11-26 NOTE — ED ADULT NURSE NOTE - NS ED NURSE RECORD ANOTHER HT AND WT
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

## 2022-12-23 ENCOUNTER — NON-APPOINTMENT (OUTPATIENT)
Age: 82
End: 2022-12-23

## 2022-12-23 ENCOUNTER — APPOINTMENT (OUTPATIENT)
Dept: ELECTROPHYSIOLOGY | Facility: CLINIC | Age: 82
End: 2022-12-23

## 2022-12-23 PROCEDURE — 93298 REM INTERROG DEV EVAL SCRMS: CPT

## 2022-12-23 PROCEDURE — G2066: CPT

## 2023-01-26 ENCOUNTER — APPOINTMENT (OUTPATIENT)
Dept: ELECTROPHYSIOLOGY | Facility: CLINIC | Age: 83
End: 2023-01-26
Payer: MEDICARE

## 2023-01-26 ENCOUNTER — NON-APPOINTMENT (OUTPATIENT)
Age: 83
End: 2023-01-26

## 2023-01-26 PROCEDURE — G2066: CPT

## 2023-01-26 PROCEDURE — 93298 REM INTERROG DEV EVAL SCRMS: CPT

## 2023-03-02 ENCOUNTER — APPOINTMENT (OUTPATIENT)
Dept: ELECTROPHYSIOLOGY | Facility: CLINIC | Age: 83
End: 2023-03-02
Payer: MEDICARE

## 2023-03-02 ENCOUNTER — NON-APPOINTMENT (OUTPATIENT)
Age: 83
End: 2023-03-02

## 2023-03-02 PROCEDURE — G2066: CPT

## 2023-03-02 PROCEDURE — 93298 REM INTERROG DEV EVAL SCRMS: CPT

## 2023-04-06 ENCOUNTER — NON-APPOINTMENT (OUTPATIENT)
Age: 83
End: 2023-04-06

## 2023-04-06 ENCOUNTER — APPOINTMENT (OUTPATIENT)
Dept: ELECTROPHYSIOLOGY | Facility: CLINIC | Age: 83
End: 2023-04-06
Payer: MEDICARE

## 2023-04-06 PROCEDURE — 93298 REM INTERROG DEV EVAL SCRMS: CPT

## 2023-04-06 PROCEDURE — G2066: CPT

## 2023-05-03 NOTE — ED PROVIDER NOTE - IV ALTEPLASE ADMIN OUTSIDE HIDDEN
show Dupixent Counseling: I discussed with the patient the risks of dupilumab including but not limited to eye infection and irritation, cold sores, injection site reactions, worsening of asthma, allergic reactions and increased risk of parasitic infection.  Live vaccines should be avoided while taking dupilumab. Dupilumab will also interact with certain medications such as warfarin and cyclosporine. The patient understands that monitoring is required and they must alert us or the primary physician if symptoms of infection or other concerning signs are noted.

## 2023-05-10 ENCOUNTER — NON-APPOINTMENT (OUTPATIENT)
Age: 83
End: 2023-05-10

## 2023-05-10 ENCOUNTER — APPOINTMENT (OUTPATIENT)
Dept: ELECTROPHYSIOLOGY | Facility: CLINIC | Age: 83
End: 2023-05-10
Payer: MEDICARE

## 2023-05-10 PROCEDURE — 93298 REM INTERROG DEV EVAL SCRMS: CPT

## 2023-05-10 PROCEDURE — G2066: CPT

## 2023-05-31 ENCOUNTER — INPATIENT (INPATIENT)
Facility: HOSPITAL | Age: 83
LOS: 4 days | Discharge: HOME | End: 2023-06-05
Attending: INTERNAL MEDICINE | Admitting: INTERNAL MEDICINE
Payer: MEDICARE

## 2023-05-31 VITALS
HEART RATE: 81 BPM | OXYGEN SATURATION: 100 % | RESPIRATION RATE: 18 BRPM | SYSTOLIC BLOOD PRESSURE: 131 MMHG | DIASTOLIC BLOOD PRESSURE: 51 MMHG | TEMPERATURE: 101 F

## 2023-05-31 DIAGNOSIS — Z90.710 ACQUIRED ABSENCE OF BOTH CERVIX AND UTERUS: Chronic | ICD-10-CM

## 2023-05-31 DIAGNOSIS — Z98.890 OTHER SPECIFIED POSTPROCEDURAL STATES: Chronic | ICD-10-CM

## 2023-05-31 DIAGNOSIS — I77.0 ARTERIOVENOUS FISTULA, ACQUIRED: Chronic | ICD-10-CM

## 2023-05-31 LAB
ALBUMIN SERPL ELPH-MCNC: 3.9 G/DL — SIGNIFICANT CHANGE UP (ref 3.3–5)
ALP SERPL-CCNC: 66 U/L — SIGNIFICANT CHANGE UP (ref 40–120)
ALT FLD-CCNC: 19 U/L — SIGNIFICANT CHANGE UP (ref 4–33)
ANION GAP SERPL CALC-SCNC: 18 MMOL/L — HIGH (ref 7–14)
ANISOCYTOSIS BLD QL: SLIGHT — SIGNIFICANT CHANGE UP
APTT BLD: 34.2 SEC — SIGNIFICANT CHANGE UP (ref 27–36.3)
AST SERPL-CCNC: 41 U/L — HIGH (ref 4–32)
BASE EXCESS BLDV CALC-SCNC: 5.4 MMOL/L — HIGH (ref -2–3)
BASOPHILS # BLD AUTO: 0 K/UL — SIGNIFICANT CHANGE UP (ref 0–0.2)
BASOPHILS NFR BLD AUTO: 0 % — SIGNIFICANT CHANGE UP (ref 0–2)
BILIRUB SERPL-MCNC: 0.3 MG/DL — SIGNIFICANT CHANGE UP (ref 0.2–1.2)
BLOOD GAS VENOUS COMPREHENSIVE RESULT: SIGNIFICANT CHANGE UP
BUN SERPL-MCNC: 26 MG/DL — HIGH (ref 7–23)
CALCIUM SERPL-MCNC: 8.8 MG/DL — SIGNIFICANT CHANGE UP (ref 8.4–10.5)
CHLORIDE BLDV-SCNC: 98 MMOL/L — SIGNIFICANT CHANGE UP (ref 96–108)
CHLORIDE SERPL-SCNC: 93 MMOL/L — LOW (ref 98–107)
CO2 BLDV-SCNC: 31.9 MMOL/L — HIGH (ref 22–26)
CO2 SERPL-SCNC: 26 MMOL/L — SIGNIFICANT CHANGE UP (ref 22–31)
CREAT SERPL-MCNC: 5.16 MG/DL — HIGH (ref 0.5–1.3)
EGFR: 8 ML/MIN/1.73M2 — LOW
EOSINOPHIL # BLD AUTO: 0 K/UL — SIGNIFICANT CHANGE UP (ref 0–0.5)
EOSINOPHIL NFR BLD AUTO: 0 % — SIGNIFICANT CHANGE UP (ref 0–6)
GAS PNL BLDV: 133 MMOL/L — LOW (ref 136–145)
GAS PNL BLDV: SIGNIFICANT CHANGE UP
GIANT PLATELETS BLD QL SMEAR: PRESENT — SIGNIFICANT CHANGE UP
GLUCOSE BLDV-MCNC: 107 MG/DL — HIGH (ref 70–99)
GLUCOSE SERPL-MCNC: 113 MG/DL — HIGH (ref 70–99)
HCO3 BLDV-SCNC: 30 MMOL/L — HIGH (ref 22–29)
HCT VFR BLD CALC: 34.4 % — LOW (ref 34.5–45)
HCT VFR BLDA CALC: 32 % — LOW (ref 34.5–46.5)
HGB BLD CALC-MCNC: 10.6 G/DL — LOW (ref 11.7–16.1)
HGB BLD-MCNC: 11 G/DL — LOW (ref 11.5–15.5)
IANC: 3.38 K/UL — SIGNIFICANT CHANGE UP (ref 1.8–7.4)
INR BLD: 1.07 RATIO — SIGNIFICANT CHANGE UP (ref 0.88–1.16)
LACTATE BLDV-MCNC: 1 MMOL/L — SIGNIFICANT CHANGE UP (ref 0.5–2)
LIDOCAIN IGE QN: 82 U/L — HIGH (ref 7–60)
LYMPHOCYTES # BLD AUTO: 0.81 K/UL — LOW (ref 1–3.3)
LYMPHOCYTES # BLD AUTO: 16.1 % — SIGNIFICANT CHANGE UP (ref 13–44)
MACROCYTES BLD QL: SLIGHT — SIGNIFICANT CHANGE UP
MANUAL SMEAR VERIFICATION: SIGNIFICANT CHANGE UP
MCHC RBC-ENTMCNC: 31 PG — SIGNIFICANT CHANGE UP (ref 27–34)
MCHC RBC-ENTMCNC: 32 GM/DL — SIGNIFICANT CHANGE UP (ref 32–36)
MCV RBC AUTO: 96.9 FL — SIGNIFICANT CHANGE UP (ref 80–100)
MONOCYTES # BLD AUTO: 0.36 K/UL — SIGNIFICANT CHANGE UP (ref 0–0.9)
MONOCYTES NFR BLD AUTO: 7.1 % — SIGNIFICANT CHANGE UP (ref 2–14)
NEUTROPHILS # BLD AUTO: 3.85 K/UL — SIGNIFICANT CHANGE UP (ref 1.8–7.4)
NEUTROPHILS NFR BLD AUTO: 76.8 % — SIGNIFICANT CHANGE UP (ref 43–77)
PCO2 BLDV: 46 MMHG — SIGNIFICANT CHANGE UP (ref 39–52)
PH BLDV: 7.43 — SIGNIFICANT CHANGE UP (ref 7.32–7.43)
PLAT MORPH BLD: NORMAL — SIGNIFICANT CHANGE UP
PLATELET # BLD AUTO: 88 K/UL — LOW (ref 150–400)
PLATELET COUNT - ESTIMATE: ABNORMAL
PO2 BLDV: 49 MMHG — HIGH (ref 25–45)
POIKILOCYTOSIS BLD QL AUTO: SLIGHT — SIGNIFICANT CHANGE UP
POLYCHROMASIA BLD QL SMEAR: SLIGHT — SIGNIFICANT CHANGE UP
POTASSIUM BLDV-SCNC: 3.9 MMOL/L — SIGNIFICANT CHANGE UP (ref 3.5–5.1)
POTASSIUM SERPL-MCNC: 4.4 MMOL/L — SIGNIFICANT CHANGE UP (ref 3.5–5.3)
POTASSIUM SERPL-SCNC: 4.4 MMOL/L — SIGNIFICANT CHANGE UP (ref 3.5–5.3)
PROT SERPL-MCNC: 6.9 G/DL — SIGNIFICANT CHANGE UP (ref 6–8.3)
PROTHROM AB SERPL-ACNC: 12.4 SEC — SIGNIFICANT CHANGE UP (ref 10.5–13.4)
RBC # BLD: 3.55 M/UL — LOW (ref 3.8–5.2)
RBC # FLD: 16.5 % — HIGH (ref 10.3–14.5)
RBC BLD AUTO: ABNORMAL
SAO2 % BLDV: 77.2 % — SIGNIFICANT CHANGE UP (ref 67–88)
SMUDGE CELLS # BLD: PRESENT — SIGNIFICANT CHANGE UP
SODIUM SERPL-SCNC: 137 MMOL/L — SIGNIFICANT CHANGE UP (ref 135–145)
WBC # BLD: 5.01 K/UL — SIGNIFICANT CHANGE UP (ref 3.8–10.5)
WBC # FLD AUTO: 5.01 K/UL — SIGNIFICANT CHANGE UP (ref 3.8–10.5)

## 2023-05-31 PROCEDURE — 99285 EMERGENCY DEPT VISIT HI MDM: CPT

## 2023-05-31 PROCEDURE — 71045 X-RAY EXAM CHEST 1 VIEW: CPT | Mod: 26

## 2023-05-31 RX ORDER — IBUPROFEN 200 MG
400 TABLET ORAL ONCE
Refills: 0 | Status: COMPLETED | OUTPATIENT
Start: 2023-05-31 | End: 2023-05-31

## 2023-05-31 RX ADMIN — Medication 400 MILLIGRAM(S): at 22:27

## 2023-05-31 RX ADMIN — Medication 400 MILLIGRAM(S): at 22:57

## 2023-05-31 NOTE — ED PROVIDER NOTE - OBJECTIVE STATEMENT
Patient is an 82-year-old female with a history of HTN, Aortic stenosis, DM, ESRD on dialysis MWF who presents to the ED with general weakness and a urinary tract infection.  She states that on Monday, she started having chills and general malaise, feeling weak.  On Tuesday she went to the urgent care and was diagnosed with a urinary tract infection (still makes a minimal amount of urine) and was prescribed an antibiotic (cefuroxime).  Today she went to dialysis and had her full session but as she continued to be weak and feel unwell, they recommended that her daughter take her to the emergency department.  Patient denies any chest pain or shortness of breath, no nausea or vomiting.  Patient does note general abdominal pain.  Abdominal pain started today, is more right lower quadrant and left lower quadrant.  No hematuria.  No diarrhea or constipation. took 1000mg Tylenol at 7pm

## 2023-05-31 NOTE — ED ADULT TRIAGE NOTE - CHIEF COMPLAINT QUOTE
Pt c/o generalized weakness started on cefuroxime for UTI yesterday. Hx HTN, Dialysis MWF full session today, T2DM. Febrile in triage. Denies cough, sob, chest pain.

## 2023-05-31 NOTE — ED ADULT NURSE NOTE - NSFALLRISKINTERV_ED_ALL_ED

## 2023-05-31 NOTE — ED PROVIDER NOTE - PHYSICAL EXAMINATION
Radha Alex MD  GENERAL: Patient awake alert NAD. febrile  HEENT: NC/AT, Moist mucous membranes, EOMI.  LUNGS: CTAB, no wheezes or crackles.   CARDIAC: RRR, no m/r/g.    ABDOMEN: Soft, +tender LLQ and RLQ, ND, No rebound, guarding. +bilat CVA tenderness.   EXT: No edema. No calf tenderness. CV 2+DP/PT bilaterally.   MSK: No pain with movement, no deformities.  NEURO: A&Ox3. Moving all extremities.  SKIN: Warm and dry. No rash.  PSYCH: Normal affect.

## 2023-05-31 NOTE — ED PROVIDER NOTE - PROGRESS NOTE DETAILS
CT abd reassuring. possible left renal mass which will need onc workup outpt or inpt.  called nephro Dr. Calderon and he will see pt to eval for dialysis after receiving contrast.  tba for failure to thrive 2/2 covid and uti.

## 2023-05-31 NOTE — ED PROVIDER NOTE - CLINICAL SUMMARY MEDICAL DECISION MAKING FREE TEXT BOX
Isai - Patient is an 82-year-old female with a history of Aortic stenosis, DM, ESRD on dialysis MWF who presents to the ED with general weakness and a urinary tract infection. concern for pyelo/UTI vs diverticulitis less likely vs lyte abnormalities. will check labs, urine, cxr, ekg, ct abd

## 2023-05-31 NOTE — ED ADULT NURSE NOTE - OBJECTIVE STATEMENT
A&Ox4. Past medical history of HTN, Aortic stenosis, DM, ESRD on dialysis MJANNETH, received full doses on Wednesday to AV fistula on left forearm. Presents to the ED with general weakness and a urinary tract infection.  She states that on Monday, she started having chills and general malaise, feeling weak.  On Tuesday she went to the urgent care and was diagnosed with a urinary tract infection (still makes a minimal amount of urine) and was prescribed an antibiotic (cefuroxime).  Today she went to dialysis and had her full session but as she continued to be weak and feel unwell, they recommended that her daughter take her to the emergency department.  Patient denies any chest pain or shortness of breath, no nausea or vomiting.  Patient does note general abdominal pain.  Abdominal pain started today, is more right lower quadrant and left lower quadrant.  No hematuria.  No diarrhea or constipation. took 1000mg Tylenol at 7pm. Respirations even an unlabored. 20 gauged IV started to right AC. Labs obtained, awaiting results. Medications given as per current care plan. Safety precautions in place, bed in lowest position and oriented to call bell.

## 2023-05-31 NOTE — ED PROVIDER NOTE - ATTENDING CONTRIBUTION TO CARE
82-year-old female with history of hypertension, aortic stenosis, diabetes, end-stage renal disease coming in with generalized weakness, cough, abdominal pain.  Patient was seen in urgent care yesterday for the symptoms and was started on cefuroxime for UTI.  Daughter brought the patient in for worsening symptoms.  Patient is well-appearing no distress.  Abdomen soft with tenderness to palpation in left lower quadrant.  Bilateral CVA tenderness to palpation.  Clear to auscultation bilaterally.  Regular rate and rhythm.  Concern for pyelonephritis, other intra-abdominal pathology including diverticulitis, pneumonia.

## 2023-05-31 NOTE — ED PROVIDER NOTE - NSICDXPASTMEDICALHX_GEN_ALL_CORE_FT
PAST MEDICAL HISTORY:  Aortic stenosis wishes to discuss TAVR, has Cardiology clearance    Bilateral cataracts     Cough hx of pt on inhaler under Pulmonology  care , last time used November 2020    Diabetic retinopathy     DM (diabetes mellitus) on insulin    ESRD (end stage renal disease) on dialysis     Fibroids hx of    Gout     H/O syncope 09/19/2020- pt hospitalized , cardiac workup done , HD started loop recorder done    HLD (hyperlipidemia)     Hypertension     Refusal of blood transfusions as patient is [employer]

## 2023-05-31 NOTE — ED ADULT NURSE NOTE - NSICDXPASTMEDICALHX_GEN_ALL_CORE_FT
PAST MEDICAL HISTORY:  Aortic stenosis wishes to discuss TAVR, has Cardiology clearance    Bilateral cataracts     Cough hx of pt on inhaler under Pulmonology  care , last time used November 2020    Diabetic retinopathy     DM (diabetes mellitus) on insulin    ESRD (end stage renal disease) on dialysis     Fibroids hx of    Gout     H/O syncope 09/19/2020- pt hospitalized , cardiac workup done , HD started loop recorder done    HLD (hyperlipidemia)     Hypertension     Refusal of blood transfusions as patient is [employer]

## 2023-06-01 DIAGNOSIS — Z29.9 ENCOUNTER FOR PROPHYLACTIC MEASURES, UNSPECIFIED: ICD-10-CM

## 2023-06-01 DIAGNOSIS — N28.89 OTHER SPECIFIED DISORDERS OF KIDNEY AND URETER: ICD-10-CM

## 2023-06-01 DIAGNOSIS — U07.1 COVID-19: ICD-10-CM

## 2023-06-01 DIAGNOSIS — E78.5 HYPERLIPIDEMIA, UNSPECIFIED: ICD-10-CM

## 2023-06-01 DIAGNOSIS — N18.6 END STAGE RENAL DISEASE: ICD-10-CM

## 2023-06-01 DIAGNOSIS — I10 ESSENTIAL (PRIMARY) HYPERTENSION: ICD-10-CM

## 2023-06-01 DIAGNOSIS — Z79.899 OTHER LONG TERM (CURRENT) DRUG THERAPY: ICD-10-CM

## 2023-06-01 DIAGNOSIS — N39.0 URINARY TRACT INFECTION, SITE NOT SPECIFIED: ICD-10-CM

## 2023-06-01 LAB
ANION GAP SERPL CALC-SCNC: 17 MMOL/L — HIGH (ref 7–14)
APPEARANCE UR: ABNORMAL
B PERT DNA SPEC QL NAA+PROBE: SIGNIFICANT CHANGE UP
B PERT+PARAPERT DNA PNL SPEC NAA+PROBE: SIGNIFICANT CHANGE UP
BACTERIA # UR AUTO: NEGATIVE — SIGNIFICANT CHANGE UP
BILIRUB UR-MCNC: NEGATIVE — SIGNIFICANT CHANGE UP
BORDETELLA PARAPERTUSSIS (RAPRVP): SIGNIFICANT CHANGE UP
BUN SERPL-MCNC: 34 MG/DL — HIGH (ref 7–23)
C PNEUM DNA SPEC QL NAA+PROBE: SIGNIFICANT CHANGE UP
CALCIUM SERPL-MCNC: 8.2 MG/DL — LOW (ref 8.4–10.5)
CHLORIDE SERPL-SCNC: 93 MMOL/L — LOW (ref 98–107)
CO2 SERPL-SCNC: 26 MMOL/L — SIGNIFICANT CHANGE UP (ref 22–31)
COLOR SPEC: YELLOW — SIGNIFICANT CHANGE UP
CREAT SERPL-MCNC: 6.62 MG/DL — HIGH (ref 0.5–1.3)
CRP SERPL-MCNC: 43.1 MG/L — HIGH
D DIMER BLD IA.RAPID-MCNC: 803 NG/ML DDU — HIGH
DIFF PNL FLD: ABNORMAL
EGFR: 6 ML/MIN/1.73M2 — LOW
EPI CELLS # UR: >27 /HPF — HIGH (ref 0–5)
FERRITIN SERPL-MCNC: 1772 NG/ML — HIGH (ref 15–150)
FLUAV SUBTYP SPEC NAA+PROBE: SIGNIFICANT CHANGE UP
FLUBV RNA SPEC QL NAA+PROBE: SIGNIFICANT CHANGE UP
GLUCOSE BLDC GLUCOMTR-MCNC: 109 MG/DL — HIGH (ref 70–99)
GLUCOSE BLDC GLUCOMTR-MCNC: 112 MG/DL — HIGH (ref 70–99)
GLUCOSE BLDC GLUCOMTR-MCNC: 99 MG/DL — SIGNIFICANT CHANGE UP (ref 70–99)
GLUCOSE SERPL-MCNC: 120 MG/DL — HIGH (ref 70–99)
GLUCOSE UR QL: NEGATIVE — SIGNIFICANT CHANGE UP
HADV DNA SPEC QL NAA+PROBE: SIGNIFICANT CHANGE UP
HCOV 229E RNA SPEC QL NAA+PROBE: SIGNIFICANT CHANGE UP
HCOV HKU1 RNA SPEC QL NAA+PROBE: SIGNIFICANT CHANGE UP
HCOV NL63 RNA SPEC QL NAA+PROBE: SIGNIFICANT CHANGE UP
HCOV OC43 RNA SPEC QL NAA+PROBE: SIGNIFICANT CHANGE UP
HCT VFR BLD CALC: 32.6 % — LOW (ref 34.5–45)
HGB BLD-MCNC: 10.1 G/DL — LOW (ref 11.5–15.5)
HMPV RNA SPEC QL NAA+PROBE: SIGNIFICANT CHANGE UP
HPIV1 RNA SPEC QL NAA+PROBE: SIGNIFICANT CHANGE UP
HPIV2 RNA SPEC QL NAA+PROBE: SIGNIFICANT CHANGE UP
HPIV3 RNA SPEC QL NAA+PROBE: SIGNIFICANT CHANGE UP
HPIV4 RNA SPEC QL NAA+PROBE: SIGNIFICANT CHANGE UP
HYALINE CASTS # UR AUTO: 2 /LPF — SIGNIFICANT CHANGE UP (ref 0–7)
KETONES UR-MCNC: NEGATIVE — SIGNIFICANT CHANGE UP
LEUKOCYTE ESTERASE UR-ACNC: ABNORMAL
M PNEUMO DNA SPEC QL NAA+PROBE: SIGNIFICANT CHANGE UP
MAGNESIUM SERPL-MCNC: 2.2 MG/DL — SIGNIFICANT CHANGE UP (ref 1.6–2.6)
MCHC RBC-ENTMCNC: 30.8 PG — SIGNIFICANT CHANGE UP (ref 27–34)
MCHC RBC-ENTMCNC: 31 GM/DL — LOW (ref 32–36)
MCV RBC AUTO: 99.4 FL — SIGNIFICANT CHANGE UP (ref 80–100)
NITRITE UR-MCNC: NEGATIVE — SIGNIFICANT CHANGE UP
NRBC # BLD: 0 /100 WBCS — SIGNIFICANT CHANGE UP (ref 0–0)
NRBC # FLD: 0 K/UL — SIGNIFICANT CHANGE UP (ref 0–0)
PH UR: 7.5 — SIGNIFICANT CHANGE UP (ref 5–8)
PHOSPHATE SERPL-MCNC: 4.7 MG/DL — HIGH (ref 2.5–4.5)
PLATELET # BLD AUTO: 77 K/UL — LOW (ref 150–400)
POTASSIUM SERPL-MCNC: 3.7 MMOL/L — SIGNIFICANT CHANGE UP (ref 3.5–5.3)
POTASSIUM SERPL-SCNC: 3.7 MMOL/L — SIGNIFICANT CHANGE UP (ref 3.5–5.3)
PROT UR-MCNC: ABNORMAL
RAPID RVP RESULT: DETECTED
RBC # BLD: 3.28 M/UL — LOW (ref 3.8–5.2)
RBC # FLD: 16.5 % — HIGH (ref 10.3–14.5)
RBC CASTS # UR COMP ASSIST: 5 /HPF — HIGH (ref 0–4)
RSV RNA SPEC QL NAA+PROBE: SIGNIFICANT CHANGE UP
RV+EV RNA SPEC QL NAA+PROBE: SIGNIFICANT CHANGE UP
SARS-COV-2 RNA SPEC QL NAA+PROBE: DETECTED
SODIUM SERPL-SCNC: 136 MMOL/L — SIGNIFICANT CHANGE UP (ref 135–145)
SP GR SPEC: 1.01 — SIGNIFICANT CHANGE UP (ref 1.01–1.05)
TROPONIN T, HIGH SENSITIVITY RESULT: 80 NG/L — CRITICAL HIGH
TROPONIN T, HIGH SENSITIVITY RESULT: 88 NG/L — CRITICAL HIGH
UROBILINOGEN FLD QL: SIGNIFICANT CHANGE UP
WBC # BLD: 3.32 K/UL — LOW (ref 3.8–10.5)
WBC # FLD AUTO: 3.32 K/UL — LOW (ref 3.8–10.5)
WBC UR QL: 7 /HPF — HIGH (ref 0–5)

## 2023-06-01 PROCEDURE — 99223 1ST HOSP IP/OBS HIGH 75: CPT

## 2023-06-01 PROCEDURE — 93010 ELECTROCARDIOGRAM REPORT: CPT

## 2023-06-01 PROCEDURE — 93010 ELECTROCARDIOGRAM REPORT: CPT | Mod: 76

## 2023-06-01 PROCEDURE — 74177 CT ABD & PELVIS W/CONTRAST: CPT | Mod: 26,MA

## 2023-06-01 RX ORDER — HEPARIN SODIUM 5000 [USP'U]/ML
5000 INJECTION INTRAVENOUS; SUBCUTANEOUS EVERY 8 HOURS
Refills: 0 | Status: DISCONTINUED | OUTPATIENT
Start: 2023-06-01 | End: 2023-06-05

## 2023-06-01 RX ORDER — NIFEDIPINE 30 MG
90 TABLET, EXTENDED RELEASE 24 HR ORAL EVERY 24 HOURS
Refills: 0 | Status: DISCONTINUED | OUTPATIENT
Start: 2023-06-02 | End: 2023-06-05

## 2023-06-01 RX ORDER — ALBUTEROL 90 UG/1
2 AEROSOL, METERED ORAL
Qty: 0 | Refills: 0 | DISCHARGE

## 2023-06-01 RX ORDER — METOPROLOL TARTRATE 50 MG
1 TABLET ORAL
Refills: 0 | DISCHARGE

## 2023-06-01 RX ORDER — CEFTRIAXONE 500 MG/1
1000 INJECTION, POWDER, FOR SOLUTION INTRAMUSCULAR; INTRAVENOUS EVERY 24 HOURS
Refills: 0 | Status: DISCONTINUED | OUTPATIENT
Start: 2023-06-02 | End: 2023-06-02

## 2023-06-01 RX ORDER — CEFTRIAXONE 500 MG/1
1000 INJECTION, POWDER, FOR SOLUTION INTRAMUSCULAR; INTRAVENOUS ONCE
Refills: 0 | Status: COMPLETED | OUTPATIENT
Start: 2023-06-01 | End: 2023-06-01

## 2023-06-01 RX ORDER — ATORVASTATIN CALCIUM 80 MG/1
20 TABLET, FILM COATED ORAL AT BEDTIME
Refills: 0 | Status: DISCONTINUED | OUTPATIENT
Start: 2023-06-01 | End: 2023-06-05

## 2023-06-01 RX ORDER — METOPROLOL TARTRATE 50 MG
50 TABLET ORAL
Refills: 0 | Status: DISCONTINUED | OUTPATIENT
Start: 2023-06-01 | End: 2023-06-05

## 2023-06-01 RX ORDER — ACETAMINOPHEN 500 MG
650 TABLET ORAL EVERY 6 HOURS
Refills: 0 | Status: DISCONTINUED | OUTPATIENT
Start: 2023-06-01 | End: 2023-06-05

## 2023-06-01 RX ORDER — IPRATROPIUM/ALBUTEROL SULFATE 18-103MCG
3 AEROSOL WITH ADAPTER (GRAM) INHALATION EVERY 6 HOURS
Refills: 0 | Status: DISCONTINUED | OUTPATIENT
Start: 2023-06-01 | End: 2023-06-05

## 2023-06-01 RX ORDER — ASPIRIN/CALCIUM CARB/MAGNESIUM 324 MG
1 TABLET ORAL
Qty: 0 | Refills: 0 | DISCHARGE

## 2023-06-01 RX ADMIN — HEPARIN SODIUM 5000 UNIT(S): 5000 INJECTION INTRAVENOUS; SUBCUTANEOUS at 14:55

## 2023-06-01 RX ADMIN — ATORVASTATIN CALCIUM 20 MILLIGRAM(S): 80 TABLET, FILM COATED ORAL at 21:23

## 2023-06-01 RX ADMIN — Medication 50 MILLIGRAM(S): at 17:35

## 2023-06-01 RX ADMIN — CEFTRIAXONE 100 MILLIGRAM(S): 500 INJECTION, POWDER, FOR SOLUTION INTRAMUSCULAR; INTRAVENOUS at 04:57

## 2023-06-01 RX ADMIN — Medication 600 MILLIGRAM(S): at 19:12

## 2023-06-01 RX ADMIN — HEPARIN SODIUM 5000 UNIT(S): 5000 INJECTION INTRAVENOUS; SUBCUTANEOUS at 21:23

## 2023-06-01 NOTE — H&P ADULT - HISTORY OF PRESENT ILLNESS
82-year-old female with a history of HTN, Aortic stenosis, DM, ESRD on dialysis MWF who presents to the ED with general weakness and a urinary tract infection.  She states that on Monday, she started having chills and general malaise, feeling weak.  On Tuesday she went to the urgent care and was diagnosed with a urinary tract infection (still makes a small amount of urine) and was prescribed an antibiotic (cefuroxime).  Today she went to dialysis and had her full session but as she continued to be weak and feel unwell, they recommended that her daughter take her to the emergency department.  Patient denies any chest pain or shortness of breath, no nausea or vomiting or abd pain. Reports cough and sore throat started yesterday. No hematuria.  No diarrhea or constipation.

## 2023-06-01 NOTE — H&P ADULT - NSHPREVIEWOFSYSTEMS_GEN_ALL_CORE
REVIEW OF SYSTEMS:    CONSTITUTIONAL: + weakness, fevers or chills  EYES: No visual change  ENT: No vertigo. +throat pain   NECK: No pain or stiffness  RESPIRATORY: No cough, wheezing, hemoptysis; No shortness of breath  CARDIOVASCULAR: No chest pain or palpitations  GASTROINTESTINAL: No abdominal or epigastric pain. No nausea, vomiting, or hematemesis; No diarrhea or constipation. No melena or hematochezia.  GENITOURINARY: No dysuria, frequency or hematuria  NEUROLOGICAL: No numbness or weakness  SKIN: No itching, rashes  MSK: no joint pain, full ROM  PSYCH: no anxiety no depression REVIEW OF SYSTEMS:    CONSTITUTIONAL: + weakness, fevers or chills  EYES: No visual change  ENT: No vertigo. +throat pain   NECK: No pain or stiffness  RESPIRATORY: +cough, no wheezing, hemoptysis; No shortness of breath  CARDIOVASCULAR: No chest pain or palpitations  GASTROINTESTINAL: No abdominal or epigastric pain. No nausea, vomiting, or hematemesis; No diarrhea or constipation. No melena or hematochezia.  GENITOURINARY: No dysuria, frequency or hematuria  NEUROLOGICAL: No numbness or weakness  SKIN: No itching, rashes  MSK: no joint pain, full ROM  PSYCH: no anxiety no depression

## 2023-06-01 NOTE — H&P ADULT - PROBLEM SELECTOR PLAN 1
since monday sore throat, cough, malaise, +COVID  -vaccinated x3, first time COVID infection  -spoke with ID fellow, remdemsivir is generally not recommended after 3-5 days  -given patient overall NAD, on room air, would do symptomatic support for now  -mucinex, duoneb prn, chest pt since monday sore throat, cough, malaise, +COVID  -vaccinated x3, first time COVID infection  -spoke with ID fellow, remdemsivir is generally not recommended after 3-5 days  -given patient overall NAD, on room air, would do symptomatic support for now  -mucinex, duoneb prn, chest pt  -obtain baseline EKG

## 2023-06-01 NOTE — H&P ADULT - NSHPPHYSICALEXAM_GEN_ALL_CORE
VITALS: T(C): 37 (06-01-23 @ 12:04), Max: 38.3 (05-31-23 @ 20:31)  HR: 82 (06-01-23 @ 12:04) (71 - 85)  BP: 156/53 (06-01-23 @ 12:04) (119/46 - 156/53)  RR: 19 (06-01-23 @ 12:04) (17 - 19)  SpO2: 98% (06-01-23 @ 12:04) (97% - 100%)  GENERAL: NAD, alert, resting comfortably  HEAD:  Atraumatic, Normocephalic  EYES:  conjunctiva and sclera clear  NECK: Supple, No JVD  CHEST/LUNG: Clear to auscultation bilaterally; No wheeze, ronchi or rales  HEART: Regular rate and rhythm; No murmurs, rubs, or gallops  ABDOMEN: Soft, Nontender, Nondistended; Bowel sounds present  EXTREMITIES:  2+ Peripheral Pulses, No clubbing, cyanosis, or edema  PSYCH: AAOx3, normal behavior, normal affect  NEUROLOGY: non-focal, normal strength, normal sensation  SKIN: No rashes or lesions

## 2023-06-01 NOTE — H&P ADULT - PROBLEM SELECTOR PLAN 2
reportedly +UA outpt. +UA here though >27 epithelial cells  -s/p CTX in ED  -will complete 3 day course  -f/u urine and blood cultures

## 2023-06-01 NOTE — H&P ADULT - CLICK TO LAUNCH ORM
OPTIFOAM TO WRIST INTACT. TURNING SELF A LETTLE LEFT SIDE TO BACK TO LEFT EVEN
WHEN APPARENTLY ASLEEP. HOME BRIEF FOR DRIBBLE INCONTINENCE, NOST OF OUTPUT
MAKES IT TO URINAL .

## 2023-06-01 NOTE — CONSULT NOTE ADULT - SUBJECTIVE AND OBJECTIVE BOX
[facility] - CHRISTELLE Archer / CHRISTELLE Hagen / DARLYN Sanchez/ CHRISTELLE Melton/ CHRISTELLE Herbert/ HANS Greene / CARLITO Villalba / JORDAN Calderon  -------------------------------------------------------------------------------------------------------  The patient seen and examined today.  HPI:  82-year-old female with a history of HTN, Aortic stenosis, DM, ESRD on dialysis MWF who presents to the ED with general weakness and a urinary tract infection.  She states that on Monday, she had chills and general malaise, feeling weak.  On Tuesday she went to the urgent care and was diagnosed with a urinary tract infection (still makes a minimal amount of urine) and was prescribed an antibiotic (cefuroxime).  Today she went to dialysis and had her full session but as she continued to be weak and feel unwell, they recommended that her daughter take her to the emergency department.  Patient denies any chest pain or shortness of breath, no nausea or vomiting.  Patient does note general abdominal pain.  Abdominal pain started today, is more right lower quadrant and left lower quadrant.  No hematuria.  No diarrhea or constipation. took 1000mg Tylenol at 7pm    PAST MEDICAL & SURGICAL HISTORY:  ESRD (end stage renal disease) on dialysis      DM (diabetes mellitus)  on insulin      Aortic stenosis  wishes to discuss TAVR, has Cardiology clearance      Hypertension      Gout      Diabetic retinopathy      Bilateral cataracts      Cough  hx of pt on inhaler under Pulmonology  care , last time used 2020      H/O syncope  2020- pt hospitalized , cardiac workup done , HD started loop recorder done      Refusal of blood transfusions as patient is Jewish      Fibroids  hx of      HLD (hyperlipidemia)      AV fistula  left arm- 2020      History of hysterectomy  1987      History of vascular access device  right chest permacath- 2020      History of loop recorder  left chest - 2020        Allergies :- No Known Allergies    Home Medications Reviewed  Hospital Medications:   MEDICATIONS  (STANDING):    SOCIAL HISTORY:  Denies ETOh,Smoking,   FAMILY HISTORY:  Family history of heart disease  mother, father, sisters-     Family history of hypertension  mother, father, sisiter-     Family history of diabetes mellitus  sister -         REVIEW OF SYSTEMS:  CONSTITUTIONAL: Malaise, fevers and chills +  EYES/ENT: No visual changes;  No vertigo or throat pain   NECK: No pain or stiffness  RESPIRATORY: No cough, wheezing, hemoptysis; No shortness of breath  CARDIOVASCULAR: No chest pain or palpitations.  GASTROINTESTINAL: No abdominal or epigastric pain. No nausea, vomiting, or hematemesis; No diarrhea or constipation. No melena or hematochezia.  GENITOURINARY: No dysuria, frequency, foamy urine, urinary urgency, incontinence or hematuria  NEUROLOGICAL: No numbness or weakness  SKIN: No itching, burning, rashes, or lesions   VASCULAR: No bilateral lower extremity edema.   All other review of systems is negative unless indicated above.    VITALS:  T(F): 98.5 (23 @ 06:54), Max: 101 (23 @ 20:31)  HR: 85 (23 @ 06:54)  BP: 134/63 (23 @ 06:54)  RR: 17 (23 @ 06:54)  SpO2: 97% (23 @ 06:54)  Wt(kg): --        PHYSICAL EXAM:  Constitutional: NAD  HEENT: anicteric sclera, oropharynx clear, MMM  Neck: supple.   Respiratory: Bilateral equal breath sounds , no wheezes, no crackles  Cardiovascular: S1, S2, Regular, Murmur present.  Gastrointestinal: Bowel Sound present, soft, NT/ND  Extremities: No cyanosis or clubbing. No peripheral edema  Neurological: Alert and oriented  Psychiatric: Normal mood, normal affect  : No CVA tenderness. No recinos.   Skin: No rashes    Data:      137  |  93<L>  |  26<H>  ----------------------------<  113<H>  4.4   |  26  |  5.16<H>    Ca    8.8      31 May 2023 22:38    TPro  6.9  /  Alb  3.9  /  TBili  0.3  /  DBili      /  AST  41<H>  /  ALT  19  /  AlkPhos  66  05-31    Creatinine Trend: 5.16 <--                        11.0   5.01  )-----------( 88       ( 31 May 2023 22:38 )             34.4     Urine Studies:  Urinalysis Basic - ( 2023 07:48 )    Color: Yellow / Appearance: Slightly Turbid / S.014 / pH:   Gluc:  / Ketone: Negative  / Bili: Negative / Urobili: <2 mg/dL   Blood:  / Protein: 100 mg/dL / Nitrite: Negative   Leuk Esterase: Large / RBC: 5 /HPF / WBC 7 /HPF   Sq Epi:  / Non Sq Epi:  / Bacteria: Negative

## 2023-06-01 NOTE — H&P ADULT - ASSESSMENT
82-year-old female with a history of HTN, Aortic stenosis, DM, ESRD on dialysis MWF who presents to the ED with general weakness and a urinary tract infection and found to be COVID+.

## 2023-06-01 NOTE — ED ADULT NURSE REASSESSMENT NOTE - NSFALLRISKINTERV_ED_ALL_ED

## 2023-06-01 NOTE — CONSULT NOTE ADULT - ASSESSMENT
82-year-old female with a history of HTN, Aortic stenosis, DM, ESRD on dialysis MWF who presents to the ED with general weakness and a urinary tract infection.    ESRD on HD  Consent in chart  HD center: [facility]  Access: AVF  Schedule MWF  plan:  due for HD tomorrow  renal diet  avoid nephrotoxins  daily BMP    COVID +  appreciate Infectious disease specialist recs      Thank you for allowing me to participate in the care of your patient    For any question, call:  Cell # [phone]  Pager # [phone]  Callback # [phone]

## 2023-06-01 NOTE — H&P ADULT - PROBLEM SELECTOR PLAN 7
need accurate med recs  per surescript: pt is on nifedipine, lopressor. lipitor. Per EMS paper, patient on hydralazine and isosorbide and other meds  attempted to call pharmacy but unable to get through  emailed med rec paharmacist to help

## 2023-06-01 NOTE — ED ADULT NURSE REASSESSMENT NOTE - NS ED NURSE REASSESS COMMENT FT1
PT is resting in stretcher, easily arousable to verbal stimuli. no apparent distress noted at this time. hand off will be given to primary nurse when return to area.
Received patient in room 23, admitted to medicine waiting for bed assignment. Patient resting in stretcher, no distress noted. Patient denies any pain/discomfort at this time. Patient is A&OX4, airway patent, breathing unlabored and even. Side rails up and safety maintained. Fall precaution in place. Call bells within reach.
Report given to APURVA Ramos from [facility] for continuity of care.
A&Ox4. Respirations even and unlabored. Safety precautions in place. Admitted, awaiting bed assignment.

## 2023-06-01 NOTE — PHARMACOTHERAPY INTERVENTION NOTE - COMMENTS
Medication history is incomplete. Unable to verify patient's medication list with two sources. Please call spectra [phone] if you have any questions.   Source: [facility]

## 2023-06-01 NOTE — H&P ADULT - PROBLEM SELECTOR PLAN 3
on CT: Indeterminate presumably enhancing left renal mass concerning for malignancy  new to patient  outpt w/u given active COVID at this time

## 2023-06-01 NOTE — H&P ADULT - PROBLEM SELECTOR PLAN 5
need accurate med recs  per surescript: pt is on nifedipine, lopressor. Per EMS paper, patient on hydralazine and isosorbide.   attempted to call pharmacy but unable to get through  -c/w nifefiepine and lopressor for now

## 2023-06-02 LAB
ANION GAP SERPL CALC-SCNC: 18 MMOL/L — HIGH (ref 7–14)
BUN SERPL-MCNC: 56 MG/DL — HIGH (ref 7–23)
CALCIUM SERPL-MCNC: 8.5 MG/DL — SIGNIFICANT CHANGE UP (ref 8.4–10.5)
CHLORIDE SERPL-SCNC: 94 MMOL/L — LOW (ref 98–107)
CO2 SERPL-SCNC: 23 MMOL/L — SIGNIFICANT CHANGE UP (ref 22–31)
CREAT SERPL-MCNC: 8.59 MG/DL — HIGH (ref 0.5–1.3)
CRP SERPL-MCNC: 25.4 MG/L — HIGH
CULTURE RESULTS: SIGNIFICANT CHANGE UP
D DIMER BLD IA.RAPID-MCNC: 364 NG/ML DDU — HIGH
DIALYSIS INSTRUMENT RESULT - HEPATITIS B SURFACE ANTIGEN: NEGATIVE — SIGNIFICANT CHANGE UP
EGFR: 4 ML/MIN/1.73M2 — LOW
FERRITIN SERPL-MCNC: 2226 NG/ML — HIGH (ref 15–150)
GLUCOSE SERPL-MCNC: 100 MG/DL — HIGH (ref 70–99)
HCT VFR BLD CALC: 29.7 % — LOW (ref 34.5–45)
HGB BLD-MCNC: 9.7 G/DL — LOW (ref 11.5–15.5)
MAGNESIUM SERPL-MCNC: 2.2 MG/DL — SIGNIFICANT CHANGE UP (ref 1.6–2.6)
MCHC RBC-ENTMCNC: 30.8 PG — SIGNIFICANT CHANGE UP (ref 27–34)
MCHC RBC-ENTMCNC: 32.7 GM/DL — SIGNIFICANT CHANGE UP (ref 32–36)
MCV RBC AUTO: 94.3 FL — SIGNIFICANT CHANGE UP (ref 80–100)
NRBC # BLD: 0 /100 WBCS — SIGNIFICANT CHANGE UP (ref 0–0)
NRBC # FLD: 0 K/UL — SIGNIFICANT CHANGE UP (ref 0–0)
PHOSPHATE SERPL-MCNC: 5.1 MG/DL — HIGH (ref 2.5–4.5)
PLATELET # BLD AUTO: 80 K/UL — LOW (ref 150–400)
POTASSIUM SERPL-MCNC: 4 MMOL/L — SIGNIFICANT CHANGE UP (ref 3.5–5.3)
POTASSIUM SERPL-SCNC: 4 MMOL/L — SIGNIFICANT CHANGE UP (ref 3.5–5.3)
RBC # BLD: 3.15 M/UL — LOW (ref 3.8–5.2)
RBC # FLD: 16.1 % — HIGH (ref 10.3–14.5)
SODIUM SERPL-SCNC: 135 MMOL/L — SIGNIFICANT CHANGE UP (ref 135–145)
SPECIMEN SOURCE: SIGNIFICANT CHANGE UP
WBC # BLD: 3.41 K/UL — LOW (ref 3.8–10.5)
WBC # FLD AUTO: 3.41 K/UL — LOW (ref 3.8–10.5)

## 2023-06-02 PROCEDURE — 99222 1ST HOSP IP/OBS MODERATE 55: CPT

## 2023-06-02 RX ORDER — REMDESIVIR 5 MG/ML
200 INJECTION INTRAVENOUS EVERY 24 HOURS
Refills: 0 | Status: COMPLETED | OUTPATIENT
Start: 2023-06-02 | End: 2023-06-03

## 2023-06-02 RX ORDER — REMDESIVIR 5 MG/ML
INJECTION INTRAVENOUS
Refills: 0 | Status: COMPLETED | OUTPATIENT
Start: 2023-06-02 | End: 2023-06-05

## 2023-06-02 RX ORDER — CHLORHEXIDINE GLUCONATE 213 G/1000ML
1 SOLUTION TOPICAL DAILY
Refills: 0 | Status: DISCONTINUED | OUTPATIENT
Start: 2023-06-02 | End: 2023-06-05

## 2023-06-02 RX ORDER — SODIUM CHLORIDE 9 MG/ML
100 INJECTION INTRAMUSCULAR; INTRAVENOUS; SUBCUTANEOUS
Refills: 0 | Status: DISCONTINUED | OUTPATIENT
Start: 2023-06-02 | End: 2023-06-05

## 2023-06-02 RX ADMIN — Medication 600 MILLIGRAM(S): at 06:03

## 2023-06-02 RX ADMIN — Medication 50 MILLIGRAM(S): at 06:03

## 2023-06-02 RX ADMIN — Medication 90 MILLIGRAM(S): at 06:03

## 2023-06-02 RX ADMIN — HEPARIN SODIUM 5000 UNIT(S): 5000 INJECTION INTRAVENOUS; SUBCUTANEOUS at 14:01

## 2023-06-02 RX ADMIN — CEFTRIAXONE 100 MILLIGRAM(S): 500 INJECTION, POWDER, FOR SOLUTION INTRAMUSCULAR; INTRAVENOUS at 06:04

## 2023-06-02 RX ADMIN — Medication 50 MILLIGRAM(S): at 17:47

## 2023-06-02 RX ADMIN — Medication 600 MILLIGRAM(S): at 17:47

## 2023-06-02 RX ADMIN — HEPARIN SODIUM 5000 UNIT(S): 5000 INJECTION INTRAVENOUS; SUBCUTANEOUS at 06:04

## 2023-06-02 NOTE — CONSULT NOTE ADULT - ASSESSMENT
82-year-old female with a past medical history of hypertension, aortic stenosis, diabetes, ESRD on dialysis Monday Wednesday Friday who is admitted to the hospital due to general weakness.    #SARS-CoV-2/COVID-19 infection  Symptom onset on monday prior to admission  HD stable, on  room air  CXR with clear lungs    #Abnormal imaging of the abdomen  #Exophytic renal mass  Concern for malignancy on imaging    #Leukopenia  #Thrombocytopenia    #Elevated inflammatory markers    Recommendations  Given age, ESRD, risk factors - can give three day course of remdesivir (metabolite will be removed through dialysis) for COVID infection and this is not a barrier to discharge if otherwise ready to go home  Supportive measures  If requires supplemental oxygen, extend remdesivir for a 5 day course and start dexamethasone 6mg daily  Would discontinue ceftriaxone as low evidence for ongoing UTI and no evidence for superimposed pneumonia  Follow pending blood cultures  Airborne precautions  Follow fever curve and WBC count    Prashanth Partida MD  Division of Infectious Diseases   82-year-old female with a past medical history of hypertension, aortic stenosis, diabetes, ESRD on dialysis Monday Wednesday Friday who is admitted to the hospital due to general weakness.    #SARS-CoV-2/COVID-19 infection  Symptom onset on monday prior to admission  HD stable, on  room air  CXR with clear lungs    #Abnormal imaging of the abdomen  #Exophytic renal mass  Concern for malignancy on imaging    #Leukopenia  #Thrombocytopenia    #Elevated inflammatory markers    Recommendations  Given age, ESRD, risk factors - can give three day course of remdesivir (metabolite will be removed through dialysis) for COVID infection and this is not a barrier to discharge if otherwise ready to go home  Supportive measures  If requires supplemental oxygen, extend remdesivir for a 5 day course and start dexamethasone 6mg daily  Would discontinue ceftriaxone as low evidence for ongoing UTI and no evidence for superimposed pneumonia  Follow pending blood cultures  Airborne precautions  Renal mass work-up per primary team  Follow fever curve and WBC count    Prashanth Partida MD  Division of Infectious Diseases

## 2023-06-02 NOTE — PROGRESS NOTE ADULT - SUBJECTIVE AND OBJECTIVE BOX
[facility] - S Suzi / Hieu S /D Daniel/ S Linh/ ZAIRA Herbert/ Aydin Greene / M Nicolasau/ O Yumiko  service -[phone], office [phone]  ---------------------------------------------------------------------------------------------------------------    Patient seen and examined bedside    Subjective and Objective: No overnight events, sob resolved. No complaints today. feeling better    Allergies: No Known Allergies      Hospital Medications:   MEDICATIONS  (STANDING):  atorvastatin 20 milliGRAM(s) Oral at bedtime  cefTRIAXone   IVPB 1000 milliGRAM(s) IV Intermittent every 24 hours  guaiFENesin  milliGRAM(s) Oral every 12 hours  heparin   Injectable 5000 Unit(s) SubCutaneous every 8 hours  metoprolol tartrate 50 milliGRAM(s) Oral two times a day  NIFEdipine XL 90 milliGRAM(s) Oral every 24 hours      REVIEW OF SYSTEMS:  CONSTITUTIONAL: No weakness, fevers or chills  EYES/ENT: No visual changes;  No vertigo or throat pain   NECK: No pain or stiffness  RESPIRATORY: No cough, wheezing, hemoptysis; No shortness of breath  CARDIOVASCULAR: No chest pain or palpitations.  GASTROINTESTINAL: No abdominal or epigastric pain. No nausea, vomiting, or hematemesis; No diarrhea or constipation. No melena or hematochezia.  GENITOURINARY: No dysuria, frequency, foamy urine, urinary urgency, incontinence or hematuria  NEUROLOGICAL: No numbness or weakness  SKIN: No itching, burning, rashes, or lesions   VASCULAR: No bilateral lower extremity edema.   All other review of systems is negative unless indicated above.    VITALS:  T(F): 97.4 (23 @ 14:04), Max: 98.2 (23 @ 23:18)  HR: 70 (23 @ 14:04)  BP: 142/56 (23 @ 14:04)  RR: 18 (23 @ 14:04)  SpO2: 100% (23 @ 14:04)  Wt(kg): --     @ 07:01  -   @ 17:28  --------------------------------------------------------  IN: 480 mL / OUT: 0 mL / NET: 480 mL      Height (cm): 160 ( @ :04)  Weight (kg): 64.1 ( @ 06:04)  BMI (kg/m2): 25 ( @ 06:04)  BSA (m2): 1.67 ( @ 06:04)    PHYSICAL EXAM:  Constitutional: NAD  HEENT: anicteric sclera, oropharynx clear  Neck: No JVD  Respiratory: CTAB, no wheezes, rales or rhonchi  Cardiovascular: S1, S2, RRR  Gastrointestinal: BS+, soft, NT/ND  Extremities: No cyanosis or clubbing. No peripheral edema  Neurological: A/O x 3, no focal deficits  Psychiatric: Normal mood, normal affect  : No CVA tenderness. No recinos.   Skin: No rashes  Vascular Access:    LABS:      136  |  93<L>  |  34<H>  ----------------------------<  120<H>  3.7   |  26  |  6.62<H>    Ca    8.2<L>      2023 13:44  Phos  4.7       Mg     2.20         TPro  6.9  /  Alb  3.9  /  TBili  0.3  /  DBili      /  AST  41<H>  /  ALT  19  /  AlkPhos  66  05-31    Creatinine Trend: 6.62 <--, 5.16 <--                        10.1   3.32  )-----------( 77       ( 2023 13:44 )             32.6     Urine Studies:  Urinalysis Basic - ( 2023 07:48 )    Color: Yellow / Appearance: Slightly Turbid / S.014 / pH:   Gluc:  / Ketone: Negative  / Bili: Negative / Urobili: <2 mg/dL   Blood:  / Protein: 100 mg/dL / Nitrite: Negative   Leuk Esterase: Large / RBC: 5 /HPF / WBC 7 /HPF   Sq Epi:  / Non Sq Epi:  / Bacteria: Negative          RADIOLOGY & ADDITIONAL STUDIES:   [facility] - S Suzi / Hieu S /D Daniel/ S Linh/ S Piedad/ Aydin Greene / M Orly/ O Yumiko  service -[phone], office [phone]  ---------------------------------------------------------------------------------------------------------------    Patient seen and examined bedside    Subjective and Objective: No overnight events, denied cp/ sob. No complaints today.    Allergies: No Known Allergies      Hospital Medications:   MEDICATIONS  (STANDING):  atorvastatin 20 milliGRAM(s) Oral at bedtime  cefTRIAXone   IVPB 1000 milliGRAM(s) IV Intermittent every 24 hours  guaiFENesin  milliGRAM(s) Oral every 12 hours  heparin   Injectable 5000 Unit(s) SubCutaneous every 8 hours  metoprolol tartrate 50 milliGRAM(s) Oral two times a day  NIFEdipine XL 90 milliGRAM(s) Oral every 24 hours    VITALS:  T(F): 97.4 (23 @ 14:04), Max: 98.2 (23 @ 23:18)  HR: 70 (23 @ 14:04)  BP: 142/56 (23 @ 14:04)  RR: 18 (23 @ 14:04)  SpO2: 100% (23 @ 14:04)  Wt(kg): --     @ 07:01  -   @ 17:28  --------------------------------------------------------  IN: 480 mL / OUT: 0 mL / NET: 480 mL      Height (cm): 160 ( @ 06:04)  Weight (kg): 64.1 ( @ 06:04)  BMI (kg/m2): 25 ( @ 06:04)  BSA (m2): 1.67 ( @ 06:04)    PHYSICAL EXAM:  Constitutional: NAD  HEENT: anicteric sclera  Neck: No JVD  Respiratory: CTAB, no wheezes, rales or rhonchi  Cardiovascular: S1, S2, RRR  Gastrointestinal: BS+, soft, NT/ND  Extremities: no pedal edema b/l   Neurological: A/O x 3  Psychiatric: Normal mood, normal affect  : No recinos.   Vascular Access: avf+    LABS:      136  |  93<L>  |  34<H>  ----------------------------<  120<H>  3.7   |  26  |  6.62<H>    Ca    8.2<L>      2023 13:44  Phos  4.7     06-  Mg     2.20         TPro  6.9  /  Alb  3.9  /  TBili  0.3  /  DBili      /  AST  41<H>  /  ALT  19  /  AlkPhos  66  05-31    Creatinine Trend: 6.62 <--, 5.16 <--                        10.1   3.32  )-----------( 77       ( 2023 13:44 )             32.6     Urine Studies:  Urinalysis Basic - ( 2023 07:48 )    Color: Yellow / Appearance: Slightly Turbid / S.014 / pH:   Gluc:  / Ketone: Negative  / Bili: Negative / Urobili: <2 mg/dL   Blood:  / Protein: 100 mg/dL / Nitrite: Negative   Leuk Esterase: Large / RBC: 5 /HPF / WBC 7 /HPF   Sq Epi:  / Non Sq Epi:  / Bacteria: Negative          RADIOLOGY & ADDITIONAL STUDIES:

## 2023-06-02 NOTE — PROGRESS NOTE ADULT - SUBJECTIVE AND OBJECTIVE BOX
SUBJECTIVE / OVERNIGHT EVENTS: pt seen and examined, says she feels much better today  23     MEDICATIONS  (STANDING):  atorvastatin 20 milliGRAM(s) Oral at bedtime  chlorhexidine 2% Cloths 1 Application(s) Topical daily  guaiFENesin  milliGRAM(s) Oral every 12 hours  heparin   Injectable 5000 Unit(s) SubCutaneous every 8 hours  metoprolol tartrate 50 milliGRAM(s) Oral two times a day  NIFEdipine XL 90 milliGRAM(s) Oral every 24 hours  remdesivir  IVPB 200 milliGRAM(s) IV Intermittent every 24 hours  remdesivir  IVPB   IV Intermittent     MEDICATIONS  (PRN):  acetaminophen     Tablet .. 650 milliGRAM(s) Oral every 6 hours PRN Temp greater or equal to 38C (100.4F), Mild Pain (1 - 3)  albuterol/ipratropium for Nebulization 3 milliLiter(s) Nebulizer every 6 hours PRN Shortness of Breath and/or Wheezing  sodium chloride 0.9% Bolus. 100 milliLiter(s) IV Bolus every 5 minutes PRN SBP LESS THAN or EQUAL to 90 mmHg    T(C): 36.7 (23 @ 20:20), Max: 36.8 (23 @ 23:18)  HR: 70 (23 @ 20:20) (70 - 86)  BP: 127/64 (23 @ 20:20) (127/64 - 151/67)  RR: 18 (23 @ 20:20) (18 - 18)  SpO2: 100% (23 @ 14:04) (100% - 100%)    CAPILLARY BLOOD GLUCOSE      POCT Blood Glucose.: 109 mg/dL (2023 22:04)    I&O's Summary    2023 07:  -  2023 21:16  --------------------------------------------------------  IN: 920 mL / OUT: 0 mL / NET: 920 mL        Constitutional: No fever, fatigue  Skin: No rash.  Eyes: No recent vision problems or eye pain.  ENT: No congestion, ear pain, or sore throat.  Cardiovascular: No chest pain or palpation.  Respiratory: No cough, shortness of breath, congestion, or wheezing.  Gastrointestinal: No abdominal pain, nausea, vomiting, or diarrhea.  Genitourinary: No dysuria.  Musculoskeletal: No joint swelling.  Neurologic: No headache.    PHYSICAL EXAM:  GENERAL: NAD  EYES: EOMI, PERRLA  NECK: Supple, No JVD  CHEST/LUNG: dec breath sounds at  bases  HEART:  S1 , S2 +  ABDOMEN: soft, bs+  EXTREMITIES:  no edema  NEUROLOGY:alert awake follows commands      LABS:                        10.1   3.32  )-----------( 77       ( 2023 13:44 )             32.6     06-    136  |  93<L>  |  34<H>  ----------------------------<  120<H>  3.7   |  26  |  6.62<H>    Ca    8.2<L>      2023 13:44  Phos  4.7     06-  Mg     2.20     06-    TPro  6.9  /  Alb  3.9  /  TBili  0.3  /  DBili  x   /  AST  41<H>  /  ALT  19  /  AlkPhos  66  05-31    PT/INR - ( 31 May 2023 22:38 )   PT: 12.4 sec;   INR: 1.07 ratio         PTT - ( 31 May 2023 22:38 )  PTT:34.2 sec      Urinalysis Basic - ( 2023 07:48 )    Color: Yellow / Appearance: Slightly Turbid / S.014 / pH: x  Gluc: x / Ketone: Negative  / Bili: Negative / Urobili: <2 mg/dL   Blood: x / Protein: 100 mg/dL / Nitrite: Negative   Leuk Esterase: Large / RBC: 5 /HPF / WBC 7 /HPF   Sq Epi: x / Non Sq Epi: x / Bacteria: Negative        RADIOLOGY & ADDITIONAL TESTS:    Imaging Personally Reviewed:    Consultant(s) Notes Reviewed:      Care Discussed with Consultants/Other Providers:

## 2023-06-02 NOTE — CONSULT NOTE ADULT - SUBJECTIVE AND OBJECTIVE BOX
Patient is a 82y old  Female who presents with a chief complaint of malaise, COVID (2023 19:48)    HPI:  82-year-old female with a past medical history of hypertension, aortic stenosis, diabetes, ESRD on dialysis  who is admitted to the hospital due to general weakness.    Patient reports development of general malaise, chills and weakness on Monday prior to admission.  On Tuesday she presented to urgent care and was diagnosed with a UTI and started on cefuroxime.    Patient had followed up with dialysis on day of admission, completed her session however continued to feel weak and unwell..  Patient was then referred to present to the ED.    In the ED, patient is afebrile.  Otherwise hemodynamically stable on room air.  Labs show no leukocytosis, thrombocytopenia noted at 88.  BMP with elevated creatinine in setting of ESRD, CRP 43, ferritin 1700.  Urinalysis with 7 WBCs, more than 27 squamous cells–contaminated and unreliable.  Chest x-ray with clear lungs.  CT abdomen/pelvis shows atrophic kidneys, no hydronephrosis, symmetric enhancement and an exophytic mass concerning for malignancy.  RVP positive on admission with SARS-CoV-2 positive.  Blood cultures obtained on admission are negative to date–took antibiotics prior to hospital admission.  Patient started on ceftriaxone.    Patient seen examined today at bedside.  Denies any new complaints.  Does report cough with sputum production.  Reports sputum is whitish and foamy.  Denies abdominal pain, denies dysuria does states she makes a little bit of urine.      prior hospital charts reviewed [  ]  primary team notes reviewed [  ]  other consultant notes reviewed [  ]    PAST MEDICAL & SURGICAL HISTORY:  ESRD (end stage renal disease) on dialysis      DM (diabetes mellitus)  on insulin      Aortic stenosis  wishes to discuss TAVR, has Cardiology clearance      Hypertension      Gout      Diabetic retinopathy      Bilateral cataracts      Cough  hx of pt on inhaler under Pulmonology  care , last time used 2020      H/O syncope  2020- pt hospitalized , cardiac workup done , HD started loop recorder done      Refusal of blood transfusions as patient is Yazidism      Fibroids  hx of      HLD (hyperlipidemia)      AV fistula  left arm- 2020      History of hysterectomy  1987      History of vascular access device  right chest permacath- 2020      History of loop recorder  left chest - 2020          Allergies  No Known Allergies    ANTIMICROBIALS (past 90 days)  MEDICATIONS  (STANDING):  cefTRIAXone   IVPB   100 mL/Hr IV Intermittent (23 @ 04:57)    cefTRIAXone   IVPB   100 mL/Hr IV Intermittent (23 @ 06:04)        cefTRIAXone   IVPB 1000 every 24 hours    MEDICATIONS  (STANDING):  acetaminophen     Tablet .. 650 every 6 hours PRN  albuterol/ipratropium for Nebulization 3 every 6 hours PRN  atorvastatin 20 at bedtime  guaiFENesin  every 12 hours  heparin   Injectable 5000 every 8 hours  metoprolol tartrate 50 two times a day  NIFEdipine XL 90 every 24 hours    SOCIAL HISTORY:       FAMILY HISTORY:  Family history of heart disease  mother, father, sisters-     Family history of hypertension  mother, father, sisiter-     Family history of diabetes mellitus  sister -       REVIEW OF SYSTEMS  [  ] ROS unobtainable because:    [  ] All other systems negative except as noted below:	    Constitutional:  [ ] fever [ ] chills  [ ] weight loss  [ ] weakness  Skin:  [ ] rash [ ] phlebitis	  Eyes: [ ] icterus [ ] pain  [ ] discharge	  ENMT: [ ] sore throat  [ ] thrush [ ] ulcers [ ] exudates  Respiratory: [ ] dyspnea [ ] hemoptysis [ ] cough [ ] sputum	  Cardiovascular:  [ ] chest pain [ ] palpitations [ ] edema	  Gastrointestinal:  [ ] nausea [ ] vomiting [ ] diarrhea [ ] constipation [ ] pain	  Genitourinary:  [ ] dysuria [ ] frequency [ ] hematuria [ ] discharge [ ] flank pain  [ ] incontinence  Musculoskeletal:  [ ] myalgias [ ] arthralgias [ ] arthritis  [ ] back pain  Neurological:  [ ] headache [ ] seizures  [ ] confusion/altered mental status  Psychiatric:  [ ] anxiety [ ] depression	  Hematology/Lymphatics:  [ ] lymphadenopathy  Endocrine:  [ ] adrenal [ ] thyroid  Allergic/Immunologic:	 [ ] transplant [ ] seasonal    Vital Signs Last 24 Hrs  T(F): 97.4 (23 @ 14:04), Max: 101 (23 @ 20:31)  Vital Signs Last 24 Hrs  HR: 70 (23 @ 14:04) (70 - 87)  BP: 142/56 (23 @ 14:04) (142/56 - 151/67)  RR: 18 (23 @ 14:04)  SpO2: 100% (23 @ 14:04) (100% - 100%)  Wt(kg): --    PHYSICAL EXAM:  Constitutional: non-toxic, no distress  HEAD/EYES: anicteric, no conjunctival injection  ENT:  supple, no thrush  Cardiovascular:   normal S1, S2, no murmur, no edema  Respiratory:  clear BS bilaterally, no wheezes, no rales  GI:  soft, non-tender, normal bowel sounds  :  no recinos, no CVA tenderness  Musculoskeletal:  no synovitis, normal ROM  Neurologic: awake and alert, normal strength, no focal findings  Skin:  no rash, no erythema, no phlebitis  Heme/Onc: no lymphadenopathy   Psychiatric:  awake, alert, appropriate mood                            10.1   3.32  )-----------( 77       ( 2023 13:44 )             32.6   06-    136  |  93<L>  |  34<H>  ----------------------------<  120<H>  3.7   |  26  |  6.62<H>    Ca    8.2<L>      2023 13:44  Phos  4.7     06-  Mg     2.20     -    TPro  6.9  /  Alb  3.9  /  TBili  0.3  /  DBili  x   /  AST  41<H>  /  ALT  19  /  AlkPhos  66      Urinalysis Basic - ( 2023 07:48 )    Color: Yellow / Appearance: Slightly Turbid / S.014 / pH: x  Gluc: x / Ketone: Negative  / Bili: Negative / Urobili: <2 mg/dL   Blood: x / Protein: 100 mg/dL / Nitrite: Negative   Leuk Esterase: Large / RBC: 5 /HPF / WBC 7 /HPF   Sq Epi: x / Non Sq Epi: x / Bacteria: Negative    MICROBIOLOGY:  Culture - Blood (collected 31 May 2023 22:35)  Source: .Blood Blood-Peripheral  Preliminary Report (2023 03:02):    No growth to date.    Culture - Blood (collected 31 May 2023 22:23)  Source: .Blood Blood-Venous  Preliminary Report (2023 03:02):    No growth to date.              Rapid RVP Result: Detected ( @ 22:09)      RADIOLOGY:  imaging below personally reviewed and agree with findings Patient is a 82y old  Female who presents with a chief complaint of malaise, COVID (2023 19:48)    HPI:  82-year-old female with a past medical history of hypertension, aortic stenosis, diabetes, ESRD on dialysis  who is admitted to the hospital due to general weakness.    Patient reports development of general malaise, chills and weakness on Monday prior to admission.  On Tuesday she presented to urgent care and was diagnosed with a UTI and started on cefuroxime.    Patient had followed up with dialysis on day of admission, completed her session however continued to feel weak and unwell..  Patient was then referred to present to the ED.    In the ED, patient is afebrile.  Otherwise hemodynamically stable on room air.  Labs show no leukocytosis, thrombocytopenia noted at 88.  BMP with elevated creatinine in setting of ESRD, CRP 43, ferritin 1700.  Urinalysis with 7 WBCs, more than 27 squamous cells–contaminated and unreliable.  Chest x-ray with clear lungs.  CT abdomen/pelvis shows atrophic kidneys, no hydronephrosis, symmetric enhancement and an exophytic mass concerning for malignancy.  RVP positive on admission with SARS-CoV-2 positive.  Blood cultures obtained on admission are negative to date–took antibiotics prior to hospital admission.  Patient started on ceftriaxone.    Patient seen examined today at bedside.  Denies any new complaints.  Does report cough with sputum production.  Reports sputum is whitish and foamy.  Denies abdominal pain, denies dysuria does states she makes a little bit of urine.      prior hospital charts reviewed [ x ]  primary team notes reviewed [  x]  other consultant notes reviewed [ x ]    PAST MEDICAL & SURGICAL HISTORY:  ESRD (end stage renal disease) on dialysis      DM (diabetes mellitus)  on insulin      Aortic stenosis  wishes to discuss TAVR, has Cardiology clearance      Hypertension      Gout      Diabetic retinopathy      Bilateral cataracts      Cough  hx of pt on inhaler under Pulmonology  care , last time used 2020      H/O syncope  2020- pt hospitalized , cardiac workup done , HD started loop recorder done      Refusal of blood transfusions as patient is Buddhist      Fibroids  hx of      HLD (hyperlipidemia)      AV fistula  left arm- 2020      History of hysterectomy  1987      History of vascular access device  right chest permacath- 2020      History of loop recorder  left chest - 2020          Allergies  No Known Allergies    ANTIMICROBIALS (past 90 days)  MEDICATIONS  (STANDING):  cefTRIAXone   IVPB   100 mL/Hr IV Intermittent (23 @ 04:57)    cefTRIAXone   IVPB   100 mL/Hr IV Intermittent (23 @ 06:04)        cefTRIAXone   IVPB 1000 every 24 hours    MEDICATIONS  (STANDING):  acetaminophen     Tablet .. 650 every 6 hours PRN  albuterol/ipratropium for Nebulization 3 every 6 hours PRN  atorvastatin 20 at bedtime  guaiFENesin  every 12 hours  heparin   Injectable 5000 every 8 hours  metoprolol tartrate 50 two times a day  NIFEdipine XL 90 every 24 hours    SOCIAL HISTORY:     Denies tobacco/alcohol/illicit drug use  FAMILY HISTORY:  Family history of heart disease  mother, father, sisters-     Family history of hypertension  mother, father, sisiter-     Family history of diabetes mellitus  sister -       REVIEW OF SYSTEMS  [  ] ROS unobtainable because:    [  x] All other systems negative except as noted below:	    Constitutional:  [ ] fever [ ] chills  [ ] weight loss  [ ] weakness  Skin:  [ ] rash [ ] phlebitis	  Eyes: [ ] icterus [ ] pain  [ ] discharge	  ENMT: [ ] sore throat  [ ] thrush [ ] ulcers [ ] exudates  Respiratory: [ ] dyspnea [ ] hemoptysis x[ ] cough [ x] sputum	  Cardiovascular:  [ ] chest pain [ ] palpitations [ ] edema	  Gastrointestinal:  [ ] nausea [ ] vomiting [ ] diarrhea [ ] constipation [ ] pain	  Genitourinary:  [ ] dysuria [ ] frequency [ ] hematuria [ ] discharge [ ] flank pain  [ ] incontinence  Musculoskeletal:  [ ] myalgias [ ] arthralgias [ ] arthritis  [ ] back pain  Neurological:  [ ] headache [ ] seizures  [ ] confusion/altered mental status  Psychiatric:  [ ] anxiety [ ] depression	  Hematology/Lymphatics:  [ ] lymphadenopathy  Endocrine:  [ ] adrenal [ ] thyroid  Allergic/Immunologic:	 [ ] transplant [ ] seasonal    Vital Signs Last 24 Hrs  T(F): 97.4 (23 @ 14:04), Max: 101 (23 @ 20:31)  Vital Signs Last 24 Hrs  HR: 70 (23 @ 14:04) (70 - 87)  BP: 142/56 (23 @ 14:04) (142/56 - 151/67)  RR: 18 (23 @ 14:04)  SpO2: 100% (23 @ 14:04) (100% - 100%)  Wt(kg): --    PHYSICAL EXAM:  Constitutional: Comfortable, no distress, RA  HEAD/EYES: anicteric, no conjunctival injection  ENT:  supple, no thrush  Cardiovascular:   normal S1, S2, no murmur, no edema  Respiratory:  clear BS bilaterally, no wheezes, no rales  GI:  soft, non-tender, normal bowel sounds  :  no recinos, no CVA tenderness  Musculoskeletal:  no synovitis, normal ROM  Neurologic: awake and alert, normal strength, no focal findings  Skin:  no phlebitis  Heme/Onc: no lymphadenopathy   Psychiatric:  awake, alert, appropriate mood                            10.1   3.32  )-----------( 77       ( 2023 13:44 )             32.6   06-    136  |  93<L>  |  34<H>  ----------------------------<  120<H>  3.7   |  26  |  6.62<H>    Ca    8.2<L>      2023 13:44  Phos  4.7     06-  Mg     2.20     -    TPro  6.9  /  Alb  3.9  /  TBili  0.3  /  DBili  x   /  AST  41<H>  /  ALT  19  /  AlkPhos  66      Urinalysis Basic - ( 2023 07:48 )    Color: Yellow / Appearance: Slightly Turbid / S.014 / pH: x  Gluc: x / Ketone: Negative  / Bili: Negative / Urobili: <2 mg/dL   Blood: x / Protein: 100 mg/dL / Nitrite: Negative   Leuk Esterase: Large / RBC: 5 /HPF / WBC 7 /HPF   Sq Epi: x / Non Sq Epi: x / Bacteria: Negative    MICROBIOLOGY:  Culture - Blood (collected 31 May 2023 22:35)  Source: .Blood Blood-Peripheral  Preliminary Report (2023 03:02):    No growth to date.    Culture - Blood (collected 31 May 2023 22:23)  Source: .Blood Blood-Venous  Preliminary Report (2023 03:02):    No growth to date.              Rapid RVP Result: Detected ( @ 22:09)      RADIOLOGY:  imaging below personally reviewed and agree with findings

## 2023-06-03 ENCOUNTER — TRANSCRIPTION ENCOUNTER (OUTPATIENT)
Age: 83
End: 2023-06-03

## 2023-06-03 LAB
ANION GAP SERPL CALC-SCNC: 17 MMOL/L — HIGH (ref 7–14)
BUN SERPL-MCNC: 30 MG/DL — HIGH (ref 7–23)
CALCIUM SERPL-MCNC: 9.1 MG/DL — SIGNIFICANT CHANGE UP (ref 8.4–10.5)
CHLORIDE SERPL-SCNC: 94 MMOL/L — LOW (ref 98–107)
CO2 SERPL-SCNC: 26 MMOL/L — SIGNIFICANT CHANGE UP (ref 22–31)
CREAT SERPL-MCNC: 6 MG/DL — HIGH (ref 0.5–1.3)
EGFR: 7 ML/MIN/1.73M2 — LOW
GLUCOSE BLDC GLUCOMTR-MCNC: 113 MG/DL — HIGH (ref 70–99)
GLUCOSE SERPL-MCNC: 90 MG/DL — SIGNIFICANT CHANGE UP (ref 70–99)
HBV CORE IGM SER-ACNC: SIGNIFICANT CHANGE UP
HBV SURFACE AB SER-ACNC: 62.7 MIU/ML — SIGNIFICANT CHANGE UP
HBV SURFACE AG SER-ACNC: SIGNIFICANT CHANGE UP
HCT VFR BLD CALC: 33.2 % — LOW (ref 34.5–45)
HCV AB S/CO SERPL IA: 0.08 S/CO — SIGNIFICANT CHANGE UP (ref 0–0.99)
HCV AB SERPL-IMP: SIGNIFICANT CHANGE UP
HGB BLD-MCNC: 10.8 G/DL — LOW (ref 11.5–15.5)
MAGNESIUM SERPL-MCNC: 2.1 MG/DL — SIGNIFICANT CHANGE UP (ref 1.6–2.6)
MCHC RBC-ENTMCNC: 30.6 PG — SIGNIFICANT CHANGE UP (ref 27–34)
MCHC RBC-ENTMCNC: 32.5 GM/DL — SIGNIFICANT CHANGE UP (ref 32–36)
MCV RBC AUTO: 94.1 FL — SIGNIFICANT CHANGE UP (ref 80–100)
MRSA PCR RESULT.: SIGNIFICANT CHANGE UP
NRBC # BLD: 0 /100 WBCS — SIGNIFICANT CHANGE UP (ref 0–0)
NRBC # FLD: 0 K/UL — SIGNIFICANT CHANGE UP (ref 0–0)
PHOSPHATE SERPL-MCNC: 3.9 MG/DL — SIGNIFICANT CHANGE UP (ref 2.5–4.5)
PLATELET # BLD AUTO: 87 K/UL — LOW (ref 150–400)
POTASSIUM SERPL-MCNC: 3.8 MMOL/L — SIGNIFICANT CHANGE UP (ref 3.5–5.3)
POTASSIUM SERPL-SCNC: 3.8 MMOL/L — SIGNIFICANT CHANGE UP (ref 3.5–5.3)
RBC # BLD: 3.53 M/UL — LOW (ref 3.8–5.2)
RBC # FLD: 16.2 % — HIGH (ref 10.3–14.5)
S AUREUS DNA NOSE QL NAA+PROBE: SIGNIFICANT CHANGE UP
SODIUM SERPL-SCNC: 137 MMOL/L — SIGNIFICANT CHANGE UP (ref 135–145)
WBC # BLD: 3.97 K/UL — SIGNIFICANT CHANGE UP (ref 3.8–10.5)
WBC # FLD AUTO: 3.97 K/UL — SIGNIFICANT CHANGE UP (ref 3.8–10.5)

## 2023-06-03 RX ORDER — REMDESIVIR 5 MG/ML
100 INJECTION INTRAVENOUS EVERY 24 HOURS
Refills: 0 | Status: COMPLETED | OUTPATIENT
Start: 2023-06-04 | End: 2023-06-05

## 2023-06-03 RX ADMIN — CHLORHEXIDINE GLUCONATE 1 APPLICATION(S): 213 SOLUTION TOPICAL at 09:50

## 2023-06-03 RX ADMIN — REMDESIVIR 200 MILLIGRAM(S): 5 INJECTION INTRAVENOUS at 05:09

## 2023-06-03 RX ADMIN — ATORVASTATIN CALCIUM 20 MILLIGRAM(S): 80 TABLET, FILM COATED ORAL at 22:12

## 2023-06-03 RX ADMIN — Medication 90 MILLIGRAM(S): at 06:12

## 2023-06-03 RX ADMIN — Medication 600 MILLIGRAM(S): at 06:13

## 2023-06-03 RX ADMIN — Medication 600 MILLIGRAM(S): at 17:40

## 2023-06-03 RX ADMIN — Medication 50 MILLIGRAM(S): at 06:12

## 2023-06-03 RX ADMIN — HEPARIN SODIUM 5000 UNIT(S): 5000 INJECTION INTRAVENOUS; SUBCUTANEOUS at 00:21

## 2023-06-03 RX ADMIN — HEPARIN SODIUM 5000 UNIT(S): 5000 INJECTION INTRAVENOUS; SUBCUTANEOUS at 09:49

## 2023-06-03 RX ADMIN — HEPARIN SODIUM 5000 UNIT(S): 5000 INJECTION INTRAVENOUS; SUBCUTANEOUS at 17:37

## 2023-06-03 RX ADMIN — Medication 50 MILLIGRAM(S): at 17:40

## 2023-06-03 RX ADMIN — ATORVASTATIN CALCIUM 20 MILLIGRAM(S): 80 TABLET, FILM COATED ORAL at 00:23

## 2023-06-03 NOTE — PROGRESS NOTE ADULT - NSPROGADDITIONALINFOA_GEN_ALL_CORE
Dr Sanchez  Nephrology Attending  [facility]  office [phone]  ans serv- [phone]  ms Team-Jasmine

## 2023-06-03 NOTE — DISCHARGE NOTE PROVIDER - NSDCFUSCHEDAPPT_GEN_ALL_CORE_FT
[facility] [phone]  Scheduled Appointment: 06/14/2023

## 2023-06-03 NOTE — DISCHARGE NOTE PROVIDER - NSDCCPTREATMENT_GEN_ALL_CORE_FT
PRINCIPAL PROCEDURE  Procedure: Abdomen CT  Findings and Treatment: IMPRESSION:  Atrophic kidneys. No hydronephrosis or secondary signs of pyelonephritis.   Indeterminate presumably enhancing left renal mass concerning for   malignancy. Dedicated imaging with contrast enhanced renal MRI would be   of benefit.  Diverticulosis. No evidence of diverticulitis.

## 2023-06-03 NOTE — PROGRESS NOTE ADULT - SUBJECTIVE AND OBJECTIVE BOX
Pt wife states 21 mcg Nicotine patch was not sent to the pharmacy. MAGDIEL GUERRIER  82y  Female      Patient is a 82y old  Female who presents with a chief complaint of malaise, COVID (03 Jun 2023 13:51)  Patient was seen and examined.chart reviewed.comfortable,nad    REVIEW OF SYSTEMS:  CONSTITUTIONAL: No fever  RESPIRATORY: No cough, hemoptysis or shortness of breath  CARDIOVASCULAR: No chest pain, palpitations, dizziness, or leg swelling  GASTROINTESTINAL: No abdominal pain. nausea, vomiting, hematemesis  GENITOURINARY: No dysuria, frequency, hematuria   NEUROLOGICAL: No headaches, no dizziness  MUSCULOSKELETAL: No joint pain or swelling;     INTERVAL HPI/OVERNIGHT EVENTS:  T(C): 37.1 (06-03-23 @ 16:40), Max: 37.1 (06-03-23 @ 16:40)  HR: 80 (06-03-23 @ 16:40) (60 - 80)  BP: 160/63 (06-03-23 @ 16:40) (145/67 - 160/63)  RR: 18 (06-03-23 @ 16:40) (18 - 18)  SpO2: 100% (06-03-23 @ 16:40) (100% - 100%)  Wt(kg): --  I&O's Summary    02 Jun 2023 07:01  -  03 Jun 2023 07:00  --------------------------------------------------------  IN: 1320 mL / OUT: 1900 mL / NET: -580 mL    03 Jun 2023 07:01  -  03 Jun 2023 22:22  --------------------------------------------------------  IN: 720 mL / OUT: 0 mL / NET: 720 mL      T(C): 37.1 (06-03-23 @ 16:40), Max: 37.1 (06-03-23 @ 16:40)  HR: 80 (06-03-23 @ 16:40) (60 - 80)  BP: 160/63 (06-03-23 @ 16:40) (145/67 - 160/63)  RR: 18 (06-03-23 @ 16:40) (18 - 18)  SpO2: 100% (06-03-23 @ 16:40) (100% - 100%)  Wt(kg): --Vital Signs Last 24 Hrs  T(C): 37.1 (03 Jun 2023 16:40), Max: 37.1 (03 Jun 2023 16:40)  T(F): 98.8 (03 Jun 2023 16:40), Max: 98.8 (03 Jun 2023 16:40)  HR: 80 (03 Jun 2023 16:40) (60 - 80)  BP: 160/63 (03 Jun 2023 16:40) (145/67 - 160/63)  BP(mean): --  RR: 18 (03 Jun 2023 16:40) (18 - 18)  SpO2: 100% (03 Jun 2023 16:40) (100% - 100%)    Parameters below as of 03 Jun 2023 16:40  Patient On (Oxygen Delivery Method): room air        LABS:                        10.8   3.97  )-----------( 87       ( 03 Jun 2023 04:47 )             33.2     06-03    137  |  94<L>  |  30<H>  ----------------------------<  90  3.8   |  26  |  6.00<H>    Ca    9.1      03 Jun 2023 05:01  Phos  3.9     06-03  Mg     2.10     06-03          CAPILLARY BLOOD GLUCOSE      POCT Blood Glucose.: 113 mg/dL (03 Jun 2023 00:27)            PAST MEDICAL & SURGICAL HISTORY:  ESRD (end stage renal disease) on dialysis      DM (diabetes mellitus)  on insulin      Aortic stenosis  wishes to discuss TAVR, has Cardiology clearance      Hypertension      Gout      Diabetic retinopathy      Bilateral cataracts      Cough  hx of pt on inhaler under Pulmonology  care , last time used November 2020      H/O syncope  09/19/2020- pt hospitalized , cardiac workup done , HD started loop recorder done      Refusal of blood transfusions as patient is [employer]      Fibroids  hx of      HLD (hyperlipidemia)      AV fistula  left arm- 9/21/2020      History of hysterectomy  1987      History of vascular access device  right chest permacath- 9/19/2020      History of loop recorder  left chest - 9/22/2020          MEDICATIONS  (STANDING):  atorvastatin 20 milliGRAM(s) Oral at bedtime  chlorhexidine 2% Cloths 1 Application(s) Topical daily  guaiFENesin  milliGRAM(s) Oral every 12 hours  heparin   Injectable 5000 Unit(s) SubCutaneous every 8 hours  metoprolol tartrate 50 milliGRAM(s) Oral two times a day  NIFEdipine XL 90 milliGRAM(s) Oral every 24 hours  remdesivir  IVPB   IV Intermittent     MEDICATIONS  (PRN):  acetaminophen     Tablet .. 650 milliGRAM(s) Oral every 6 hours PRN Temp greater or equal to 38C (100.4F), Mild Pain (1 - 3)  albuterol/ipratropium for Nebulization 3 milliLiter(s) Nebulizer every 6 hours PRN Shortness of Breath and/or Wheezing  sodium chloride 0.9% Bolus. 100 milliLiter(s) IV Bolus every 5 minutes PRN SBP LESS THAN or EQUAL to 90 mmHg        RADIOLOGY & ADDITIONAL TESTS:    Imaging Personally Reviewed:  [ ] YES  [ ] NO    Consultant(s) Notes Reviewed:  [ x] YES  [ ] NO    PHYSICAL EXAM:  GENERAL: Alert and awake lying in bed in no distress  HEAD:  Atraumatic, Normocephalic  EYES: EOMI, SHIRLENE, conjunctiva and sclera clear  NECK: Supple, No JVD, Normal thyroid  NERVOUS SYSTEM:  Alert & Oriented X3, Motor and sensory systems are intact,   CHEST/LUNG: Bilateral clear breath sounds, no rhochi, no wheezing, no crepitations,  HEART: Regular rate and rhythm; No murmurs, rubs, or gallops  ABDOMEN: Soft, Nontender, Nondistended; Bowel sounds present  EXTREMITIES:   Peripheral Pulses are palpable, no  edema        Care Discussed with Consultants/Other Providers [ ] YES  [ ] NO      Code Status: [] Full Code [] DNR [] DNI [] Goals of Care:   Disposition: [] ICU [] Stroke Unit [] RCU []PCU []Floor [] Discharge Home         NEETA CasasFACP

## 2023-06-03 NOTE — PHYSICAL THERAPY INITIAL EVALUATION ADULT - ADDITIONAL COMMENTS
Pt states she lives in an apartment +elevator with her daughter. Prior to admission, pt was ambulating independently with a rolling walker.   Post PT evaluation, pt left semi-supine, alarm on, call bell and remote within reach, all precautions maintained, NAD. RN aware.

## 2023-06-03 NOTE — DISCHARGE NOTE PROVIDER - NSDCMRMEDTOKEN_GEN_ALL_CORE_FT
aspirin 81 mg oral tablet: 1 tab(s) orally once a day  atorvastatin 20 mg oral tablet: 1 tab(s) orally once a day  cefuroxime 500 mg oral tablet: 1 tab(s) orally 2 times a day (for 7 days, starting 5/30/23)  Metoprolol Tartrate 50 mg oral tablet: 1 tab(s) orally 2 times a day  NIFEdipine 90 mg oral tablet, extended release: 1 tab(s) orally 2 times a day   aspirin 81 mg oral tablet: 1 tab(s) orally once a day  atorvastatin 20 mg oral tablet: 1 tab(s) orally once a day  Metoprolol Tartrate 50 mg oral tablet: 1 tab(s) orally 2 times a day  NIFEdipine 90 mg oral tablet, extended release: 1 tab(s) orally 2 times a day   aspirin 81 mg oral tablet: 1 tab(s) orally once a day  atorvastatin 20 mg oral tablet: 1 tab(s) orally once a day  Metoprolol Tartrate 50 mg oral tablet: 1 tab(s) orally 2 times a day  NIFEdipine 90 mg oral tablet, extended release: 1 tab(s) orally once a day

## 2023-06-03 NOTE — DISCHARGE NOTE PROVIDER - NSDCCPCAREPLAN_GEN_ALL_CORE_FT
PRINCIPAL DISCHARGE DIAGNOSIS  Diagnosis: 2019 novel coronavirus disease (COVID-19)  Assessment and Plan of Treatment: You were seen in the hospital with COVID 19. You were treated with symptomatic management and Remdesivir which is an anti-viral agent. You are now feeling better and are being discharged from the hospital. Please continue to practice good hand hygeine and wear a mask around others until your symptoms fully resolve. Please follow up with your PCP in one week for continuity of care.         SECONDARY DISCHARGE DIAGNOSES  Diagnosis: ESRD on dialysis  Assessment and Plan of Treatment: Please continue to follow your dialysis schedule and refer to your primary provider/nephrologist for further care and monitoring of kidney function and electrolytes. Continue a renal restricted diet (Avoiding foods high in potassium and phosphorus), your prescribed medications, and supplementations as directed.      Diagnosis: Renal mass  Assessment and Plan of Treatment: You had a CAT scan which showed a left sided renal mass concerning for malignancy. It is very important that you follow up with your PCP and Nephrologist as outpatient for work up of this mass as it is concerning for malignancy (cancer). Please see your PCP/ Nephrologist in one week. It is very important that you please follow up.    Diagnosis: Hyperlipidemia  Assessment and Plan of Treatment: Continue prescribed medications to control your cholesterol levels and a DASH (Low fat/salt) diet. Follow up with your primary care provider upon discharge for further management and monitoring of cholesterol levels.      Diagnosis: Hypertension  Assessment and Plan of Treatment: Continue blood pressure medication regimen as directed. Monitor for any visual changes, headaches or dizziness.  Monitor blood pressure regularly.  Follow up with your primary care provider for further management for high blood pressure.

## 2023-06-03 NOTE — DISCHARGE NOTE PROVIDER - CARE PROVIDER_API CALL
Benjamín Plata  Internal Medicine  [address] [place] [address]  Phone: [phone]  Fax: [phone]  Established Patient  Follow Up Time: 1 week    Lionel Sanchez  Nephrology  [address] [place] [address]  Phone: [phone]  Fax: [phone]  Follow Up Time: 1 week

## 2023-06-03 NOTE — DISCHARGE NOTE PROVIDER - HOSPITAL COURSE
82-year-old female with a history of HTN, Aortic stenosis, DM, ESRD on dialysis MWF who presents to the ED with general weakness and a urinary tract infection and found to be COVID+.     Hospital Course:    Pneumonia due to COVID-19 virus.   - since monday sore throat, cough, malaise, +COVID  - vaccinated x3, first time COVID infection  - spoke with ID fellow, remdemsivir is generally not recommended after 3-5 days  - given patient overall NAD, on room air, would do symptomatic support for now  - mucinex, duoneb prn, chest pt  - Pt treated with short course of Remdemsivir with improvement  - Outpatient follow up     Urinary tract infection.   - reportedly +UA outpt. +UA here though >27 epithelial cells  - s/p CTX in ED  - will complete 3 day course  - UCx and BCx NGTD  - CTX discontinued as per ID     Renal mass.   - on CT: Indeterminate presumably enhancing left renal mass concerning for malignancy  - new to patient  - outpt w/u given active COVID at this time.    ESRD on dialysis.   - on MWF dialysis  - nephro onboard.  - outpatient dialysis reinstated prior to discharge     Hypertension.   - need accurate med recs  - per surescript: pt is on nifedipine, lopressor. Per EMS paper, patient on hydralazine and isosorbide.   - attempted to call pharmacy but unable to get through  - c/w nifefiepine and lopressor for now.  - PCP follow up     Hyperlipidemia.   - c/w home lipitor 20.    On ___ this case was reviewed with  ____, the patient is medically stable and optimized for discharge. All medications were reviewed and prescriptions were sent to mutually agreed upon pharmacy. 82-year-old female with a history of HTN, Aortic stenosis, DM, ESRD on dialysis MWF who presents to the ED with general weakness and a urinary tract infection and found to be COVID+.     Hospital Course:    Pneumonia due to COVID-19 virus.   - since monday sore throat, cough, malaise, +COVID  - vaccinated x3, first time COVID infection  - spoke with ID fellow, remdemsivir is generally not recommended after 3-5 days  - given patient overall NAD, on room air, would do symptomatic support for now  - mucinex, duoneb prn, chest pt  - Pt treated with short course of Remdemsivir with improvement  - Outpatient follow up     Urinary tract infection.   - reportedly +UA outpt. +UA here though >27 epithelial cells  - s/p CTX in ED  - will complete 3 day course  - UCx and BCx NGTD  - CTX discontinued as per ID     Renal mass.   - on CT: Indeterminate presumably enhancing left renal mass concerning for malignancy  - new to patient  - outpt w/u given active COVID at this time.    ESRD on dialysis.   - on MWF dialysis  - nephro onboard.  - outpatient dialysis reinstated prior to discharge     Hypertension.   - need accurate med recs  - per surescript: pt is on nifedipine, lopressor. Per EMS paper, patient on hydralazine and isosorbide.   - attempted to call pharmacy but unable to get through  - c/w nifefiepine and lopressor for now.  - PCP follow up     Hyperlipidemia.   - c/w home lipitor 20.    On 6/5/2023 this case was reviewed with Dr. Casas/Dr. Villarreal, the patient is medically stable and optimized for discharge. All medications were reviewed and prescriptions were sent to mutually agreed upon pharmacy.

## 2023-06-03 NOTE — PROGRESS NOTE ADULT - SUBJECTIVE AND OBJECTIVE BOX
[facility] : Ans Serv [phone], Office [phone]  Dr Sanchez/Dr Archer  /Dr Ortiz hu /Dr ZAIRA Melton/Dr Kiko Herbert/Dr Aydin Greene /Dr AKIKO Villalba  _______________________________________________________________________________________________    seen and examined today for End Stage Renal Disease on Dialysis   Interval :due for hemodialysis today  VITALS:  T(F): 98.1 (06-03 @ 06:11), Max: 98.6 (06-01 @ 12:04)  HR: 77 (06-03 @ 06:11)  BP: 153/60 (06-03 @ 06:11)  ABP: --  RR: 18 (06-03 @ 06:11)  SpO2: 100% (06-03 @ 06:11)    06-02 @ 07:01  -  06-03 @ 07:00  --------------------------------------------------------  IN: 1320 mL / OUT: 1900 mL / NET: -580 mL      Physical Exam :-  Constitutional: NAD  Respiratory: Bilateral equal breath sounds.  Cardiovascular: S1, S2 normal, positive Murmur  Gastrointestinal: Bowel Sounds present, soft  Extremities: no Edema Feet    Data:-  Allergies :   No Known Allergies    Hospital Medications:   MEDICATIONS  (STANDING):  atorvastatin 20 milliGRAM(s) Oral at bedtime  chlorhexidine 2% Cloths 1 Application(s) Topical daily  guaiFENesin  milliGRAM(s) Oral every 12 hours  heparin   Injectable 5000 Unit(s) SubCutaneous every 8 hours  metoprolol tartrate 50 milliGRAM(s) Oral two times a day  NIFEdipine XL 90 milliGRAM(s) Oral every 24 hours  remdesivir  IVPB   IV Intermittent     06-03    137  |  94<L>  |  30<H>  ----------------------------<  90  3.8   |  26  |  6.00<H>    Ca    9.1      03 Jun 2023 05:01  Phos  3.9     06-03  Mg     2.10     06-03      Creatinine Trend: 6.00 <--, 8.59 <--, 6.62 <--, 5.16 <--  egfr trend : 7 <--, 4 <--, 6 <--, 8 <--                        10.8   3.97  )-----------( 87       ( 03 Jun 2023 04:47 )             33.2

## 2023-06-03 NOTE — DISCHARGE NOTE PROVIDER - PROVIDER TOKENS
PROVIDER:[TOKEN:[[id number]],FOLLOWUP:[1 week],ESTABLISHEDPATIENT:[T]],PROVIDER:[TOKEN:[[id number]],FOLLOWUP:[1 week]]

## 2023-06-04 LAB
ANION GAP SERPL CALC-SCNC: 18 MMOL/L — HIGH (ref 7–14)
BUN SERPL-MCNC: 56 MG/DL — HIGH (ref 7–23)
CALCIUM SERPL-MCNC: 9.1 MG/DL — SIGNIFICANT CHANGE UP (ref 8.4–10.5)
CHLORIDE SERPL-SCNC: 95 MMOL/L — LOW (ref 98–107)
CO2 SERPL-SCNC: 24 MMOL/L — SIGNIFICANT CHANGE UP (ref 22–31)
CREAT SERPL-MCNC: 8.33 MG/DL — HIGH (ref 0.5–1.3)
EGFR: 4 ML/MIN/1.73M2 — LOW
GLUCOSE SERPL-MCNC: 83 MG/DL — SIGNIFICANT CHANGE UP (ref 70–99)
HCT VFR BLD CALC: 34.4 % — LOW (ref 34.5–45)
HGB BLD-MCNC: 10.9 G/DL — LOW (ref 11.5–15.5)
MAGNESIUM SERPL-MCNC: 2.3 MG/DL — SIGNIFICANT CHANGE UP (ref 1.6–2.6)
MCHC RBC-ENTMCNC: 30.1 PG — SIGNIFICANT CHANGE UP (ref 27–34)
MCHC RBC-ENTMCNC: 31.7 GM/DL — LOW (ref 32–36)
MCV RBC AUTO: 95 FL — SIGNIFICANT CHANGE UP (ref 80–100)
NRBC # BLD: 0 /100 WBCS — SIGNIFICANT CHANGE UP (ref 0–0)
NRBC # FLD: 0 K/UL — SIGNIFICANT CHANGE UP (ref 0–0)
PHOSPHATE SERPL-MCNC: 6.4 MG/DL — HIGH (ref 2.5–4.5)
PLATELET # BLD AUTO: 107 K/UL — LOW (ref 150–400)
POTASSIUM SERPL-MCNC: 5 MMOL/L — SIGNIFICANT CHANGE UP (ref 3.5–5.3)
POTASSIUM SERPL-SCNC: 5 MMOL/L — SIGNIFICANT CHANGE UP (ref 3.5–5.3)
RBC # BLD: 3.62 M/UL — LOW (ref 3.8–5.2)
RBC # FLD: 16.2 % — HIGH (ref 10.3–14.5)
SODIUM SERPL-SCNC: 137 MMOL/L — SIGNIFICANT CHANGE UP (ref 135–145)
WBC # BLD: 4.2 K/UL — SIGNIFICANT CHANGE UP (ref 3.8–10.5)
WBC # FLD AUTO: 4.2 K/UL — SIGNIFICANT CHANGE UP (ref 3.8–10.5)

## 2023-06-04 RX ADMIN — Medication 600 MILLIGRAM(S): at 06:17

## 2023-06-04 RX ADMIN — Medication 50 MILLIGRAM(S): at 18:05

## 2023-06-04 RX ADMIN — CHLORHEXIDINE GLUCONATE 1 APPLICATION(S): 213 SOLUTION TOPICAL at 12:53

## 2023-06-04 RX ADMIN — HEPARIN SODIUM 5000 UNIT(S): 5000 INJECTION INTRAVENOUS; SUBCUTANEOUS at 18:06

## 2023-06-04 RX ADMIN — REMDESIVIR 200 MILLIGRAM(S): 5 INJECTION INTRAVENOUS at 06:16

## 2023-06-04 RX ADMIN — HEPARIN SODIUM 5000 UNIT(S): 5000 INJECTION INTRAVENOUS; SUBCUTANEOUS at 00:47

## 2023-06-04 RX ADMIN — ATORVASTATIN CALCIUM 20 MILLIGRAM(S): 80 TABLET, FILM COATED ORAL at 22:48

## 2023-06-04 RX ADMIN — Medication 50 MILLIGRAM(S): at 06:18

## 2023-06-04 RX ADMIN — Medication 600 MILLIGRAM(S): at 18:06

## 2023-06-04 RX ADMIN — HEPARIN SODIUM 5000 UNIT(S): 5000 INJECTION INTRAVENOUS; SUBCUTANEOUS at 09:27

## 2023-06-04 RX ADMIN — Medication 90 MILLIGRAM(S): at 06:18

## 2023-06-04 NOTE — PROGRESS NOTE ADULT - SUBJECTIVE AND OBJECTIVE BOX
MAGDIEL GUERREIR  82y  Female      Patient is a 82y old  Female who presents with a chief complaint of malaise, COVID (03 Jun 2023 18:22)  comfortable,nad.no sob,no cp,no fever    REVIEW OF SYSTEMS:  CONSTITUTIONAL: No fever  RESPIRATORY: No cough, hemoptysis or shortness of breath  CARDIOVASCULAR: No chest pain, palpitations, dizziness, or leg swelling  GASTROINTESTINAL: No abdominal pain. nausea, vomiting, hematemesis  GENITOURINARY: No dysuria, frequency, hematuria   NEUROLOGICAL: No headaches, no dizziness  MUSCULOSKELETAL: No joint pain or swelling;     INTERVAL HPI/OVERNIGHT EVENTS:  T(C): 36.7 (06-04-23 @ 06:10), Max: 36.8 (06-03-23 @ 22:42)  HR: 72 (06-04-23 @ 06:10) (72 - 75)  BP: 151/72 (06-04-23 @ 06:10) (151/72 - 155/77)  RR: 18 (06-04-23 @ 06:10) (18 - 18)  SpO2: 98% (06-04-23 @ 06:10) (98% - 99%)  Wt(kg): --  I&O's Summary    03 Jun 2023 07:01  -  04 Jun 2023 07:00  --------------------------------------------------------  IN: 720 mL / OUT: 0 mL / NET: 720 mL    04 Jun 2023 07:01  -  04 Jun 2023 17:20  --------------------------------------------------------  IN: 240 mL / OUT: 0 mL / NET: 240 mL      T(C): 36.7 (06-04-23 @ 06:10), Max: 36.8 (06-03-23 @ 22:42)  HR: 72 (06-04-23 @ 06:10) (72 - 75)  BP: 151/72 (06-04-23 @ 06:10) (151/72 - 155/77)  RR: 18 (06-04-23 @ 06:10) (18 - 18)  SpO2: 98% (06-04-23 @ 06:10) (98% - 99%)  Wt(kg): --Vital Signs Last 24 Hrs  T(C): 36.7 (04 Jun 2023 06:10), Max: 36.8 (03 Jun 2023 22:42)  T(F): 98 (04 Jun 2023 06:10), Max: 98.2 (03 Jun 2023 22:42)  HR: 72 (04 Jun 2023 06:10) (72 - 75)  BP: 151/72 (04 Jun 2023 06:10) (151/72 - 155/77)  BP(mean): --  RR: 18 (04 Jun 2023 06:10) (18 - 18)  SpO2: 98% (04 Jun 2023 06:10) (98% - 99%)    Parameters below as of 04 Jun 2023 06:10  Patient On (Oxygen Delivery Method): room air        LABS:                        10.9   4.20  )-----------( 107      ( 04 Jun 2023 06:40 )             34.4     06-04    137  |  95<L>  |  56<H>  ----------------------------<  83  5.0   |  24  |  8.33<H>    Ca    9.1      04 Jun 2023 06:40  Phos  6.4     06-04  Mg     2.30     06-04          CAPILLARY BLOOD GLUCOSE                PAST MEDICAL & SURGICAL HISTORY:  ESRD (end stage renal disease) on dialysis      DM (diabetes mellitus)  on insulin      Aortic stenosis  wishes to discuss TAVR, has Cardiology clearance      Hypertension      Gout      Diabetic retinopathy      Bilateral cataracts      Cough  hx of pt on inhaler under Pulmonology  care , last time used November 2020      H/O syncope  09/19/2020- pt hospitalized , cardiac workup done , HD started loop recorder done      Refusal of blood transfusions as patient is [employer]      Fibroids  hx of      HLD (hyperlipidemia)      AV fistula  left arm- 9/21/2020      History of hysterectomy  1987      History of vascular access device  right chest permacath- 9/19/2020      History of loop recorder  left chest - 9/22/2020          MEDICATIONS  (STANDING):  atorvastatin 20 milliGRAM(s) Oral at bedtime  chlorhexidine 2% Cloths 1 Application(s) Topical daily  guaiFENesin  milliGRAM(s) Oral every 12 hours  heparin   Injectable 5000 Unit(s) SubCutaneous every 8 hours  metoprolol tartrate 50 milliGRAM(s) Oral two times a day  NIFEdipine XL 90 milliGRAM(s) Oral every 24 hours  remdesivir  IVPB   IV Intermittent   remdesivir  IVPB 100 milliGRAM(s) IV Intermittent every 24 hours    MEDICATIONS  (PRN):  acetaminophen     Tablet .. 650 milliGRAM(s) Oral every 6 hours PRN Temp greater or equal to 38C (100.4F), Mild Pain (1 - 3)  albuterol/ipratropium for Nebulization 3 milliLiter(s) Nebulizer every 6 hours PRN Shortness of Breath and/or Wheezing  sodium chloride 0.9% Bolus. 100 milliLiter(s) IV Bolus every 5 minutes PRN SBP LESS THAN or EQUAL to 90 mmHg        RADIOLOGY & ADDITIONAL TESTS:    Imaging Personally Reviewed:  [ ] YES  [ ] NO    Consultant(s) Notes Reviewed:  [x ] YES  [ ] NO    PHYSICAL EXAM:  GENERAL: Alert and awake lying in bed in no distress  HEAD:  Atraumatic, Normocephalic  EYES: EOMI, SHIRLENE, conjunctiva and sclera clear  NECK: Supple, No JVD, Normal thyroid  NERVOUS SYSTEM:  Alert & Oriented X3, Motor and sensory systems are intact,   CHEST/LUNG: Bilateral clear breath sounds, no rhochi, no wheezing, no crepitations,  HEART: Regular rate and rhythm; No murmurs, rubs, or gallops  ABDOMEN: Soft, Nontender, Nondistended; Bowel sounds present  EXTREMITIES:   Peripheral Pulses are palpable, no  edema        Care Discussed with Consultants/Other Providers [ x] YES  [ ] NO      Code Status: [] Full Code [] DNR [] DNI [] Goals of Care:   Disposition: [] ICU [] Stroke Unit [] RCU []PCU []Floor [] Discharge Home         NEETA CasasFACP

## 2023-06-04 NOTE — PROGRESS NOTE ADULT - PROBLEM SELECTOR PLAN 1
since monday sore throat, cough, malaise, +COVID  -vaccinated x3, first time COVID infection  -spoke with ID fellow, remdemsivir is generally not recommended after 3-5 days  -given patient overall NAD, on room air, would do symptomatic support for now  -mucinex, duoneb prn, chest pt  -obtain baseline EKG
-c/w Remdesivir  -ID f/u noted  -Trend inflammatory markers
-c/w Remdesivir-complete the course  -ID f/u noted  -Trend inflammatory markers

## 2023-06-04 NOTE — PROGRESS NOTE ADULT - PROBLEM SELECTOR PLAN 2
reportedly +UA outpt. +UA here though >27 epithelial cells  -s/p CTX in ED  -f/u urine and blood cultures-neg
reportedly +UA outpt. +UA here though >27 epithelial cells  -s/p CTX in ED  -will complete 3 day course  -f/u urine and blood cultures
reportedly +UA outpt. +UA here though >27 epithelial cells  -s/p CTX in ED  -f/u urine and blood cultures

## 2023-06-04 NOTE — PROGRESS NOTE ADULT - PROBLEM SELECTOR PLAN 8
DVT ppx: heparin subq  Diet: renal, dash, carb consistent

## 2023-06-05 ENCOUNTER — TRANSCRIPTION ENCOUNTER (OUTPATIENT)
Age: 83
End: 2023-06-05

## 2023-06-05 VITALS
HEART RATE: 70 BPM | RESPIRATION RATE: 17 BRPM | WEIGHT: 168.65 LBS | SYSTOLIC BLOOD PRESSURE: 148 MMHG | DIASTOLIC BLOOD PRESSURE: 66 MMHG | TEMPERATURE: 98 F

## 2023-06-05 LAB
ANION GAP SERPL CALC-SCNC: 22 MMOL/L — HIGH (ref 7–14)
BUN SERPL-MCNC: 82 MG/DL — HIGH (ref 7–23)
CALCIUM SERPL-MCNC: 8.7 MG/DL — SIGNIFICANT CHANGE UP (ref 8.4–10.5)
CHLORIDE SERPL-SCNC: 93 MMOL/L — LOW (ref 98–107)
CO2 SERPL-SCNC: 19 MMOL/L — LOW (ref 22–31)
CREAT SERPL-MCNC: 10.47 MG/DL — HIGH (ref 0.5–1.3)
EGFR: 3 ML/MIN/1.73M2 — LOW
GLUCOSE BLDC GLUCOMTR-MCNC: 132 MG/DL — HIGH (ref 70–99)
GLUCOSE SERPL-MCNC: 96 MG/DL — SIGNIFICANT CHANGE UP (ref 70–99)
HCT VFR BLD CALC: 32.9 % — LOW (ref 34.5–45)
HGB BLD-MCNC: 10.6 G/DL — LOW (ref 11.5–15.5)
MAGNESIUM SERPL-MCNC: 2.3 MG/DL — SIGNIFICANT CHANGE UP (ref 1.6–2.6)
MCHC RBC-ENTMCNC: 30.5 PG — SIGNIFICANT CHANGE UP (ref 27–34)
MCHC RBC-ENTMCNC: 32.2 GM/DL — SIGNIFICANT CHANGE UP (ref 32–36)
MCV RBC AUTO: 94.8 FL — SIGNIFICANT CHANGE UP (ref 80–100)
NRBC # BLD: 0 /100 WBCS — SIGNIFICANT CHANGE UP (ref 0–0)
NRBC # FLD: 0 K/UL — SIGNIFICANT CHANGE UP (ref 0–0)
PHOSPHATE SERPL-MCNC: 7.6 MG/DL — HIGH (ref 2.5–4.5)
PLATELET # BLD AUTO: 123 K/UL — LOW (ref 150–400)
POTASSIUM SERPL-MCNC: 4.4 MMOL/L — SIGNIFICANT CHANGE UP (ref 3.5–5.3)
POTASSIUM SERPL-SCNC: 4.4 MMOL/L — SIGNIFICANT CHANGE UP (ref 3.5–5.3)
RBC # BLD: 3.47 M/UL — LOW (ref 3.8–5.2)
RBC # FLD: 16.5 % — HIGH (ref 10.3–14.5)
SODIUM SERPL-SCNC: 134 MMOL/L — LOW (ref 135–145)
WBC # BLD: 4.56 K/UL — SIGNIFICANT CHANGE UP (ref 3.8–10.5)
WBC # FLD AUTO: 4.56 K/UL — SIGNIFICANT CHANGE UP (ref 3.8–10.5)

## 2023-06-05 RX ORDER — NIFEDIPINE 30 MG
1 TABLET, EXTENDED RELEASE 24 HR ORAL
Qty: 0 | Refills: 0 | DISCHARGE

## 2023-06-05 RX ORDER — CEFUROXIME AXETIL 250 MG
1 TABLET ORAL
Refills: 0 | DISCHARGE

## 2023-06-05 RX ADMIN — Medication 90 MILLIGRAM(S): at 05:58

## 2023-06-05 RX ADMIN — HEPARIN SODIUM 5000 UNIT(S): 5000 INJECTION INTRAVENOUS; SUBCUTANEOUS at 09:21

## 2023-06-05 RX ADMIN — Medication 600 MILLIGRAM(S): at 05:58

## 2023-06-05 RX ADMIN — Medication 50 MILLIGRAM(S): at 05:59

## 2023-06-05 RX ADMIN — REMDESIVIR 200 MILLIGRAM(S): 5 INJECTION INTRAVENOUS at 05:56

## 2023-06-05 RX ADMIN — HEPARIN SODIUM 5000 UNIT(S): 5000 INJECTION INTRAVENOUS; SUBCUTANEOUS at 02:09

## 2023-06-05 NOTE — DISCHARGE NOTE NURSING/CASE MANAGEMENT/SOCIAL WORK - PATIENT PORTAL LINK FT
You can access the FollowMyHealth Patient Portal offered by [facility] by registering at the following website: [email or web address]. By joining [facility]’s FollowMyHealth portal, you will also be able to view your health information using other applications (apps) compatible with our system.

## 2023-06-05 NOTE — PROGRESS NOTE ADULT - REASON FOR ADMISSION
malaise, COVID

## 2023-06-05 NOTE — PROGRESS NOTE ADULT - ASSESSMENT
82-year-old female with a history of HTN, Aortic stenosis, DM, ESRD on dialysis MWF who presents to the ED with general weakness and a urinary tract infection and found to be COVID+. 
82-year-old female with a history of HTN, Aortic stenosis, DM, ESRD on dialysis MWF who presents to the ED with general weakness and a urinary tract infection. Renal following for ESRD Mx.     ESRD on HD  Consent in chart  HD center: [facility]  Access: AVF  Schedule MWF    plan:  plan for hemodialysis   Ultrafiltration on Dialysis as tolerated with blood pressure     anemia in ckd- Hb at goal.   renal diet  dose all meds for ESRD    Hypertension  Blood Pressure- mm of Hg (last 72 hrs)  HR: 77 (60 - 87)  BP: 153/60 (119/46 - 156/53)  ABP: --    Medications :  metoprolol tartrate 50 milliGRAM(s) Oral two times a day  NIFEdipine XL 90 milliGRAM(s) Oral every 24 hours    - Continue current medications  - plan to titrate anti hypertensive medication as needed  - low salt diet if not npo suggested    COVID +  Mx per team and Infectious disease specialist     
82-year-old female with a history of HTN, Aortic stenosis, DM, ESRD on dialysis MWF who presents to the ED with general weakness and a urinary tract infection. Renal following for ESRD Mx.     ESRD on HD  Consent in chart  [facility] \[facility].\""  Access: AVF  Schedule MWF    plan:  completed HD now, Rx sheet reviewed, net UF 1.5kg removed, tolerated well. 1 episode of low bp, responded well to NS 200ml bolus x2  Ultrafiltration on Dialysis as tolerated with blood pressure     anemia in ckd- Hb at goal.   renal diet  dose all meds for ESRD    Hypertension  Medications :  metoprolol tartrate 50 milliGRAM(s) Oral two times a day  NIFEdipine XL 90 milliGRAM(s) Oral every 24 hours    - Continue current medications  - plan to titrate anti hypertensive medication as needed  - low salt diet if not npo suggested    COVID +  Mx per team and Infectious disease specialist     will closely follow up.   poc d/w pt,  HD RN bedside  labs, chart reviewed  For any question, pl call:  Nephrology  Cell -[phone]  Office [phone]  Ans Serv [phone]  
82-year-old female with a history of HTN, Aortic stenosis, DM, ESRD on dialysis MWF who presents to the ED with general weakness and a urinary tract infection. Renal following for ESRD Mx.     ESRD on HD  Consent in chart  HD center: [facility]  Access: AVF  Schedule MWF    plan:  plan for HD tonight w/2k bath, uf 1.5kg as tolerated    Ultrafiltration on Dialysis as tolerated with blood pressure   anemia in ckd- Hb at goal.   renal diet  dose all meds for ESRD    HTN, controlled-bp stable.   c/w metoprolol tartrate 50 milliGRAM(s) Oral two times a day  NIFEdipine XL 90 milliGRAM(s) Oral every 24 hours    COVID +  Mx per team and Infectious disease specialist     will closely follow up.   poc d/w pt  labs, chart reviewed  For any question, pl call:  Nephrology  Cell -[phone]  Office [phone]  Ans Serv [phone]

## 2023-06-05 NOTE — PROVIDER CONTACT NOTE (OTHER) - BACKGROUND
patient has hx of ESRD on HD, dm, htn, AS, gout, cataracts, and hysterectomy
patient has hx of ESRD on HD, dm, htn, AS, gout, cataracts, and hysterectomy

## 2023-06-05 NOTE — DISCHARGE NOTE NURSING/CASE MANAGEMENT/SOCIAL WORK - NSDCPEFALRISK_GEN_ALL_CORE
For information on Fall & Injury Prevention, visit: [email or web address] OR  [email or web address] OR  https://www.cdc.gov/steadi/patient.html

## 2023-06-05 NOTE — PROGRESS NOTE ADULT - SUBJECTIVE AND OBJECTIVE BOX
[facility] - S Suzi / Hieu S /D Daniel/ S Linh/ S Piedad/ Aydin Greene / M Nicolasau/ O Yumiko  service [phone], office [phone]  ---------------------------------------------------------------------------------------------------------------    Patient seen and examined bedside    Subjective and Objective: No overnight events, sob resolved. No complaints today. feeling better    Allergies: No Known Allergies      Hospital Medications:   MEDICATIONS  (STANDING):  atorvastatin 20 milliGRAM(s) Oral at bedtime  chlorhexidine 2% Cloths 1 Application(s) Topical daily  guaiFENesin  milliGRAM(s) Oral every 12 hours  heparin   Injectable 5000 Unit(s) SubCutaneous every 8 hours  metoprolol tartrate 50 milliGRAM(s) Oral two times a day  NIFEdipine XL 90 milliGRAM(s) Oral every 24 hours      REVIEW OF SYSTEMS:  CONSTITUTIONAL: No weakness, fevers or chills  EYES/ENT: No visual changes;  No vertigo or throat pain   NECK: No pain or stiffness  RESPIRATORY: No cough, wheezing, hemoptysis; No shortness of breath  CARDIOVASCULAR: No chest pain or palpitations.  GASTROINTESTINAL: No abdominal or epigastric pain. No nausea, vomiting, or hematemesis; No diarrhea or constipation. No melena or hematochezia.  GENITOURINARY: No dysuria, frequency, foamy urine, urinary urgency, incontinence or hematuria  NEUROLOGICAL: No numbness or weakness  SKIN: No itching, burning, rashes, or lesions   VASCULAR: No bilateral lower extremity edema.   All other review of systems is negative unless indicated above.    VITALS:  T(F): 97.5 (23 @ 14:30), Max: 98.5 (23 @ 22:47)  HR: 70 (23 @ 14:30)  BP: 148/66 (23 @ 14:30)  RR: 17 (23 @ 14:30)  SpO2: 97% (23 @ 09:25)  Wt(kg): --     @ 07:01  -   @ 07:00  --------------------------------------------------------  IN: 360 mL / OUT: 0 mL / NET: 360 mL     @ 07:01  -   @ 15:26  --------------------------------------------------------  IN: 860 mL / OUT: 2300 mL / NET: -1440 mL          PHYSICAL EXAM:  Constitutional: NAD  HEENT: anicteric sclera, oropharynx clear  Neck: No JVD  Respiratory: CTAB, no wheezes, rales or rhonchi  Cardiovascular: S1, S2, RRR  Gastrointestinal: BS+, soft, NT/ND  Extremities: No cyanosis or clubbing. No peripheral edema  Neurological: A/O x 3, no focal deficits  Psychiatric: Normal mood, normal affect  : No CVA tenderness. No recinos.   Skin: No rashes  Vascular Access:    LABS:      134<L>  |  93<L>  |  82<H>  ----------------------------<  96  4.4   |  19<L>  |  10.47<H>    Ca    8.7      2023 04:21  Phos  7.6       Mg     2.30           Creatinine Trend: 10.47 <--, 8.33 <--, 6.00 <--, 8.59 <--, 6.62 <--, 5.16 <--                        10.6   4.56  )-----------( 123      ( 2023 04:21 )             32.9     Urine Studies:  Urinalysis Basic - ( 2023 07:48 )    Color: Yellow / Appearance: Slightly Turbid / S.014 / pH:   Gluc:  / Ketone: Negative  / Bili: Negative / Urobili: <2 mg/dL   Blood:  / Protein: 100 mg/dL / Nitrite: Negative   Leuk Esterase: Large / RBC: 5 /HPF / WBC 7 /HPF   Sq Epi:  / Non Sq Epi:  / Bacteria: Negative          RADIOLOGY & ADDITIONAL STUDIES:   [facility] - S Suzi / Hieu S /D Daniel/ S Linh/ S Piedad/ Aydin Greene / AKIKO Villlaba/ O Yumiko  service [phone], office [phone]  ---------------------------------------------------------------------------------------------------------------    Patient seen and examined bedside in hd unit    Subjective and Objective: No overnight events, denied sob. No complaints today. feeling better    Allergies: No Known Allergies      Hospital Medications:   MEDICATIONS  (STANDING):  atorvastatin 20 milliGRAM(s) Oral at bedtime  chlorhexidine 2% Cloths 1 Application(s) Topical daily  guaiFENesin  milliGRAM(s) Oral every 12 hours  heparin   Injectable 5000 Unit(s) SubCutaneous every 8 hours  metoprolol tartrate 50 milliGRAM(s) Oral two times a day  NIFEdipine XL 90 milliGRAM(s) Oral every 24 hours    VITALS:  T(F): 97.5 (23 @ 14:30), Max: 98.5 (23 @ 22:47)  HR: 70 (23 @ 14:30)  BP: 148/66 (23 @ 14:30)  RR: 17 (23 @ 14:30)  SpO2: 97% (23 @ 09:25)  Wt(kg): --     @ 07:01  -   @ 07:00  --------------------------------------------------------  IN: 360 mL / OUT: 0 mL / NET: 360 mL     @ 07:01  -   @ 15:26  --------------------------------------------------------  IN: 860 mL / OUT: 2300 mL / NET: -1440 mL      PHYSICAL EXAM:  Constitutional: NAD  HEENT: anicteric sclera  Neck: No JVD  Gastrointestinal: BS+, soft, NT/ND  Extremities: no pedal edema b/l   Neurological: A/O x 3  Psychiatric: Normal mood, normal affect  : No recinos.     LABS:      134<L>  |  93<L>  |  82<H>  ----------------------------<  96  4.4   |  19<L>  |  10.47<H>    Ca    8.7      2023 04:21  Phos  7.6     06-  Mg     2.30     -      Creatinine Trend: 10.47 <--, 8.33 <--, 6.00 <--, 8.59 <--, 6.62 <--, 5.16 <--                        10.6   4.56  )-----------( 123      ( 2023 04:21 )             32.9     Urine Studies:  Urinalysis Basic - ( 2023 07:48 )    Color: Yellow / Appearance: Slightly Turbid / S.014 / pH:   Gluc:  / Ketone: Negative  / Bili: Negative / Urobili: <2 mg/dL   Blood:  / Protein: 100 mg/dL / Nitrite: Negative   Leuk Esterase: Large / RBC: 5 /HPF / WBC 7 /HPF   Sq Epi:  / Non Sq Epi:  / Bacteria: Negative          RADIOLOGY & ADDITIONAL STUDIES:

## 2023-06-14 ENCOUNTER — NON-APPOINTMENT (OUTPATIENT)
Age: 83
End: 2023-06-14

## 2023-06-14 ENCOUNTER — APPOINTMENT (OUTPATIENT)
Dept: ELECTROPHYSIOLOGY | Facility: CLINIC | Age: 83
End: 2023-06-14
Payer: MEDICARE

## 2023-06-14 PROCEDURE — 93298 REM INTERROG DEV EVAL SCRMS: CPT

## 2023-06-14 PROCEDURE — G2066: CPT

## 2023-07-18 ENCOUNTER — APPOINTMENT (OUTPATIENT)
Dept: ELECTROPHYSIOLOGY | Facility: CLINIC | Age: 83
End: 2023-07-18
Payer: MEDICARE

## 2023-07-18 ENCOUNTER — NON-APPOINTMENT (OUTPATIENT)
Age: 83
End: 2023-07-18

## 2023-07-18 PROCEDURE — G2066: CPT

## 2023-07-18 PROCEDURE — 93298 REM INTERROG DEV EVAL SCRMS: CPT

## 2023-08-16 NOTE — ED PROVIDER NOTE - IV ALTEPLASE EXCL REL HIDDEN
Name: Froylan Schaeffer. : 1959      MRN: [id number]  Encounter Provider: MARJORIE Mercado  Encounter Date: 2023   Encounter department: [address] [place] [address]     1. Pulmonary embolism, unspecified chronicity, unspecified pulmonary embolism type, unspecified whether acute cor pulmonale present [facility])  Patient takes eliquis as prescribed. Patient has a follow-up with pulmonary today. 2. Thoracic aortic ectasia (HCC)  Denies any chest pain. Patient has a follow-up with cardiology this month. 3. Essential hypertension  /76   Continue to follow-up with nephrology. 4. Type 2 diabetes mellitus with stage 3a chronic kidney disease, without long-term current use of insulin ([address])  -     IRIS Diabetic eye exam    Patient instructed to eat a healthy low fat/ diabetic diet. 5. Hyperlipidemia, unspecified hyperlipidemia type  Continue atorvastatin 20mg daily. Patient has an order for lipid panel. 6. Morbid obesity ([address])  Patient instructed to eat a healthy low fat/diabetic diet. 7. Recurrent major depressive disorder, remission status unspecified ([address])  Denies any suicidal thoughts or homicidal thoughts. Patient reports that he is managing well on his own. Patient instructed to follow-up in 2 months or sooner prn. Tobacco Cessation Counseling: Tobacco cessation counseling was provided. The patient is sincerely urged to quit consumption of tobacco. He is not ready to quit tobacco.     Depression Screening Follow-up Plan: Patient's depression screening was positive with a PHQ-2 score of . Their PHQ-9 score was 4. Patient declines further evaluation by mental health professional and/or medications. They have no active suicidal ideations. Brief counseling provided and recommend additional follow-up/re-evaluation at next office visit. Subjective      Patient is here for a follow-up for chronic medical conditions.     Patient is here for a follow-up for Type 2 DM. Patient reports that he eats a healthy diet. Patient takes eliquis for PE. Patient has a follow-up with pulmonary today. Patient has a follow-up with cardiology in August for thoracic aortic ectasia. Denies any chest pain or palpitations. Patient follows up with nephrology for HTN. Patient is here for a follow-up for hyperlipidemia and obesity. Patient's BMI is 50.06. Patient takes atorvastatin 20mg daily for hyperlipidemia. Patient is here for a follow-up for depression. Patient reports that he is managing it well on his own. Denies any suicidal thoughts or homicidal thoughts. Review of Systems   Constitutional: Negative for chills and fever. HENT: Negative for congestion, ear pain and sore throat. Respiratory: Negative for cough, chest tightness and wheezing. Cardiovascular: Negative for chest pain and palpitations. Gastrointestinal: Negative for abdominal pain, diarrhea, nausea and vomiting. Skin: Negative for rash. Neurological: Negative for dizziness, seizures, syncope, light-headedness and headaches. Psychiatric/Behavioral: Negative for suicidal ideas. As noted in HPI. Current Outpatient Medications on File Prior to Visit   Medication Sig   • amLODIPine (NORVASC) 10 mg tablet Take 1 tablet (10 mg total) by mouth daily   • apixaban (Eliquis) 5 mg Take 1 tablet (5 mg total) by mouth 2 (two) times a day   • atorvastatin (LIPITOR) 20 mg tablet Take 1 tablet (20 mg total) by mouth daily   • chlorthalidone 25 mg tablet Take 1 tablet (25 mg total) by mouth daily   • doxycycline hyclate (VIBRAMYCIN) 100 mg capsule Take one tab PO BID with food and water. Do not lie down for one hour after taking. Avoid the sun or apply SPF50+ broad spectrum sunscreen every 2 hours while outdoors.    • hydrocortisone 2.5 % cream Apply topically to face BID for no longer than 7 days for flares   • ketoconazole (NIZORAL) 2 % cream Apply topically daily   • ketoconazole (NIZORAL) 2 % shampoo Apply topically to scalp, eyebrows, and face. Leave on for 5 minutes and then rinse   • losartan (COZAAR) 100 MG tablet Take 1 tablet (100 mg total) by mouth daily   • metoprolol tartrate (LOPRESSOR) 100 mg tablet Take 1 tablet (100 mg total) by mouth 2 (two) times a day   • morphine (MS CONTIN) 15 mg 12 hr tablet take 1 tablet every 8 hours if needed pain   • oxyCODONE (ROXICODONE) 30 MG immediate release tablet take 1 tablet by mouth every 5 hours if needed for pain . Veronda Bue MAX 4/DAY   • spironolactone (ALDACTONE) 25 mg tablet Take 1 tablet (25 mg total) by mouth daily   • tadalafil (CIALIS) 5 MG tablet Take 5 mg by mouth daily as needed for erectile dysfunction (unsure dosage) Indications: Erectile Dysfunction. Objective     /76   Pulse 70   Resp 16   Ht 5' 9" (1.753 m)   Wt (!) 154 kg (339 lb)   SpO2 96%   BMI 50.06 kg/m²     Physical Exam  Vitals reviewed. Constitutional:       General: He is not in acute distress. Appearance: He is obese. He is not ill-appearing or diaphoretic. HENT:      Right Ear: External ear normal.      Left Ear: External ear normal.      Nose: Nose normal.      Mouth/Throat:      Mouth: Mucous membranes are moist.      Pharynx: Oropharynx is clear. No oropharyngeal exudate or posterior oropharyngeal erythema. Eyes:      Conjunctiva/sclera: Conjunctivae normal.      Pupils: Pupils are equal, round, and reactive to light. Cardiovascular:      Rate and Rhythm: Normal rate and regular rhythm. Pulses: Normal pulses. Heart sounds: Normal heart sounds. Pulmonary:      Effort: Pulmonary effort is normal. No respiratory distress. Breath sounds: Normal breath sounds. No wheezing. Skin:     Findings: No rash. Neurological:      Mental Status: He is alert and oriented to person, place, and time.    Psychiatric:         Mood and Affect: Mood normal.       MARJORIE Alvarado show

## 2023-08-22 ENCOUNTER — APPOINTMENT (OUTPATIENT)
Dept: ELECTROPHYSIOLOGY | Facility: CLINIC | Age: 83
End: 2023-08-22
Payer: MEDICARE

## 2023-08-22 ENCOUNTER — NON-APPOINTMENT (OUTPATIENT)
Age: 83
End: 2023-08-22

## 2023-08-22 PROCEDURE — G2066: CPT

## 2023-08-22 PROCEDURE — 93298 REM INTERROG DEV EVAL SCRMS: CPT

## 2023-09-26 ENCOUNTER — APPOINTMENT (OUTPATIENT)
Dept: ELECTROPHYSIOLOGY | Facility: CLINIC | Age: 83
End: 2023-09-26
Payer: MEDICARE

## 2023-09-26 ENCOUNTER — NON-APPOINTMENT (OUTPATIENT)
Age: 83
End: 2023-09-26

## 2023-09-26 PROCEDURE — G2066: CPT

## 2023-09-26 PROCEDURE — 93298 REM INTERROG DEV EVAL SCRMS: CPT

## 2023-10-17 ENCOUNTER — APPOINTMENT (OUTPATIENT)
Dept: UROLOGY | Facility: CLINIC | Age: 83
End: 2023-10-17
Payer: MEDICARE

## 2023-10-17 VITALS
BODY MASS INDEX: 31.18 KG/M2 | SYSTOLIC BLOOD PRESSURE: 119 MMHG | HEIGHT: 63 IN | WEIGHT: 176 LBS | DIASTOLIC BLOOD PRESSURE: 65 MMHG | HEART RATE: 71 BPM | RESPIRATION RATE: 16 BRPM

## 2023-10-17 DIAGNOSIS — D41.02 NEOPLASM OF UNCERTAIN BEHAVIOR OF LEFT KIDNEY: ICD-10-CM

## 2023-10-17 PROCEDURE — 99204 OFFICE O/P NEW MOD 45 MIN: CPT

## 2023-10-30 ENCOUNTER — APPOINTMENT (OUTPATIENT)
Dept: ELECTROPHYSIOLOGY | Facility: CLINIC | Age: 83
End: 2023-10-30
Payer: MEDICARE

## 2023-10-30 ENCOUNTER — NON-APPOINTMENT (OUTPATIENT)
Age: 83
End: 2023-10-30

## 2023-10-31 PROCEDURE — G2066: CPT | Mod: NC

## 2023-10-31 PROCEDURE — 93298 REM INTERROG DEV EVAL SCRMS: CPT | Mod: NC

## 2023-12-01 ENCOUNTER — APPOINTMENT (OUTPATIENT)
Dept: ELECTROPHYSIOLOGY | Facility: CLINIC | Age: 83
End: 2023-12-01
Payer: MEDICARE

## 2023-12-01 ENCOUNTER — NON-APPOINTMENT (OUTPATIENT)
Age: 83
End: 2023-12-01

## 2023-12-02 PROCEDURE — G2066: CPT | Mod: NC

## 2023-12-02 PROCEDURE — 93298 REM INTERROG DEV EVAL SCRMS: CPT | Mod: NC

## 2024-01-09 ENCOUNTER — APPOINTMENT (OUTPATIENT)
Dept: ELECTROPHYSIOLOGY | Facility: CLINIC | Age: 84
End: 2024-01-09
Payer: MEDICARE

## 2024-01-09 ENCOUNTER — NON-APPOINTMENT (OUTPATIENT)
Age: 84
End: 2024-01-09

## 2024-01-10 PROCEDURE — 93298 REM INTERROG DEV EVAL SCRMS: CPT

## 2024-02-08 NOTE — PHARMACOTHERAPY INTERVENTION NOTE - INTERVENTION TYPE MED REC
Per Dr. Cristiano Laboy MD [facility], patient to keep follow up in April.     No changes for now.     Patient advised today.      Med Rec - Admission

## 2024-02-12 ENCOUNTER — NON-APPOINTMENT (OUTPATIENT)
Age: 84
End: 2024-02-12

## 2024-02-12 ENCOUNTER — APPOINTMENT (OUTPATIENT)
Dept: ELECTROPHYSIOLOGY | Facility: CLINIC | Age: 84
End: 2024-02-12
Payer: MEDICARE

## 2024-02-13 PROCEDURE — 93298 REM INTERROG DEV EVAL SCRMS: CPT

## 2024-03-18 ENCOUNTER — NON-APPOINTMENT (OUTPATIENT)
Age: 84
End: 2024-03-18

## 2024-03-18 ENCOUNTER — APPOINTMENT (OUTPATIENT)
Dept: ELECTROPHYSIOLOGY | Facility: CLINIC | Age: 84
End: 2024-03-18
Payer: MEDICARE

## 2024-03-18 PROCEDURE — 93298 REM INTERROG DEV EVAL SCRMS: CPT

## 2024-04-04 ENCOUNTER — APPOINTMENT (OUTPATIENT)
Dept: UROLOGY | Facility: CLINIC | Age: 84
End: 2024-04-04

## 2024-04-19 ENCOUNTER — APPOINTMENT (OUTPATIENT)
Dept: ELECTROPHYSIOLOGY | Facility: CLINIC | Age: 84
End: 2024-04-19

## 2024-04-19 ENCOUNTER — NON-APPOINTMENT (OUTPATIENT)
Age: 84
End: 2024-04-19

## 2024-04-19 ENCOUNTER — APPOINTMENT (OUTPATIENT)
Dept: ELECTROPHYSIOLOGY | Facility: CLINIC | Age: 84
End: 2024-04-19
Payer: MEDICARE

## 2024-04-19 PROCEDURE — 93298 REM INTERROG DEV EVAL SCRMS: CPT

## 2024-05-29 ENCOUNTER — APPOINTMENT (OUTPATIENT)
Dept: ELECTROPHYSIOLOGY | Facility: CLINIC | Age: 84
End: 2024-05-29
Payer: MEDICARE

## 2024-05-29 ENCOUNTER — NON-APPOINTMENT (OUTPATIENT)
Age: 84
End: 2024-05-29

## 2024-05-29 PROCEDURE — 93298 REM INTERROG DEV EVAL SCRMS: CPT

## 2024-07-02 ENCOUNTER — APPOINTMENT (OUTPATIENT)
Dept: ELECTROPHYSIOLOGY | Facility: CLINIC | Age: 84
End: 2024-07-02
Payer: MEDICARE

## 2024-07-02 ENCOUNTER — NON-APPOINTMENT (OUTPATIENT)
Age: 84
End: 2024-07-02

## 2024-07-02 PROCEDURE — 93298 REM INTERROG DEV EVAL SCRMS: CPT

## 2024-07-19 ENCOUNTER — APPOINTMENT (OUTPATIENT)
Dept: ELECTROPHYSIOLOGY | Facility: CLINIC | Age: 84
End: 2024-07-19

## 2024-08-06 ENCOUNTER — NON-APPOINTMENT (OUTPATIENT)
Age: 84
End: 2024-08-06

## 2024-08-06 ENCOUNTER — APPOINTMENT (OUTPATIENT)
Dept: ELECTROPHYSIOLOGY | Facility: CLINIC | Age: 84
End: 2024-08-06

## 2024-08-06 PROCEDURE — 93298 REM INTERROG DEV EVAL SCRMS: CPT

## 2024-08-27 ENCOUNTER — APPOINTMENT (OUTPATIENT)
Dept: ELECTROPHYSIOLOGY | Facility: CLINIC | Age: 84
End: 2024-08-27
Payer: MEDICARE

## 2024-08-27 ENCOUNTER — NON-APPOINTMENT (OUTPATIENT)
Age: 84
End: 2024-08-27

## 2024-08-27 VITALS — DIASTOLIC BLOOD PRESSURE: 76 MMHG | SYSTOLIC BLOOD PRESSURE: 149 MMHG | HEART RATE: 85 BPM

## 2024-08-27 PROCEDURE — 93285 PRGRMG DEV EVAL SCRMS IP: CPT

## 2024-08-27 RX ORDER — METOPROLOL TARTRATE 50 MG/1
50 TABLET, FILM COATED ORAL TWICE DAILY
Refills: 0 | Status: ACTIVE | COMMUNITY

## 2024-08-27 RX ORDER — SUCROFERRIC OXYHYDROXIDE 500 MG/1
500 TABLET, CHEWABLE ORAL
Refills: 0 | Status: ACTIVE | COMMUNITY

## 2024-08-27 RX ORDER — NIFEDIPINE 90 MG/1
90 TABLET, EXTENDED RELEASE ORAL
Refills: 0 | Status: ACTIVE | COMMUNITY

## 2024-09-10 NOTE — ED PROVIDER NOTE - DISPOSITION TYPE
Patient ID: Magdalena Dickey is a 69 y.o. female is here today for follow-up to discuss having surgery for cervical pain.    Subjective     The patient is here in regards to   Chief Complaint   Patient presents with    Neck Pain    Follow-up       History of Present Illness  Magdalena is here for her preoperative appointment.  She continues to have neck pain, cervical radiculopathy and headaches arising from her cervicalgia.      While in the room and during my examination of the patient I wore a mask and eye protection.  I washed my hands before and after this patient encounter.  The patient was also wearing a mask.    The following portions of the patient's history were reviewed and updated as appropriate: allergies, current medications, past family history, past medical history, past social history, past surgical history and problem list.    Review of Systems   Constitutional:  Negative for fever.   Eyes:  Negative for visual disturbance.   Gastrointestinal:  Positive for nausea. Negative for vomiting.   Musculoskeletal:  Positive for neck pain and neck stiffness.   Neurological:  Positive for dizziness, light-headedness and headaches. Negative for weakness and numbness.   Psychiatric/Behavioral:  Positive for decreased concentration. Negative for confusion.         Past Medical History:   Diagnosis Date    Abnormal mammogram 03/2022    Anesthesia     WAKES UP AGITATED    Anxiety     Cervical disc disorder     Cervical radiculopathy     Cervical spondylolysis     Chest pain 08/19/2022    ADMITTED TO [facility]    Chronic bilateral low back pain without sciatica     Chronic pain disorder     Colon polyps     FOLLOWED BY DR. ADRIA NYE    COPD (chronic obstructive pulmonary disease)     COVID-19 08/15/2020    SEEN AT     Delayed emergence from anesthesia 2018    Depression 05/25/2016    Diverticulitis 01/16/2018    ADMITTED TO [facility]    Diverticulitis 11/24/2017    SEEN AT [facility] ER    Diverticulitis of colon 06/2020     Dysesthesia     Dyspepsia     Extremity pain     Fecal incontinence 09/2022    GERD (gastroesophageal reflux disease)     Hair loss 12/2020    Headache, tension-type     Hiatal hernia     History of measles, mumps, or rubella     MEASLES AND MUMPS AS A CHILD    Hypertension     IBS (irritable bowel syndrome)     ILD (interstitial lung disease)     Insomnia     Joint pain     Lumbosacral disc disease     Lung nodule 11/2020    Migraine without aura and without status migrainosus, not intractable 05/25/2016    NAFLD (nonalcoholic fatty liver disease)     Neck pain     Osteoarthritis     Palpitations 04/09/2019    SEEN AT [facility] ER    Paralysis of right vocal cord     Paresthesias     PNA (pneumonia) 08/23/2020    D/T COVID, ADMITTED TO [facility]    Polyneuropathy     Post-COVID chronic dyspnea     PUD (peptic ulcer disease)     Rotator cuff tendinitis, right 05/08/2018    Spinal stenosis     Thrush 09/2020    Vestibular neuritis 09/2014       No Known Allergies    Family History   Problem Relation Age of Onset    Alzheimer's disease Mother         SDAT    Hypertension Mother     Diabetes type II Mother     Lung cancer Mother         POSSIBLE    Heart attack Mother     Cancer Mother         lung    COPD Mother     Depression Mother     Diabetes Mother     Heart disease Mother     Miscarriages / Stillbirths Mother     Alcohol abuse Father     Prostate cancer Father     Heart disease Father         THIRD DEGREE HEART BLOCK    Arthritis Father     Cancer Father         prostate    Drug abuse Father         Only alcohol    Learning disabilities Father     Cancer Sister     Ovarian cancer Sister     Cancer Sister     Ovarian cancer Sister     Arthritis Sister     Cancer Sister         ovarian    Depression Sister     Hypertension Sister     Cancer Sister         ovarian    Depression Sister     Hypertension Sister     COPD Sister     No Known Problems Brother     Arthritis Maternal Grandmother         rheumatoid    Breast cancer  Other     Malig Hyperthermia Neg Hx        Social History     Socioeconomic History    Marital status:      Spouse name: Lonnie    Number of children: 3   Tobacco Use    Smoking status: Former     Current packs/day: 0.00     Average packs/day: 1 pack/day for 40.0 years (40.0 ttl pk-yrs)     Types: Cigarettes     Start date: 3/10/1974     Quit date: 3/10/2014     Years since quitting: 10.5     Passive exposure: Past    Smokeless tobacco: Never   Vaping Use    Vaping status: Former   Substance and Sexual Activity    Alcohol use: Yes     Comment: occasional, maybe 1 can of beer every other month    Drug use: No    Sexual activity: Not Currently     Partners: Male     Birth control/protection: Hysterectomy     Comment: .       Past Surgical History:   Procedure Laterality Date    ANTERIOR CERVICAL DISCECTOMY W/ FUSION N/A 06/21/2018    Procedure: C6 7 anterior cervical discectomy and fusion with allograft and plate;  Surgeon: Jacobo De Dios MD;  Location: [facility];  Service: Neurosurgery    CERVICAL EPIDURAL N/A 6/5/2024    Procedure: C7-T1 CERVICAL EPIDURAL STEROID INJECTION CPT: 01726;  Surgeon: Sarah Bernal MD;  Location: [facility] OR;  Service: Pain Management;  Laterality: N/A;    CHOLECYSTECTOMY N/A 1980    AT [place]    COLON RESECTION N/A 01/16/2018    Procedure: LAPAROSCOPIC SIGMOID COLON RESECTION WITH MOBILIZATION OF SPLENIC FLEXURE;  Surgeon: Eric Davidson MD;  Location: [facility];  Service:     COLONOSCOPY N/A 02/15/2017    6 MM TUBULAR ADENOMA POLYP IN HEPATIC FLEXURE, 5 MM TUBULAR ADENOAM POLYP IN RECTUM, DR. GERARDO NYE AT [facility]    COLONOSCOPY N/A 03/01/2003    Internal hemorrhoids-Dr. Gerardo Nye    COLONOSCOPY N/A 02/08/2022    4 MM TUBULAR ADENOMA POLYP IN ASCENDING, HEMORRHOIDS, DR. GERARDO NYE AT [facility]    DIAGNOSTIC LAPAROSCOPY N/A 06/06/2001    Cul-de-sac endometriosis and adhesions-Dr. Aamir Christine    ENDOSCOPY N/A 02/15/2017    SMALL HIATAL HERNIA, GASTRITIS,   ADRIA NYE AT [facility]    ENDOSCOPY N/A 02/08/2022    GASTRITIS, MILD ESOPHAGITIS, Z LINE IRREGULAR, DR. ADRIA NYE AT [facility]    ENDOSCOPY N/A 09/12/2014    GASTRITIS, DR. ADRIA NYE AT [facility]    ENDOSCOPY AND COLONOSCOPY N/A 02/04/2009    SMALL HIATAL HERNIA, ESOPHAGITIS, HEMORRHOIDS, DIVERTICULOSIS, TORTUOUS COLON, DR. ADRIA NYE AT [facility]    EPIDURAL Bilateral 12/06/2023    Procedure: BILATERAL L4 TRANSFORAMINAL LUMBAR EPIDURAL STEROID INJECTION CPT: 46431, 19440;  Surgeon: Sarah Bernal MD;  [facility];  Service: Pain Management;  Laterality: Bilateral;    EPIDURAL BLOCK      LUMBAR EPIDURAL INJECTION N/A 09/20/2023    Procedure: LUMBAR EPIDURAL 1ST VISIT L4-5 70315;  Surgeon: Sarah Bernal MD;  Location: [facility];  Service: Pain Management;  Laterality: N/A;    MEDIAL BRANCH BLOCK Bilateral 02/26/2024    Procedure: BILATERAL L3-L5 LUMBAR MEDIAL BRANCH BLOCK CPT: 04331, 80241;  Surgeon: Sarah Bernal MD;  Location: [facility];  Service: Pain Management;  Laterality: Bilateral;    MEDIAL BRANCH BLOCK Bilateral 3/11/2024    Procedure: BILATERAL L3-L5 LUMBAR MEDIAL BRANCH BLOCK CPT: 17818, 992634;  Surgeon: Sarah Bernal MD;  Location[facility];  Service: Pain Management;  Laterality: Bilateral;    NECK SURGERY      RADIOFREQUENCY ABLATION Bilateral 4/15/2024    Procedure: BILATERAL L3-L5 RADIOFREQUENCY ABLATION LUMBAR CPT: 41256, 10050;  Surgeon: Sarah Bernal MD;  Location: [facility];  Service: Pain Management;  Laterality: Bilateral;    SKIN BIOPSY Left 07/08/2022    LEFT CALF, PATH: SMALL FIBER NEUROPATHY    SPINAL FUSION      TOTAL ABDOMINAL HYSTERECTOMY WITH SALPINGO OOPHORECTOMY Bilateral 07/31/2001    Dr. Aamir Christine AT [facility]         Objective     Vitals:    09/11/24 1120   BP: 142/72   Pulse: 91   Resp: 16   Temp: 97.1 °F (36.2 °C)   SpO2: 95%     Body mass index is 30.04 kg/m².    Physical Exam  Constitutional:       Appearance: Normal appearance.   HENT:      Head:  Normocephalic and atraumatic.   Eyes:      Extraocular Movements: Extraocular movements intact.      Conjunctiva/sclera: Conjunctivae normal.      Pupils: Pupils are equal, round, and reactive to light.   Cardiovascular:      Rate and Rhythm: Normal rate and regular rhythm.      Pulses: Normal pulses.   Pulmonary:      Breath sounds: Normal breath sounds.   Abdominal:      Palpations: Abdomen is soft.   Musculoskeletal:         General: Normal range of motion.      Cervical back: Normal range of motion and neck supple.   Skin:     General: Skin is warm and dry.   Neurological:      Mental Status: She is alert and oriented to person, place, and time.      Cranial Nerves: Cranial nerves 2-12 are intact.      Motor: Motor function is intact. No weakness or atrophy.      Coordination: Coordination is intact. Romberg sign negative. Romberg Test normal.      Gait: Gait is intact. Gait normal.      Deep Tendon Reflexes: Reflexes are normal and symmetric.      Reflex Scores:       Tricep reflexes are 2+ on the right side and 2+ on the left side.       Bicep reflexes are 2+ on the right side and 2+ on the left side.       Brachioradialis reflexes are 2+ on the right side and 2+ on the left side.       Patellar reflexes are 2+ on the right side and 2+ on the left side.       Achilles reflexes are 2+ on the right side and 2+ on the left side.  Psychiatric:         Speech: Speech normal.         Neurologic Exam     Mental Status   Oriented to person, place, and time.   Attention: normal. Concentration: normal.   Speech: speech is normal   Level of consciousness: alert    Cranial Nerves   Cranial nerves II through XII intact.     CN III, IV, VI   Pupils are equal, round, and reactive to light.    Motor Exam   Muscle bulk: normal  Overall muscle tone: normal    Strength   Strength 5/5 except as noted.     Sensory Exam   Light touch normal.     Gait, Coordination, and Reflexes     Gait  Gait: normal    Coordination   Romberg:  negative    Reflexes   Reflexes 2+ except as noted.   Right brachioradialis: 2+  Left brachioradialis: 2+  Right biceps: 2+  Left biceps: 2+  Right triceps: 2+  Left triceps: 2+  Right patellar: 2+  Left patellar: 2+  Right achilles: 2+  Left achilles: 2+      Assessment & Plan   Independent Review of Radiographic Studies:      I personally reviewed the images from the following studies.    No new neuroimaging.    Assessment/Plan: Plans for C5-6 ACDF with removal of previous hardware.  We discussed the risk and benefits and alternatives of surgery including CSF leak, injury to nerve roots, need for further surgery down the line.  She understood and was willing to proceed with surgery    Medical Decision Making:      C5-6 ACDF with removal of previous hardware         Diagnoses and all orders for this visit:    1. 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 Abnormal coagulation profile  -     aPTT; Future  -     Protime-INR; Future    Other orders  -     Inpatient Admission; Standing  -     Follow Anesthesia Guidelines / Protocol; Future  -     Follow Anesthesia Guidelines / Protocol; Standing  -     Verify / Perform Chlorhexidine Skin Prep; Standing  -     Provide Patient With Instructions on NPO Status; Future  -     Provide Chlorhexidine Skin Prep Wipes and Instructions; Future             Patient Instructions/Recommendations:    Call with any questions or concerns      Finn Ojeda MD  09/12/24  14:05 EDT       ADMIT

## 2024-09-23 NOTE — ASU PATIENT PROFILE, ADULT - MEDICATIONS BROUGHT TO HOSPITAL, PROFILE
Patient states she cancelled 9/25/24   EXPLORATION, INGUINAL REGION Left General   BIOPSY, LEFT INGUINAL LYMPH NODE         Requesting a referral to see Dr. Hernadnez for second opinion, please advise.   no

## 2024-10-15 ENCOUNTER — APPOINTMENT (OUTPATIENT)
Dept: ELECTROPHYSIOLOGY | Facility: CLINIC | Age: 84
End: 2024-10-15
Payer: MEDICARE

## 2024-10-15 ENCOUNTER — NON-APPOINTMENT (OUTPATIENT)
Age: 84
End: 2024-10-15

## 2024-10-15 PROCEDURE — 93298 REM INTERROG DEV EVAL SCRMS: CPT

## 2024-11-18 ENCOUNTER — EMERGENCY (EMERGENCY)
Facility: HOSPITAL | Age: 84
LOS: 1 days | Discharge: HOME | End: 2024-11-18
Attending: STUDENT IN AN ORGANIZED HEALTH CARE EDUCATION/TRAINING PROGRAM
Payer: MEDICARE

## 2024-11-18 VITALS
OXYGEN SATURATION: 98 % | SYSTOLIC BLOOD PRESSURE: 145 MMHG | WEIGHT: 175.05 LBS | HEART RATE: 89 BPM | TEMPERATURE: 98 F | DIASTOLIC BLOOD PRESSURE: 81 MMHG | RESPIRATION RATE: 16 BRPM | HEIGHT: 63 IN

## 2024-11-18 DIAGNOSIS — I77.0 ARTERIOVENOUS FISTULA, ACQUIRED: Chronic | ICD-10-CM

## 2024-11-18 DIAGNOSIS — Z98.890 OTHER SPECIFIED POSTPROCEDURAL STATES: Chronic | ICD-10-CM

## 2024-11-18 DIAGNOSIS — Z90.710 ACQUIRED ABSENCE OF BOTH CERVIX AND UTERUS: Chronic | ICD-10-CM

## 2024-11-18 PROCEDURE — 99284 EMERGENCY DEPT VISIT MOD MDM: CPT

## 2024-11-18 PROCEDURE — 93010 ELECTROCARDIOGRAM REPORT: CPT

## 2024-11-18 NOTE — ED ADULT TRIAGE NOTE - CHIEF COMPLAINT QUOTE
Pt arrives to ED s/p fall with head strike to floor to left side of head, no LOC.   hx: dialysis MWF completed her session today, typically reports leg weakness after her sessions. Pt takes ASA but has not taken it today.   Ambulates with walker, was able to ambulate in front of EMS denies dizziness or weakness. hx: heart valve replacement, HTN, HLD, DM fs = 117   Left arm fistula

## 2024-11-19 ENCOUNTER — APPOINTMENT (OUTPATIENT)
Dept: ELECTROPHYSIOLOGY | Facility: CLINIC | Age: 84
End: 2024-11-19
Payer: MEDICARE

## 2024-11-19 ENCOUNTER — NON-APPOINTMENT (OUTPATIENT)
Age: 84
End: 2024-11-19

## 2024-11-19 VITALS
TEMPERATURE: 98 F | RESPIRATION RATE: 18 BRPM | HEART RATE: 98 BPM | DIASTOLIC BLOOD PRESSURE: 64 MMHG | SYSTOLIC BLOOD PRESSURE: 99 MMHG | OXYGEN SATURATION: 96 %

## 2024-11-19 LAB — SARS-COV-2 RNA SPEC QL NAA+PROBE: SIGNIFICANT CHANGE UP

## 2024-11-19 PROCEDURE — 70450 CT HEAD/BRAIN W/O DYE: CPT | Mod: 26,MC

## 2024-11-19 PROCEDURE — 72170 X-RAY EXAM OF PELVIS: CPT | Mod: 26

## 2024-11-19 PROCEDURE — 93298 REM INTERROG DEV EVAL SCRMS: CPT

## 2024-11-19 PROCEDURE — 71046 X-RAY EXAM CHEST 2 VIEWS: CPT | Mod: 26

## 2024-11-19 NOTE — ED PROVIDER NOTE - NSFOLLOWUPINSTRUCTIONS_ED_ALL_ED_FT
Head Injury    WHAT YOU NEED TO KNOW:    A head injury can include your scalp, face, skull, or brain and range from mild to severe. Effects can appear immediately after the injury or develop later. The effects may last a short time or be permanent. Healthcare providers may want to check your recovery over time. Treatment may change as you recover or develop new health problems from the head injury.    DISCHARGE INSTRUCTIONS:    Call your local emergency number (911 in the [place]) or have someone call if:  You cannot be woken.  You have a seizure.  You stop responding to others or you faint.  You have blurry or double vision.  Your speech becomes slurred or confused.  You have arm or leg weakness, loss of feeling, or new problems with coordination.  Your pupils are larger than usual, or one pupil is a different size than the other.  You have blood or clear fluid coming out of your ears or nose.    Seek care immediately if:  You have repeated or forceful vomiting.  You feel confused.  Your headache gets worse or becomes severe.  You or someone caring for you notices that you are harder to wake than usual.    Call your doctor if:  Your symptoms last longer than 6 weeks after the injury.  You have questions or concerns about your condition or care.    Medicines:  Acetaminophen decreases pain and fever. It is available without a doctor's order. Ask how much to take and how often to take it. Follow directions. Read the labels of all other medicines you are using to see if they also contain acetaminophen, or ask your doctor or pharmacist. Acetaminophen can cause liver damage if not taken correctly.    Take your medicine as directed. Contact your healthcare provider if you think your medicine is not helping or if you have side effects. Tell your provider if you are allergic to any medicine. Keep a list of the medicines, vitamins, and herbs you take. Include the amounts, and when and why you take them. Bring the list or the pill bottles to follow-up visits. Carry your medicine list with you in case of an emergency.  Self-care:    Rest or do quiet activities. Limit your time watching TV, using the computer, or doing tasks that require a lot of thinking. Slowly return to your normal activities as directed. Do not play sports or do activities that may cause you to get hit in the head. Ask your healthcare provider when you can return to sports.    Apply ice on your head for 15 to 20 minutes every hour or as directed. Use an ice pack, or put crushed ice in a plastic bag. Cover it with a towel before you apply it. Ice helps decrease swelling and pain.    Have someone stay with you for 24 hours , or as directed. This person can monitor you for problems and call for help if needed. When you are awake, the person should ask you a few questions every few hours to see if you are thinking clearly. An example is to ask your name or address.

## 2024-11-19 NOTE — ED ADULT NURSE REASSESSMENT NOTE - NS ED NURSE REASSESS COMMENT FT1
PT stable. Respirations even and unlabored. To be discharged. Awaiting a ride. Safety precautions in place.
Break RN: Pt is A&Ox4, resting in stretcher with no complaints at this time. Respirations even and unlabored, chest rise equal b/l. Pt denies chest pain, SOB, abdominal pain, N/V/D, h/a, dizziness, numbness/tingling or any urinary symptoms at this time. No acute distress noted. Safety maintained throughout. Pt pending CT results.

## 2024-11-19 NOTE — ED ADULT NURSE NOTE - NSFALLRISKINTERV_ED_ALL_ED

## 2024-11-19 NOTE — ED PROVIDER NOTE - PATIENT PORTAL LINK FT
You can access the FollowMyHealth Patient Portal offered by [facility] by registering at the following website: [email or web address]. By joining [facility] FollowMyHealth portal, you will also be able to view your health information using other applications (apps) compatible with our system.

## 2024-11-19 NOTE — ED PROVIDER NOTE - CARE PLAN
Principal Discharge DX:	Closed head injury  Secondary Diagnosis:	Fall  Secondary Diagnosis:	Hematoma   1

## 2024-11-19 NOTE — ED PROVIDER NOTE - PROGRESS NOTE DETAILS
THERESE Carrero: Patient and patient's daughter counseled regarding CT and x-ray findings.  Patient is feeling well and has no new complaints.  Counseled regarding signs and symptoms warranting return, all questions were answered.

## 2024-11-19 NOTE — ED PROVIDER NOTE - CLINICAL SUMMARY MEDICAL DECISION MAKING FREE TEXT BOX
84-year-old female with a history of HTN, Aortic stenosis, DM, ESRD on dialysis MWF who presents for evaluation following a fall.  Patient states that she generally feels weak in her legs after having dialysis and this was the same today.  She notes that she was taking down her pants to change her close when she attempted to kick off her pants without using her walker or holding onto the bed subsequently causing her to fall and strike the left side of her head.  Patient is unsure of loss of consciousness.  Patient denies any complaints, no back pain, headache, neck pain, dizziness, lightheadedness, nausea since falling, blurred vision.  No other voiced complaints.  VSS. Exam as noted. Will obtain CT of head to rule out intracranial bleed versus skull fracture and also screening x-rays of chest and pelvis although patient without complaints.  Follow progress also dispo.

## 2024-11-19 NOTE — ED PROVIDER NOTE - NSICDXPASTMEDICALHX_GEN_ALL_CORE_FT
PAST MEDICAL HISTORY:  Aortic stenosis wishes to discuss TAVR, has Cardiology clearance    Bilateral cataracts     Cough hx of pt on inhaler under Pulmonology  care , last time used November 2020    Diabetic retinopathy     DM (diabetes mellitus) on insulin    ESRD (end stage renal disease) on dialysis     Fibroids hx of    Gout     H/O syncope 09/19/2020- pt hospitalized , cardiac workup done , HD started loop recorder done    HLD (hyperlipidemia)     Hypertension     Refusal of blood transfusions as patient is Tenri[place]

## 2024-11-19 NOTE — ED PROVIDER NOTE - PHYSICAL EXAMINATION
NEURO:  Awake, alert and cooperative. Gait steady with assistance. No focal weakness or paralysis.  HEAD:  (+) superficial abrasion and hematoma to left temple and eyebrow, tender to palpation. Negative Baltazar's sign.  PSYCH: Mood appropriate to subject. Thought processes intact.   EYES:  PERRL; EOM intact; no entrapment.  ENMT:  Lips, mouth, and pharynx appear without obvious trauma.   NECK:  Non-tender to bony palpation. FROM.   CARD: Regular rate and rhythm.  CHEST/RESP:  No accessory muscle use; breath sounds clear and equal bilaterally. No pain with chest palpation.  ABD:  Soft; non-distended; non-tender. No guarding or rebound.   MUSCULOSKELETAL/EXTREMITIES: FROM in all four extremities; non-tender to palpation; radial pulses are 2+; no extremity edema. Pelvis in non-tender b/l, FROM.   BACK: No bony point-tenderness. ROM intact.   SKIN:  As noted above otherwise skin is well hydrated.

## 2024-11-19 NOTE — ED PROVIDER NOTE - ATTENDING APP SHARED VISIT CONTRIBUTION OF CARE
84-year-old female with a history of HTN, Aortic stenosis, DM, ESRD on dialysis MWF who presents for evaluation following a mechanical fall w/ head injury. Pt on daily ASA. Denies LOC. Denies CP, HA, vision changes, or neuro sxs. VSS. Physical exam significant for ecchymosis/abrasion to Lt temple w/ superficial hematoma. Neuro exam is unremarkable. Plan to obtain CTH and CXR/pelvis XR to eval for fractures/brain bleed. Dispo pending results and reassessment.

## 2024-11-19 NOTE — ED ADULT NURSE NOTE - OBJECTIVE STATEMENT
A&Ox3. Past medical history of HTN, DM, dialysis MWF and HLD.  Presents to ED s/p fall with head strike to floor to left side of head, no LOC. Completed her dialysis session today, typically reports leg weakness after her sessions. Pt takes ASA but has not taken it today.   Ambulates with walker, was able to ambulate in front of EMS. Denies dizziness, SOB, CP or weakness. Respirations even and unlabored. Labs obtained, Awaiting diagnostic testing. Safety precautions in place.

## 2024-12-20 DIAGNOSIS — I48.0 PAROXYSMAL ATRIAL FIBRILLATION: ICD-10-CM

## 2024-12-20 RX ORDER — APIXABAN 2.5 MG/1
2.5 TABLET, FILM COATED ORAL
Qty: 60 | Refills: 5 | Status: ACTIVE | COMMUNITY
Start: 2024-12-20 | End: 1900-01-01

## 2024-12-24 ENCOUNTER — APPOINTMENT (OUTPATIENT)
Dept: ELECTROPHYSIOLOGY | Facility: CLINIC | Age: 84
End: 2024-12-24
Payer: MEDICARE

## 2024-12-24 ENCOUNTER — NON-APPOINTMENT (OUTPATIENT)
Age: 84
End: 2024-12-24

## 2024-12-24 PROCEDURE — 93298 REM INTERROG DEV EVAL SCRMS: CPT

## 2025-01-28 ENCOUNTER — NON-APPOINTMENT (OUTPATIENT)
Age: 85
End: 2025-01-28

## 2025-01-28 ENCOUNTER — APPOINTMENT (OUTPATIENT)
Dept: ELECTROPHYSIOLOGY | Facility: CLINIC | Age: 85
End: 2025-01-28

## 2025-01-28 PROCEDURE — 93298 REM INTERROG DEV EVAL SCRMS: CPT

## 2025-03-04 ENCOUNTER — NON-APPOINTMENT (OUTPATIENT)
Age: 85
End: 2025-03-04

## 2025-03-04 ENCOUNTER — APPOINTMENT (OUTPATIENT)
Dept: ELECTROPHYSIOLOGY | Facility: CLINIC | Age: 85
End: 2025-03-04
Payer: MEDICARE

## 2025-03-04 PROCEDURE — 93298 REM INTERROG DEV EVAL SCRMS: CPT

## 2025-04-08 ENCOUNTER — APPOINTMENT (OUTPATIENT)
Dept: ELECTROPHYSIOLOGY | Facility: CLINIC | Age: 85
End: 2025-04-08
Payer: MEDICARE

## 2025-04-08 ENCOUNTER — NON-APPOINTMENT (OUTPATIENT)
Age: 85
End: 2025-04-08

## 2025-04-08 PROCEDURE — 93298 REM INTERROG DEV EVAL SCRMS: CPT

## 2025-04-28 NOTE — H&P ADULT - PROBLEM SELECTOR PLAN 7
Received call from patient's mother stating the patient needs a refill for Mometasone and Tacrolimus.    Please send script to [facility] - [place] - [address] [[id number]]    DVT ppx: HSQ  Dispo: pending syncope workup, PT eval

## 2025-05-06 NOTE — ASU PREOP CHECKLIST - HEIGHT IN INCHES
Subjective   Patient ID: Rosanna Mckeon is a 50 y.o. female.    HPI  Diagnoses of Major depressive disorder, remission status unspecified, unspecified whether recurrent and Insomnia, unspecified type were pertinent to this visit.  +major stressors: loss with grief reaction, legal, physical health problems;  Coping somewhat (some family supports) and amenable to IOP for additional support;  Review of Systems   Psychiatric/Behavioral:  Positive for dysphoric mood. Negative for hallucinations, self-injury and suicidal ideas. The patient is nervous/anxious.        Objective   Physical Exam  Psychiatric:         Attention and Perception: She does not perceive auditory hallucinations.         Mood and Affect: Mood is depressed.         Behavior: Behavior normal.         Thought Content: Thought content does not include homicidal or suicidal ideation.       Vitals:  BP: 179/100 Abnormal   as of 3/6/2025 -- 179/100 Abnormal    Heart Rate: 83  as of 3/6/2025       GLUCOSE, SERUM  70 - 99 mg/dL 92   BUN  6 - 24 mg/dL 5 Low    CREATININE, SERUM  0.57 - 1.00 mg/dL 0.79   eGFR  >59 mL/min/1.73 91   BUN/CREATININE RATIO  9 - 23 6 Low    SODIUM, SERUM  134 - 144 mmol/L 141   POTASSIUM, SERUM  3.5 - 5.2 mmol/L 3.9   CHLORIDE, SERUM  96 - 106 mmol/L 102   CARBON DIOXIDE, TOTAL  20 - 29 mmol/L 23   CALCIUM, SERUM  8.7 - 10.2 mg/dL 9.9   PROTEIN, TOTAL, SERUM  6.0 - 8.5 g/dL 6.9   ALBUMIN, SERUM  3.9 - 4.9 g/dL 4.3   GLOBULIN, TOTAL  1.5 - 4.5 g/dL 2.6   BILIRUBIN, TOTAL  0.0 - 1.2 mg/dL 0.4   ALKALINE PHOSPHATASE, SERUM  44 - 121 IU/L 117   AST (SGOT)  0 - 40 IU/L 21   ALT (SGPT)  0 - 32 IU/L 17   [facility]       Specimen Collected: 03/07/25 12:16     WHITE BLOOD CELL(WBC) COUNT  3.4 - 10.8 x10E3/uL 5.1   RED BLOOD CELL (RBC) COUNT  3.77 - 5.28 x10E6/uL 4.87   HEMOGLOBIN  11.1 - 15.9 g/dL 12.6   HEMATOCRIT  34.0 - 46.6 % 39.8   MCV  79 - 97 fL 82   MCH  26.6 - 33.0 pg 25.9 Low    MCHC  31.5 - 35.7 g/dL 31.7    RDW  11.7 - 15.4 % 17.5 High    PLATELETS  150 - 450 x10E3/uL 360   NEUTROPHILS  Not Estab. % 55   LYMPHS  Not Estab. % 31   MONOCYTES  Not Estab. % 9   EOS  Not Estab. % 4   BASOPHILS  Not Estab. % 1   NEUTROPHILS (ABSOLUTE)  1.4 - 7.0 x10E3/uL 2.8   LYMPHS (ABSOLUTE)  0.7 - 3.1 x10E3/uL 1.5   MONOCYTES(ABSOLUTE)  0.1 - 0.9 x10E3/uL 0.4   EOS (ABSOLUTE)  0.0 - 0.4 x10E3/uL 0.2   BASO (ABSOLUTE)  0.0 - 0.2 x10E3/uL 0   IMMATURE GRANULOCYTES  Not Estab. % 0   IMMATURE GRANS (ABS)  0.0 - 0.1 x10E3/uL 0   [facility]     Specimen Collected: 04/30/24 12:59     Assessment/Plan   Diagnoses and all orders for this visit:  Major depressive disorder, remission status unspecified, unspecified whether recurrent  -     Follow Up In Psychiatry  -     Follow Up In Psychiatry; Future  -     Referral to Psychology; Future  -     sertraline (Zoloft) 100 mg tablet; Take 1 tablet (100 mg) by mouth once daily.  -     doxepin (Silenor) 6 mg tablet; Take 1 tablet (6 mg) by mouth as needed at bedtime (insomnia).  Insomnia, unspecified type  -     zolpidem (Ambien) 10 mg tablet; Take 1 tablet (10 mg) by mouth as needed at bedtime for sleep.  -     doxepin (Silenor) 6 mg tablet; Take 1 tablet (6 mg) by mouth as needed at bedtime (insomnia).         3

## 2025-05-13 ENCOUNTER — NON-APPOINTMENT (OUTPATIENT)
Age: 85
End: 2025-05-13

## 2025-05-13 ENCOUNTER — APPOINTMENT (OUTPATIENT)
Dept: ELECTROPHYSIOLOGY | Facility: CLINIC | Age: 85
End: 2025-05-13
Payer: MEDICARE

## 2025-05-13 PROCEDURE — 93298 REM INTERROG DEV EVAL SCRMS: CPT

## 2025-05-15 NOTE — DISCHARGE NOTE PROVIDER - NPI NUMBER (FOR SYSADMIN USE ONLY) :
No worrisome findings on your labs, chest x-ray, or EKG.  Flexeril 10 mg 3 times a day as needed for muscle spasm.    Make an appointment for recheck with your clinic provider.    Return to the emergency department for any worsening symptoms.    
[[phone]]

## 2025-05-20 NOTE — PHYSICAL THERAPY INITIAL EVALUATION ADULT - GENERAL OBSERVATIONS, REHAB EVAL
"Subjective   Patient ID: Pasquale Yeh is a 57 y.o. male who presents for Follow-up.  Pt has chronic lower back pain. It does not radiate to lower extremity.  There  not a preceding injury.   Pt is taking Tramadol with adequate relief.   Previous imaging studies show herniated disc on MRI  Pt denies fever, pain waking from sleep, saddle anesthesia or urinary or bowel incontinence.     Pt is taking Tramadol as prescribed.   Pt is not taking both opioid and benzodiazepine.   [place] report checked today reviewed and is appropriate.  UDS was checked in Feb and is appropriate.   Controlled substance contracted was printed, signed and provided to the patient in Feb.  It is my opinion that this patient is benefiting from the prescribed controlled medication.     For R elbow swelling it has not improved over the past 1 month, trying compression and icing. Also completed medrol karlee.        Review of Systems   Constitutional:  Negative for appetite change, chills, fatigue and fever.   HENT:  Negative for congestion, hearing loss and sore throat.    Eyes:  Negative for pain, redness and visual disturbance.   Respiratory:  Negative for cough, chest tightness and shortness of breath.    Cardiovascular:  Negative for chest pain and leg swelling.   Gastrointestinal:  Negative for abdominal distention, abdominal pain, blood in stool, constipation and diarrhea.   Genitourinary:  Negative for difficulty urinating and dysuria.   Musculoskeletal:  Positive for arthralgias and back pain.   Skin:  Negative for rash.   Neurological:  Negative for dizziness, weakness and headaches.   Hematological:  Negative for adenopathy. Does not bruise/bleed easily.   Psychiatric/Behavioral:  Negative for dysphoric mood. The patient is not nervous/anxious.        Objective   /84   Pulse 76   Resp 12   Ht 1.778 m (5' 10\")   Wt 95.3 kg (210 lb)   SpO2 97%   BMI 30.13 kg/m²    Physical Exam  Constitutional:       General: He is not in acute " distress.     Appearance: Normal appearance.   Cardiovascular:      Rate and Rhythm: Normal rate and regular rhythm.      Heart sounds: Normal heart sounds. No murmur heard.  Pulmonary:      Effort: Pulmonary effort is normal.      Breath sounds: Normal breath sounds.   Abdominal:      Palpations: Abdomen is soft.      Tenderness: There is no abdominal tenderness.   Musculoskeletal:      Comments: Moderately large mildly tender fluid collection over R elbow/olecranon bursa   Neurological:      Mental Status: He is alert.   Psychiatric:         Mood and Affect: Mood normal.         Judgment: Judgment normal.           Assessment/Plan   Diagnoses and all orders for this visit:  Chronic midline low back pain without sciatica - stable, continue with lowest effective dose on Tramadol  Olecranon bursitis, right elbow - not improved with conservative measures and steroids. Referring to orthopedics    Follow up in 3 months, 15mins        patient received sitting in bedside chair in NAD. patient denies CP, HA, SOB, dizziness

## 2025-06-06 NOTE — CONSULT NOTE ADULT - CONSULT REQUESTED BY NAME
Have you been to the ER, urgent care clinic since your last visit?  Hospitalized since your last visit?   NO    Have you seen or consulted any other health care providers outside our system since your last visit?   NO            
Dr. Villarreal
medicine
Dr Omar Villarreal

## 2025-06-17 ENCOUNTER — APPOINTMENT (OUTPATIENT)
Dept: ELECTROPHYSIOLOGY | Facility: CLINIC | Age: 85
End: 2025-06-17

## 2025-08-29 ENCOUNTER — INPATIENT (INPATIENT)
Facility: HOSPITAL | Age: 85
LOS: 5 days | Discharge: HOME | End: 2025-09-04
Attending: STUDENT IN AN ORGANIZED HEALTH CARE EDUCATION/TRAINING PROGRAM | Admitting: STUDENT IN AN ORGANIZED HEALTH CARE EDUCATION/TRAINING PROGRAM
Payer: MEDICARE

## 2025-08-29 VITALS
HEIGHT: 63 IN | SYSTOLIC BLOOD PRESSURE: 119 MMHG | RESPIRATION RATE: 96 BRPM | WEIGHT: 166.01 LBS | TEMPERATURE: 98 F | DIASTOLIC BLOOD PRESSURE: 67 MMHG | HEART RATE: 93 BPM | OXYGEN SATURATION: 96 %

## 2025-08-29 DIAGNOSIS — R55 SYNCOPE AND COLLAPSE: ICD-10-CM

## 2025-08-29 DIAGNOSIS — N18.6 END STAGE RENAL DISEASE: ICD-10-CM

## 2025-08-29 DIAGNOSIS — Z98.890 OTHER SPECIFIED POSTPROCEDURAL STATES: Chronic | ICD-10-CM

## 2025-08-29 DIAGNOSIS — E78.5 HYPERLIPIDEMIA, UNSPECIFIED: ICD-10-CM

## 2025-08-29 DIAGNOSIS — I77.0 ARTERIOVENOUS FISTULA, ACQUIRED: Chronic | ICD-10-CM

## 2025-08-29 DIAGNOSIS — J81.0 ACUTE PULMONARY EDEMA: ICD-10-CM

## 2025-08-29 DIAGNOSIS — Z29.9 ENCOUNTER FOR PROPHYLACTIC MEASURES, UNSPECIFIED: ICD-10-CM

## 2025-08-29 DIAGNOSIS — Z90.710 ACQUIRED ABSENCE OF BOTH CERVIX AND UTERUS: Chronic | ICD-10-CM

## 2025-08-29 DIAGNOSIS — R74.01 ELEVATION OF LEVELS OF LIVER TRANSAMINASE LEVELS: ICD-10-CM

## 2025-08-29 DIAGNOSIS — I10 ESSENTIAL (PRIMARY) HYPERTENSION: ICD-10-CM

## 2025-08-29 LAB
ALBUMIN SERPL ELPH-MCNC: 2.9 G/DL — LOW (ref 3.3–5)
ALP SERPL-CCNC: 91 U/L — SIGNIFICANT CHANGE UP (ref 40–120)
ALT FLD-CCNC: 85 U/L — HIGH (ref 12–78)
ANION GAP SERPL CALC-SCNC: 9 MMOL/L — SIGNIFICANT CHANGE UP (ref 5–17)
AST SERPL-CCNC: 113 U/L — HIGH (ref 15–37)
BASOPHILS # BLD AUTO: 0.03 K/UL — SIGNIFICANT CHANGE UP (ref 0–0.2)
BASOPHILS NFR BLD AUTO: 0.3 % — SIGNIFICANT CHANGE UP (ref 0–2)
BILIRUB SERPL-MCNC: 0.5 MG/DL — SIGNIFICANT CHANGE UP (ref 0.2–1.2)
BUN SERPL-MCNC: 20 MG/DL — SIGNIFICANT CHANGE UP (ref 7–23)
CALCIUM SERPL-MCNC: 8.4 MG/DL — LOW (ref 8.5–10.1)
CHLORIDE SERPL-SCNC: 101 MMOL/L — SIGNIFICANT CHANGE UP (ref 96–108)
CO2 SERPL-SCNC: 30 MMOL/L — SIGNIFICANT CHANGE UP (ref 22–31)
CREAT SERPL-MCNC: 4.38 MG/DL — HIGH (ref 0.5–1.3)
EGFR: 9 ML/MIN/1.73M2 — LOW
EGFR: 9 ML/MIN/1.73M2 — LOW
EOSINOPHIL # BLD AUTO: 0.06 K/UL — SIGNIFICANT CHANGE UP (ref 0–0.5)
EOSINOPHIL NFR BLD AUTO: 0.6 % — SIGNIFICANT CHANGE UP (ref 0–6)
GLUCOSE BLDC GLUCOMTR-MCNC: 129 MG/DL — HIGH (ref 70–99)
GLUCOSE SERPL-MCNC: 112 MG/DL — HIGH (ref 70–99)
HCT VFR BLD CALC: 33 % — LOW (ref 34.5–45)
HGB BLD-MCNC: 10.4 G/DL — LOW (ref 11.5–15.5)
IMM GRANULOCYTES NFR BLD AUTO: 0.4 % — SIGNIFICANT CHANGE UP (ref 0–0.9)
LYMPHOCYTES # BLD AUTO: 1.09 K/UL — SIGNIFICANT CHANGE UP (ref 1–3.3)
LYMPHOCYTES # BLD AUTO: 10.3 % — LOW (ref 13–44)
MAGNESIUM SERPL-MCNC: 1.9 MG/DL — SIGNIFICANT CHANGE UP (ref 1.6–2.6)
MCHC RBC-ENTMCNC: 31.2 PG — SIGNIFICANT CHANGE UP (ref 27–34)
MCHC RBC-ENTMCNC: 31.5 G/DL — LOW (ref 32–36)
MCV RBC AUTO: 99.1 FL — SIGNIFICANT CHANGE UP (ref 80–100)
MONOCYTES # BLD AUTO: 0.92 K/UL — HIGH (ref 0–0.9)
MONOCYTES NFR BLD AUTO: 8.7 % — SIGNIFICANT CHANGE UP (ref 2–14)
NEUTROPHILS # BLD AUTO: 8.48 K/UL — HIGH (ref 1.8–7.4)
NEUTROPHILS NFR BLD AUTO: 79.7 % — HIGH (ref 43–77)
NRBC BLD AUTO-RTO: 0 /100 WBCS — SIGNIFICANT CHANGE UP (ref 0–0)
NT-PROBNP SERPL-SCNC: HIGH PG/ML (ref 0–450)
PLATELET # BLD AUTO: 78 K/UL — LOW (ref 150–400)
POTASSIUM SERPL-MCNC: 3.9 MMOL/L — SIGNIFICANT CHANGE UP (ref 3.5–5.3)
POTASSIUM SERPL-SCNC: 3.9 MMOL/L — SIGNIFICANT CHANGE UP (ref 3.5–5.3)
PROT SERPL-MCNC: 6.9 GM/DL — SIGNIFICANT CHANGE UP (ref 6–8.3)
RBC # BLD: 3.33 M/UL — LOW (ref 3.8–5.2)
RBC # FLD: 17.6 % — HIGH (ref 10.3–14.5)
SODIUM SERPL-SCNC: 140 MMOL/L — SIGNIFICANT CHANGE UP (ref 135–145)
TROPONIN I, HIGH SENSITIVITY RESULT: 224.5 NG/L — HIGH
TROPONIN I, HIGH SENSITIVITY RESULT: 252.1 NG/L — HIGH
WBC # BLD: 10.62 K/UL — HIGH (ref 3.8–10.5)
WBC # FLD AUTO: 10.62 K/UL — HIGH (ref 3.8–10.5)

## 2025-08-29 PROCEDURE — 99285 EMERGENCY DEPT VISIT HI MDM: CPT

## 2025-08-29 PROCEDURE — 71046 X-RAY EXAM CHEST 2 VIEWS: CPT | Mod: 26

## 2025-08-29 PROCEDURE — 99232 SBSQ HOSP IP/OBS MODERATE 35: CPT

## 2025-08-29 PROCEDURE — 93010 ELECTROCARDIOGRAM REPORT: CPT

## 2025-08-29 PROCEDURE — 99223 1ST HOSP IP/OBS HIGH 75: CPT

## 2025-08-29 RX ORDER — OLANZAPINE 10 MG/1
2.5 TABLET ORAL ONCE
Refills: 0 | Status: COMPLETED | OUTPATIENT
Start: 2025-08-29 | End: 2025-08-29

## 2025-08-29 RX ORDER — LEVALBUTEROL HYDROCHLORIDE 1.25 MG/3ML
0.63 SOLUTION RESPIRATORY (INHALATION) ONCE
Refills: 0 | Status: COMPLETED | OUTPATIENT
Start: 2025-08-29 | End: 2025-08-29

## 2025-08-29 RX ORDER — ASPIRIN 325 MG
81 TABLET ORAL DAILY
Refills: 0 | Status: DISCONTINUED | OUTPATIENT
Start: 2025-08-29 | End: 2025-09-04

## 2025-08-29 RX ORDER — METOPROLOL SUCCINATE 50 MG/1
50 TABLET, EXTENDED RELEASE ORAL DAILY
Refills: 0 | Status: DISCONTINUED | OUTPATIENT
Start: 2025-08-29 | End: 2025-09-02

## 2025-08-29 RX ORDER — ATORVASTATIN CALCIUM 80 MG/1
20 TABLET, FILM COATED ORAL AT BEDTIME
Refills: 0 | Status: DISCONTINUED | OUTPATIENT
Start: 2025-08-29 | End: 2025-09-04

## 2025-08-29 RX ORDER — MELATONIN 5 MG
3 TABLET ORAL AT BEDTIME
Refills: 0 | Status: DISCONTINUED | OUTPATIENT
Start: 2025-08-29 | End: 2025-09-04

## 2025-08-29 RX ORDER — NIFEDIPINE 30 MG
90 TABLET, EXTENDED RELEASE 24 HR ORAL DAILY
Refills: 0 | Status: DISCONTINUED | OUTPATIENT
Start: 2025-08-29 | End: 2025-09-04

## 2025-08-29 RX ORDER — ACETAMINOPHEN 500 MG/5ML
650 LIQUID (ML) ORAL EVERY 6 HOURS
Refills: 0 | Status: DISCONTINUED | OUTPATIENT
Start: 2025-08-29 | End: 2025-09-04

## 2025-08-29 RX ORDER — SEVELAMER HYDROCHLORIDE 800 MG/1
800 TABLET ORAL
Refills: 0 | Status: DISCONTINUED | OUTPATIENT
Start: 2025-08-29 | End: 2025-09-04

## 2025-08-29 RX ORDER — FUROSEMIDE 10 MG/ML
80 INJECTION INTRAMUSCULAR; INTRAVENOUS ONCE
Refills: 0 | Status: COMPLETED | OUTPATIENT
Start: 2025-08-29 | End: 2025-08-29

## 2025-08-29 RX ORDER — ONDANSETRON HCL/PF 4 MG/2 ML
4 VIAL (ML) INJECTION EVERY 8 HOURS
Refills: 0 | Status: DISCONTINUED | OUTPATIENT
Start: 2025-08-29 | End: 2025-09-04

## 2025-08-29 RX ORDER — MAGNESIUM, ALUMINUM HYDROXIDE 200-200 MG
30 TABLET,CHEWABLE ORAL EVERY 4 HOURS
Refills: 0 | Status: DISCONTINUED | OUTPATIENT
Start: 2025-08-29 | End: 2025-09-04

## 2025-08-29 RX ADMIN — ATORVASTATIN CALCIUM 20 MILLIGRAM(S): 80 TABLET, FILM COATED ORAL at 21:49

## 2025-08-29 RX ADMIN — FUROSEMIDE 80 MILLIGRAM(S): 10 INJECTION INTRAMUSCULAR; INTRAVENOUS at 23:19

## 2025-08-29 RX ADMIN — LEVALBUTEROL HYDROCHLORIDE 0.63 MILLIGRAM(S): 1.25 SOLUTION RESPIRATORY (INHALATION) at 23:48

## 2025-08-29 RX ADMIN — Medication 500 MILLILITER(S): at 16:17

## 2025-08-29 RX ADMIN — Medication 10 MILLIGRAM(S): at 23:55

## 2025-08-29 RX ADMIN — OLANZAPINE 2.5 MILLIGRAM(S): 10 TABLET ORAL at 23:55

## 2025-08-30 LAB
ALBUMIN SERPL ELPH-MCNC: 2.8 G/DL — LOW (ref 3.3–5)
ALP SERPL-CCNC: 81 U/L — SIGNIFICANT CHANGE UP (ref 40–120)
ALT FLD-CCNC: 58 U/L — SIGNIFICANT CHANGE UP (ref 12–78)
ANION GAP SERPL CALC-SCNC: 7 MMOL/L — SIGNIFICANT CHANGE UP (ref 5–17)
AST SERPL-CCNC: 48 U/L — HIGH (ref 15–37)
BASE EXCESS BLDA CALC-SCNC: 3.7 MMOL/L — HIGH (ref -2–3)
BILIRUB SERPL-MCNC: 0.5 MG/DL — SIGNIFICANT CHANGE UP (ref 0.2–1.2)
BLOOD GAS COMMENTS ARTERIAL: SIGNIFICANT CHANGE UP
BUN SERPL-MCNC: 27 MG/DL — HIGH (ref 7–23)
CALCIUM SERPL-MCNC: 8.9 MG/DL — SIGNIFICANT CHANGE UP (ref 8.5–10.1)
CHLORIDE SERPL-SCNC: 104 MMOL/L — SIGNIFICANT CHANGE UP (ref 96–108)
CK MB CFR SERPL CALC: 2.6 NG/ML — SIGNIFICANT CHANGE UP (ref 0.5–3.6)
CO2 BLDA-SCNC: 29 MMOL/L — HIGH (ref 19–24)
CO2 SERPL-SCNC: 29 MMOL/L — SIGNIFICANT CHANGE UP (ref 22–31)
CREAT SERPL-MCNC: 5.38 MG/DL — HIGH (ref 0.5–1.3)
EGFR: 7 ML/MIN/1.73M2 — LOW
EGFR: 7 ML/MIN/1.73M2 — LOW
GAS PNL BLDA: SIGNIFICANT CHANGE UP
GLUCOSE SERPL-MCNC: 95 MG/DL — SIGNIFICANT CHANGE UP (ref 70–99)
HCO3 BLDA-SCNC: 28 MMOL/L — SIGNIFICANT CHANGE UP (ref 21–28)
HCT VFR BLD CALC: 30.5 % — LOW (ref 34.5–45)
HGB BLD-MCNC: 9.5 G/DL — LOW (ref 11.5–15.5)
HOROWITZ INDEX BLDA+IHG-RTO: 0.5 — SIGNIFICANT CHANGE UP
MAGNESIUM SERPL-MCNC: 2 MG/DL — SIGNIFICANT CHANGE UP (ref 1.6–2.6)
MCHC RBC-ENTMCNC: 30.9 PG — SIGNIFICANT CHANGE UP (ref 27–34)
MCHC RBC-ENTMCNC: 31.1 G/DL — LOW (ref 32–36)
MCV RBC AUTO: 99.3 FL — SIGNIFICANT CHANGE UP (ref 80–100)
NRBC BLD AUTO-RTO: 0 /100 WBCS — SIGNIFICANT CHANGE UP (ref 0–0)
PCO2 BLDA: 39 MMHG — SIGNIFICANT CHANGE UP (ref 32–46)
PH BLDA: 7.46 — HIGH (ref 7.35–7.45)
PHOSPHATE SERPL-MCNC: 4.7 MG/DL — HIGH (ref 2.5–4.5)
PLATELET # BLD AUTO: 85 K/UL — LOW (ref 150–400)
PO2 BLDA: 135 MMHG — HIGH (ref 83–108)
POTASSIUM SERPL-MCNC: 3.9 MMOL/L — SIGNIFICANT CHANGE UP (ref 3.5–5.3)
POTASSIUM SERPL-SCNC: 3.9 MMOL/L — SIGNIFICANT CHANGE UP (ref 3.5–5.3)
PROT SERPL-MCNC: 6.4 GM/DL — SIGNIFICANT CHANGE UP (ref 6–8.3)
RBC # BLD: 3.07 M/UL — LOW (ref 3.8–5.2)
RBC # FLD: 17.5 % — HIGH (ref 10.3–14.5)
SAO2 % BLDA: 98 % — SIGNIFICANT CHANGE UP (ref 94–98)
SODIUM SERPL-SCNC: 140 MMOL/L — SIGNIFICANT CHANGE UP (ref 135–145)
TROPONIN I, HIGH SENSITIVITY RESULT: 251 NG/L — HIGH
WBC # BLD: 9.02 K/UL — SIGNIFICANT CHANGE UP (ref 3.8–10.5)
WBC # FLD AUTO: 9.02 K/UL — SIGNIFICANT CHANGE UP (ref 3.8–10.5)

## 2025-08-30 PROCEDURE — 99232 SBSQ HOSP IP/OBS MODERATE 35: CPT

## 2025-08-30 PROCEDURE — 99221 1ST HOSP IP/OBS SF/LOW 40: CPT

## 2025-08-30 PROCEDURE — 95816 EEG AWAKE AND DROWSY: CPT | Mod: 26

## 2025-08-30 RX ORDER — HEPARIN SODIUM 1000 [USP'U]/ML
5000 INJECTION INTRAVENOUS; SUBCUTANEOUS EVERY 12 HOURS
Refills: 0 | Status: DISCONTINUED | OUTPATIENT
Start: 2025-08-30 | End: 2025-09-04

## 2025-08-30 RX ORDER — EPOETIN ALFA 10000 [IU]/ML
10000 SOLUTION INTRAVENOUS; SUBCUTANEOUS
Refills: 0 | Status: DISCONTINUED | OUTPATIENT
Start: 2025-08-30 | End: 2025-09-04

## 2025-08-30 RX ADMIN — ATORVASTATIN CALCIUM 20 MILLIGRAM(S): 80 TABLET, FILM COATED ORAL at 21:05

## 2025-08-30 RX ADMIN — SEVELAMER HYDROCHLORIDE 800 MILLIGRAM(S): 800 TABLET ORAL at 09:03

## 2025-08-30 RX ADMIN — Medication 90 MILLIGRAM(S): at 05:46

## 2025-08-30 RX ADMIN — SEVELAMER HYDROCHLORIDE 800 MILLIGRAM(S): 800 TABLET ORAL at 13:02

## 2025-08-30 RX ADMIN — SEVELAMER HYDROCHLORIDE 800 MILLIGRAM(S): 800 TABLET ORAL at 16:52

## 2025-08-30 RX ADMIN — METOPROLOL SUCCINATE 50 MILLIGRAM(S): 50 TABLET, EXTENDED RELEASE ORAL at 05:47

## 2025-08-30 RX ADMIN — Medication 81 MILLIGRAM(S): at 13:02

## 2025-08-31 LAB
ALBUMIN SERPL ELPH-MCNC: 2.8 G/DL — LOW (ref 3.3–5)
ALP SERPL-CCNC: 75 U/L — SIGNIFICANT CHANGE UP (ref 40–120)
ALT FLD-CCNC: 46 U/L — SIGNIFICANT CHANGE UP (ref 12–78)
ANION GAP SERPL CALC-SCNC: 10 MMOL/L — SIGNIFICANT CHANGE UP (ref 5–17)
AST SERPL-CCNC: 29 U/L — SIGNIFICANT CHANGE UP (ref 15–37)
BILIRUB SERPL-MCNC: 0.5 MG/DL — SIGNIFICANT CHANGE UP (ref 0.2–1.2)
BUN SERPL-MCNC: 46 MG/DL — HIGH (ref 7–23)
CALCIUM SERPL-MCNC: 8.9 MG/DL — SIGNIFICANT CHANGE UP (ref 8.5–10.1)
CHLORIDE SERPL-SCNC: 102 MMOL/L — SIGNIFICANT CHANGE UP (ref 96–108)
CO2 SERPL-SCNC: 27 MMOL/L — SIGNIFICANT CHANGE UP (ref 22–31)
CREAT SERPL-MCNC: 7.77 MG/DL — HIGH (ref 0.5–1.3)
EGFR: 5 ML/MIN/1.73M2 — LOW
EGFR: 5 ML/MIN/1.73M2 — LOW
GLUCOSE SERPL-MCNC: 102 MG/DL — HIGH (ref 70–99)
HCT VFR BLD CALC: 30.8 % — LOW (ref 34.5–45)
HGB BLD-MCNC: 9.5 G/DL — LOW (ref 11.5–15.5)
MAGNESIUM SERPL-MCNC: 2.1 MG/DL — SIGNIFICANT CHANGE UP (ref 1.6–2.6)
MCHC RBC-ENTMCNC: 30.7 PG — SIGNIFICANT CHANGE UP (ref 27–34)
MCHC RBC-ENTMCNC: 30.8 G/DL — LOW (ref 32–36)
MCV RBC AUTO: 99.7 FL — SIGNIFICANT CHANGE UP (ref 80–100)
NRBC BLD AUTO-RTO: 0 /100 WBCS — SIGNIFICANT CHANGE UP (ref 0–0)
PHOSPHATE SERPL-MCNC: 4.8 MG/DL — HIGH (ref 2.5–4.5)
PLATELET # BLD AUTO: 97 K/UL — LOW (ref 150–400)
POTASSIUM SERPL-MCNC: 4.2 MMOL/L — SIGNIFICANT CHANGE UP (ref 3.5–5.3)
POTASSIUM SERPL-SCNC: 4.2 MMOL/L — SIGNIFICANT CHANGE UP (ref 3.5–5.3)
PROT SERPL-MCNC: 6.6 GM/DL — SIGNIFICANT CHANGE UP (ref 6–8.3)
RBC # BLD: 3.09 M/UL — LOW (ref 3.8–5.2)
RBC # FLD: 17.8 % — HIGH (ref 10.3–14.5)
SODIUM SERPL-SCNC: 139 MMOL/L — SIGNIFICANT CHANGE UP (ref 135–145)
WBC # BLD: 7.47 K/UL — SIGNIFICANT CHANGE UP (ref 3.8–10.5)
WBC # FLD AUTO: 7.47 K/UL — SIGNIFICANT CHANGE UP (ref 3.8–10.5)

## 2025-08-31 PROCEDURE — 99233 SBSQ HOSP IP/OBS HIGH 50: CPT

## 2025-08-31 PROCEDURE — 99232 SBSQ HOSP IP/OBS MODERATE 35: CPT

## 2025-08-31 RX ADMIN — HEPARIN SODIUM 5000 UNIT(S): 1000 INJECTION INTRAVENOUS; SUBCUTANEOUS at 18:25

## 2025-08-31 RX ADMIN — SEVELAMER HYDROCHLORIDE 800 MILLIGRAM(S): 800 TABLET ORAL at 18:24

## 2025-08-31 RX ADMIN — Medication 3 MILLIGRAM(S): at 21:21

## 2025-08-31 RX ADMIN — SEVELAMER HYDROCHLORIDE 800 MILLIGRAM(S): 800 TABLET ORAL at 12:17

## 2025-08-31 RX ADMIN — Medication 90 MILLIGRAM(S): at 05:29

## 2025-08-31 RX ADMIN — HEPARIN SODIUM 5000 UNIT(S): 1000 INJECTION INTRAVENOUS; SUBCUTANEOUS at 05:29

## 2025-08-31 RX ADMIN — Medication 81 MILLIGRAM(S): at 12:17

## 2025-08-31 RX ADMIN — METOPROLOL SUCCINATE 50 MILLIGRAM(S): 50 TABLET, EXTENDED RELEASE ORAL at 05:29

## 2025-08-31 RX ADMIN — SEVELAMER HYDROCHLORIDE 800 MILLIGRAM(S): 800 TABLET ORAL at 09:22

## 2025-08-31 RX ADMIN — ATORVASTATIN CALCIUM 20 MILLIGRAM(S): 80 TABLET, FILM COATED ORAL at 21:21

## 2025-09-01 LAB
ALBUMIN SERPL ELPH-MCNC: 3.3 G/DL — SIGNIFICANT CHANGE UP (ref 3.3–5)
ALP SERPL-CCNC: 93 U/L — SIGNIFICANT CHANGE UP (ref 40–120)
ALT FLD-CCNC: 43 U/L — SIGNIFICANT CHANGE UP (ref 12–78)
ANION GAP SERPL CALC-SCNC: 10 MMOL/L — SIGNIFICANT CHANGE UP (ref 5–17)
AST SERPL-CCNC: 18 U/L — SIGNIFICANT CHANGE UP (ref 15–37)
BASE EXCESS BLDA CALC-SCNC: 1.7 MMOL/L — SIGNIFICANT CHANGE UP (ref -2–3)
BILIRUB SERPL-MCNC: 0.5 MG/DL — SIGNIFICANT CHANGE UP (ref 0.2–1.2)
BLOOD GAS COMMENTS ARTERIAL: SIGNIFICANT CHANGE UP
BUN SERPL-MCNC: 69 MG/DL — HIGH (ref 7–23)
CALCIUM SERPL-MCNC: 9.3 MG/DL — SIGNIFICANT CHANGE UP (ref 8.5–10.1)
CHLORIDE SERPL-SCNC: 103 MMOL/L — SIGNIFICANT CHANGE UP (ref 96–108)
CO2 BLDA-SCNC: 29 MMOL/L — HIGH (ref 19–24)
CO2 SERPL-SCNC: 27 MMOL/L — SIGNIFICANT CHANGE UP (ref 22–31)
CREAT SERPL-MCNC: 9.44 MG/DL — HIGH (ref 0.5–1.3)
EGFR: 4 ML/MIN/1.73M2 — LOW
EGFR: 4 ML/MIN/1.73M2 — LOW
GAS PNL BLDA: SIGNIFICANT CHANGE UP
GAS PNL BLDA: SIGNIFICANT CHANGE UP
GLUCOSE BLDC GLUCOMTR-MCNC: 148 MG/DL — HIGH (ref 70–99)
GLUCOSE SERPL-MCNC: 171 MG/DL — HIGH (ref 70–99)
HCO3 BLDA-SCNC: 27 MMOL/L — SIGNIFICANT CHANGE UP (ref 21–28)
HCT VFR BLD CALC: 34.5 % — SIGNIFICANT CHANGE UP (ref 34.5–45)
HGB BLD-MCNC: 10.7 G/DL — LOW (ref 11.5–15.5)
HOROWITZ INDEX BLDA+IHG-RTO: 40 — SIGNIFICANT CHANGE UP
MAGNESIUM SERPL-MCNC: 2.4 MG/DL — SIGNIFICANT CHANGE UP (ref 1.6–2.6)
MCHC RBC-ENTMCNC: 31 G/DL — LOW (ref 32–36)
MCHC RBC-ENTMCNC: 31.2 PG — SIGNIFICANT CHANGE UP (ref 27–34)
MCV RBC AUTO: 100.6 FL — HIGH (ref 80–100)
NRBC BLD AUTO-RTO: 0 /100 WBCS — SIGNIFICANT CHANGE UP (ref 0–0)
PCO2 BLDA: 45 MMHG — SIGNIFICANT CHANGE UP (ref 32–46)
PH BLDA: 7.39 — SIGNIFICANT CHANGE UP (ref 7.35–7.45)
PHOSPHATE SERPL-MCNC: 5 MG/DL — HIGH (ref 2.5–4.5)
PLATELET # BLD AUTO: 152 K/UL — SIGNIFICANT CHANGE UP (ref 150–400)
PO2 BLDA: 68 MMHG — LOW (ref 83–108)
POTASSIUM SERPL-MCNC: 4.2 MMOL/L — SIGNIFICANT CHANGE UP (ref 3.5–5.3)
POTASSIUM SERPL-SCNC: 4.2 MMOL/L — SIGNIFICANT CHANGE UP (ref 3.5–5.3)
PROT SERPL-MCNC: 7.6 GM/DL — SIGNIFICANT CHANGE UP (ref 6–8.3)
RBC # BLD: 3.43 M/UL — LOW (ref 3.8–5.2)
RBC # FLD: 17.5 % — HIGH (ref 10.3–14.5)
SAO2 % BLDA: 94.7 % — SIGNIFICANT CHANGE UP (ref 94–98)
SODIUM SERPL-SCNC: 140 MMOL/L — SIGNIFICANT CHANGE UP (ref 135–145)
WBC # BLD: 12.31 K/UL — HIGH (ref 3.8–10.5)
WBC # FLD AUTO: 12.31 K/UL — HIGH (ref 3.8–10.5)

## 2025-09-01 PROCEDURE — 99232 SBSQ HOSP IP/OBS MODERATE 35: CPT

## 2025-09-01 PROCEDURE — 99233 SBSQ HOSP IP/OBS HIGH 50: CPT

## 2025-09-01 PROCEDURE — 71045 X-RAY EXAM CHEST 1 VIEW: CPT | Mod: 26

## 2025-09-01 RX ORDER — OLANZAPINE 10 MG/1
2.5 TABLET ORAL ONCE
Refills: 0 | Status: COMPLETED | OUTPATIENT
Start: 2025-09-01 | End: 2025-09-01

## 2025-09-01 RX ADMIN — SEVELAMER HYDROCHLORIDE 800 MILLIGRAM(S): 800 TABLET ORAL at 17:02

## 2025-09-01 RX ADMIN — ATORVASTATIN CALCIUM 20 MILLIGRAM(S): 80 TABLET, FILM COATED ORAL at 21:37

## 2025-09-01 RX ADMIN — HEPARIN SODIUM 5000 UNIT(S): 1000 INJECTION INTRAVENOUS; SUBCUTANEOUS at 05:44

## 2025-09-01 RX ADMIN — OLANZAPINE 2.5 MILLIGRAM(S): 10 TABLET ORAL at 05:25

## 2025-09-01 RX ADMIN — METOPROLOL SUCCINATE 50 MILLIGRAM(S): 50 TABLET, EXTENDED RELEASE ORAL at 05:46

## 2025-09-01 RX ADMIN — EPOETIN ALFA 10000 UNIT(S): 10000 SOLUTION INTRAVENOUS; SUBCUTANEOUS at 11:07

## 2025-09-01 RX ADMIN — Medication 90 MILLIGRAM(S): at 05:46

## 2025-09-01 RX ADMIN — HEPARIN SODIUM 5000 UNIT(S): 1000 INJECTION INTRAVENOUS; SUBCUTANEOUS at 17:03

## 2025-09-01 RX ADMIN — Medication 81 MILLIGRAM(S): at 14:40

## 2025-09-02 ENCOUNTER — RESULT REVIEW (OUTPATIENT)
Age: 85
End: 2025-09-02

## 2025-09-02 LAB
ANION GAP SERPL CALC-SCNC: 8 MMOL/L — SIGNIFICANT CHANGE UP (ref 5–17)
BUN SERPL-MCNC: 41 MG/DL — HIGH (ref 7–23)
CALCIUM SERPL-MCNC: 9.2 MG/DL — SIGNIFICANT CHANGE UP (ref 8.5–10.1)
CHLORIDE SERPL-SCNC: 102 MMOL/L — SIGNIFICANT CHANGE UP (ref 96–108)
CO2 SERPL-SCNC: 29 MMOL/L — SIGNIFICANT CHANGE UP (ref 22–31)
CREAT SERPL-MCNC: 6.84 MG/DL — HIGH (ref 0.5–1.3)
EGFR: 5 ML/MIN/1.73M2 — LOW
EGFR: 5 ML/MIN/1.73M2 — LOW
GLUCOSE SERPL-MCNC: 128 MG/DL — HIGH (ref 70–99)
HCT VFR BLD CALC: 32.7 % — LOW (ref 34.5–45)
HGB BLD-MCNC: 10.1 G/DL — LOW (ref 11.5–15.5)
MAGNESIUM SERPL-MCNC: 2.3 MG/DL — SIGNIFICANT CHANGE UP (ref 1.6–2.6)
MCHC RBC-ENTMCNC: 30.9 G/DL — LOW (ref 32–36)
MCHC RBC-ENTMCNC: 30.9 PG — SIGNIFICANT CHANGE UP (ref 27–34)
MCV RBC AUTO: 100 FL — SIGNIFICANT CHANGE UP (ref 80–100)
NRBC BLD AUTO-RTO: 0 /100 WBCS — SIGNIFICANT CHANGE UP (ref 0–0)
PHOSPHATE SERPL-MCNC: 5.1 MG/DL — HIGH (ref 2.5–4.5)
PLATELET # BLD AUTO: 162 K/UL — SIGNIFICANT CHANGE UP (ref 150–400)
POTASSIUM SERPL-MCNC: 4.1 MMOL/L — SIGNIFICANT CHANGE UP (ref 3.5–5.3)
POTASSIUM SERPL-SCNC: 4.1 MMOL/L — SIGNIFICANT CHANGE UP (ref 3.5–5.3)
RBC # BLD: 3.27 M/UL — LOW (ref 3.8–5.2)
RBC # FLD: 17.5 % — HIGH (ref 10.3–14.5)
SODIUM SERPL-SCNC: 139 MMOL/L — SIGNIFICANT CHANGE UP (ref 135–145)
WBC # BLD: 8.52 K/UL — SIGNIFICANT CHANGE UP (ref 3.8–10.5)
WBC # FLD AUTO: 8.52 K/UL — SIGNIFICANT CHANGE UP (ref 3.8–10.5)

## 2025-09-02 PROCEDURE — 99233 SBSQ HOSP IP/OBS HIGH 50: CPT

## 2025-09-02 PROCEDURE — 93306 TTE W/DOPPLER COMPLETE: CPT | Mod: 26

## 2025-09-02 RX ORDER — METOPROLOL SUCCINATE 50 MG/1
100 TABLET, EXTENDED RELEASE ORAL DAILY
Refills: 0 | Status: DISCONTINUED | OUTPATIENT
Start: 2025-09-02 | End: 2025-09-04

## 2025-09-02 RX ADMIN — SEVELAMER HYDROCHLORIDE 800 MILLIGRAM(S): 800 TABLET ORAL at 17:58

## 2025-09-02 RX ADMIN — HEPARIN SODIUM 5000 UNIT(S): 1000 INJECTION INTRAVENOUS; SUBCUTANEOUS at 05:19

## 2025-09-02 RX ADMIN — Medication 90 MILLIGRAM(S): at 05:19

## 2025-09-02 RX ADMIN — METOPROLOL SUCCINATE 50 MILLIGRAM(S): 50 TABLET, EXTENDED RELEASE ORAL at 05:19

## 2025-09-02 RX ADMIN — Medication 81 MILLIGRAM(S): at 11:32

## 2025-09-02 RX ADMIN — HEPARIN SODIUM 5000 UNIT(S): 1000 INJECTION INTRAVENOUS; SUBCUTANEOUS at 17:58

## 2025-09-02 RX ADMIN — ATORVASTATIN CALCIUM 20 MILLIGRAM(S): 80 TABLET, FILM COATED ORAL at 21:19

## 2025-09-02 RX ADMIN — SEVELAMER HYDROCHLORIDE 800 MILLIGRAM(S): 800 TABLET ORAL at 11:32

## 2025-09-02 RX ADMIN — SEVELAMER HYDROCHLORIDE 800 MILLIGRAM(S): 800 TABLET ORAL at 08:39

## 2025-09-03 ENCOUNTER — TRANSCRIPTION ENCOUNTER (OUTPATIENT)
Age: 85
End: 2025-09-03

## 2025-09-03 LAB
ANION GAP SERPL CALC-SCNC: 11 MMOL/L — SIGNIFICANT CHANGE UP (ref 5–17)
BUN SERPL-MCNC: 71 MG/DL — HIGH (ref 7–23)
CALCIUM SERPL-MCNC: 9.1 MG/DL — SIGNIFICANT CHANGE UP (ref 8.5–10.1)
CHLORIDE SERPL-SCNC: 101 MMOL/L — SIGNIFICANT CHANGE UP (ref 96–108)
CO2 SERPL-SCNC: 27 MMOL/L — SIGNIFICANT CHANGE UP (ref 22–31)
CREAT SERPL-MCNC: 9.41 MG/DL — HIGH (ref 0.5–1.3)
EGFR: 4 ML/MIN/1.73M2 — LOW
EGFR: 4 ML/MIN/1.73M2 — LOW
GLUCOSE SERPL-MCNC: 125 MG/DL — HIGH (ref 70–99)
HCT VFR BLD CALC: 30.2 % — LOW (ref 34.5–45)
HGB BLD-MCNC: 9.6 G/DL — LOW (ref 11.5–15.5)
MAGNESIUM SERPL-MCNC: 2.3 MG/DL — SIGNIFICANT CHANGE UP (ref 1.6–2.6)
MCHC RBC-ENTMCNC: 31.2 PG — SIGNIFICANT CHANGE UP (ref 27–34)
MCHC RBC-ENTMCNC: 31.8 G/DL — LOW (ref 32–36)
MCV RBC AUTO: 98.1 FL — SIGNIFICANT CHANGE UP (ref 80–100)
MRSA PCR RESULT.: SIGNIFICANT CHANGE UP
NRBC BLD AUTO-RTO: 0 /100 WBCS — SIGNIFICANT CHANGE UP (ref 0–0)
PHOSPHATE SERPL-MCNC: 5.2 MG/DL — HIGH (ref 2.5–4.5)
PLATELET # BLD AUTO: 216 K/UL — SIGNIFICANT CHANGE UP (ref 150–400)
POTASSIUM SERPL-MCNC: 4.7 MMOL/L — SIGNIFICANT CHANGE UP (ref 3.5–5.3)
POTASSIUM SERPL-SCNC: 4.7 MMOL/L — SIGNIFICANT CHANGE UP (ref 3.5–5.3)
RBC # BLD: 3.08 M/UL — LOW (ref 3.8–5.2)
RBC # FLD: 17.1 % — HIGH (ref 10.3–14.5)
S AUREUS DNA NOSE QL NAA+PROBE: SIGNIFICANT CHANGE UP
SODIUM SERPL-SCNC: 139 MMOL/L — SIGNIFICANT CHANGE UP (ref 135–145)
WBC # BLD: 8.26 K/UL — SIGNIFICANT CHANGE UP (ref 3.8–10.5)
WBC # FLD AUTO: 8.26 K/UL — SIGNIFICANT CHANGE UP (ref 3.8–10.5)

## 2025-09-03 PROCEDURE — 99239 HOSP IP/OBS DSCHRG MGMT >30: CPT

## 2025-09-03 RX ORDER — METOPROLOL SUCCINATE 50 MG/1
1 TABLET, EXTENDED RELEASE ORAL
Qty: 0 | Refills: 0 | DISCHARGE
Start: 2025-09-03

## 2025-09-03 RX ORDER — METOPROLOL SUCCINATE 50 MG/1
0 TABLET, EXTENDED RELEASE ORAL
Qty: 0 | Refills: 0 | DISCHARGE

## 2025-09-03 RX ORDER — SUCROFERRIC OXYHYDROXIDE 500 MG/1
0 TABLET, CHEWABLE ORAL
Qty: 0 | Refills: 7 | DISCHARGE

## 2025-09-03 RX ORDER — SEVELAMER HYDROCHLORIDE 800 MG/1
1 TABLET ORAL
Qty: 90 | Refills: 0
Start: 2025-09-03 | End: 2025-10-02

## 2025-09-03 RX ORDER — NIFEDIPINE 30 MG
1 TABLET, EXTENDED RELEASE 24 HR ORAL
Qty: 30 | Refills: 0
Start: 2025-09-03 | End: 2025-10-02

## 2025-09-03 RX ORDER — METOPROLOL SUCCINATE 50 MG/1
1 TABLET, EXTENDED RELEASE ORAL
Qty: 30 | Refills: 0
Start: 2025-09-03 | End: 2025-10-02

## 2025-09-03 RX ORDER — NIFEDIPINE 30 MG
1 TABLET, EXTENDED RELEASE 24 HR ORAL
Qty: 0 | Refills: 0 | DISCHARGE
Start: 2025-09-03

## 2025-09-03 RX ORDER — ASPIRIN 325 MG
1 TABLET ORAL
Qty: 0 | Refills: 0 | DISCHARGE
Start: 2025-09-03

## 2025-09-03 RX ORDER — ATORVASTATIN CALCIUM 80 MG/1
1 TABLET, FILM COATED ORAL
Qty: 0 | Refills: 0 | DISCHARGE
Start: 2025-09-03

## 2025-09-03 RX ADMIN — SEVELAMER HYDROCHLORIDE 800 MILLIGRAM(S): 800 TABLET ORAL at 18:19

## 2025-09-03 RX ADMIN — METOPROLOL SUCCINATE 100 MILLIGRAM(S): 50 TABLET, EXTENDED RELEASE ORAL at 05:10

## 2025-09-03 RX ADMIN — SEVELAMER HYDROCHLORIDE 800 MILLIGRAM(S): 800 TABLET ORAL at 11:42

## 2025-09-03 RX ADMIN — Medication 90 MILLIGRAM(S): at 05:10

## 2025-09-03 RX ADMIN — EPOETIN ALFA 10000 UNIT(S): 10000 SOLUTION INTRAVENOUS; SUBCUTANEOUS at 14:12

## 2025-09-03 RX ADMIN — Medication 1 APPLICATION(S): at 11:43

## 2025-09-03 RX ADMIN — SEVELAMER HYDROCHLORIDE 800 MILLIGRAM(S): 800 TABLET ORAL at 07:54

## 2025-09-03 RX ADMIN — Medication 81 MILLIGRAM(S): at 11:42

## 2025-09-03 RX ADMIN — HEPARIN SODIUM 5000 UNIT(S): 1000 INJECTION INTRAVENOUS; SUBCUTANEOUS at 05:10

## 2025-09-03 RX ADMIN — HEPARIN SODIUM 5000 UNIT(S): 1000 INJECTION INTRAVENOUS; SUBCUTANEOUS at 18:19

## 2025-09-03 RX ADMIN — ATORVASTATIN CALCIUM 20 MILLIGRAM(S): 80 TABLET, FILM COATED ORAL at 21:36

## 2025-09-04 VITALS
RESPIRATION RATE: 20 BRPM | SYSTOLIC BLOOD PRESSURE: 129 MMHG | HEART RATE: 85 BPM | OXYGEN SATURATION: 97 % | DIASTOLIC BLOOD PRESSURE: 69 MMHG | TEMPERATURE: 98 F

## 2025-09-04 PROCEDURE — 99232 SBSQ HOSP IP/OBS MODERATE 35: CPT

## 2025-09-04 RX ADMIN — SEVELAMER HYDROCHLORIDE 800 MILLIGRAM(S): 800 TABLET ORAL at 17:38

## 2025-09-04 RX ADMIN — Medication 1 APPLICATION(S): at 12:30

## 2025-09-04 RX ADMIN — SEVELAMER HYDROCHLORIDE 800 MILLIGRAM(S): 800 TABLET ORAL at 12:30

## 2025-09-04 RX ADMIN — Medication 90 MILLIGRAM(S): at 05:08

## 2025-09-04 RX ADMIN — METOPROLOL SUCCINATE 100 MILLIGRAM(S): 50 TABLET, EXTENDED RELEASE ORAL at 05:08

## 2025-09-04 RX ADMIN — HEPARIN SODIUM 5000 UNIT(S): 1000 INJECTION INTRAVENOUS; SUBCUTANEOUS at 05:08

## 2025-09-04 RX ADMIN — SEVELAMER HYDROCHLORIDE 800 MILLIGRAM(S): 800 TABLET ORAL at 09:55

## 2025-09-04 RX ADMIN — HEPARIN SODIUM 5000 UNIT(S): 1000 INJECTION INTRAVENOUS; SUBCUTANEOUS at 17:39

## 2025-09-04 RX ADMIN — Medication 81 MILLIGRAM(S): at 12:30

## 2025-09-10 DIAGNOSIS — M19.90 UNSPECIFIED OSTEOARTHRITIS, UNSPECIFIED SITE: ICD-10-CM

## 2025-09-10 DIAGNOSIS — Z79.84 LONG TERM (CURRENT) USE OF ORAL HYPOGLYCEMIC DRUGS: ICD-10-CM

## 2025-09-10 DIAGNOSIS — R55 SYNCOPE AND COLLAPSE: ICD-10-CM

## 2025-09-10 DIAGNOSIS — I12.0 HYPERTENSIVE CHRONIC KIDNEY DISEASE WITH STAGE 5 CHRONIC KIDNEY DISEASE OR END STAGE RENAL DISEASE: ICD-10-CM

## 2025-09-10 DIAGNOSIS — I27.20 PULMONARY HYPERTENSION, UNSPECIFIED: ICD-10-CM

## 2025-09-10 DIAGNOSIS — M10.9 GOUT, UNSPECIFIED: ICD-10-CM

## 2025-09-10 DIAGNOSIS — Z79.82 LONG TERM (CURRENT) USE OF ASPIRIN: ICD-10-CM

## 2025-09-10 DIAGNOSIS — Z99.2 DEPENDENCE ON RENAL DIALYSIS: ICD-10-CM

## 2025-09-10 DIAGNOSIS — I25.119 ATHEROSCLEROTIC HEART DISEASE OF NATIVE CORONARY ARTERY WITH UNSPECIFIED ANGINA PECTORIS: ICD-10-CM

## 2025-09-10 DIAGNOSIS — I35.0 NONRHEUMATIC AORTIC (VALVE) STENOSIS: ICD-10-CM

## 2025-09-10 DIAGNOSIS — J96.21 ACUTE AND CHRONIC RESPIRATORY FAILURE WITH HYPOXIA: ICD-10-CM

## 2025-09-10 DIAGNOSIS — Z90.710 ACQUIRED ABSENCE OF BOTH CERVIX AND UTERUS: ICD-10-CM

## 2025-09-10 DIAGNOSIS — D63.1 ANEMIA IN CHRONIC KIDNEY DISEASE: ICD-10-CM

## 2025-09-10 DIAGNOSIS — Z79.899 OTHER LONG TERM (CURRENT) DRUG THERAPY: ICD-10-CM

## 2025-09-10 DIAGNOSIS — E44.0 MODERATE PROTEIN-CALORIE MALNUTRITION: ICD-10-CM

## 2025-09-10 DIAGNOSIS — Z98.42 CATARACT EXTRACTION STATUS, LEFT EYE: ICD-10-CM

## 2025-09-10 DIAGNOSIS — Z95.811 PRESENCE OF HEART ASSIST DEVICE: ICD-10-CM

## 2025-09-10 DIAGNOSIS — R74.01 ELEVATION OF LEVELS OF LIVER TRANSAMINASE LEVELS: ICD-10-CM

## 2025-09-10 DIAGNOSIS — Z95.2 PRESENCE OF PROSTHETIC HEART VALVE: ICD-10-CM

## 2025-09-10 DIAGNOSIS — E11.319 TYPE 2 DIABETES MELLITUS WITH UNSPECIFIED DIABETIC RETINOPATHY WITHOUT MACULAR EDEMA: ICD-10-CM

## 2025-09-10 DIAGNOSIS — R05.9 COUGH, UNSPECIFIED: ICD-10-CM

## 2025-09-10 DIAGNOSIS — E11.22 TYPE 2 DIABETES MELLITUS WITH DIABETIC CHRONIC KIDNEY DISEASE: ICD-10-CM

## 2025-09-10 DIAGNOSIS — Z98.41 CATARACT EXTRACTION STATUS, RIGHT EYE: ICD-10-CM

## 2025-09-10 DIAGNOSIS — E87.70 FLUID OVERLOAD, UNSPECIFIED: ICD-10-CM

## 2025-09-10 DIAGNOSIS — N18.6 END STAGE RENAL DISEASE: ICD-10-CM

## 2025-09-10 DIAGNOSIS — I34.0 NONRHEUMATIC MITRAL (VALVE) INSUFFICIENCY: ICD-10-CM

## 2025-09-10 DIAGNOSIS — I07.1 RHEUMATIC TRICUSPID INSUFFICIENCY: ICD-10-CM

## 2025-09-10 DIAGNOSIS — J81.0 ACUTE PULMONARY EDEMA: ICD-10-CM

## 2025-09-10 DIAGNOSIS — Z79.02 LONG TERM (CURRENT) USE OF ANTITHROMBOTICS/ANTIPLATELETS: ICD-10-CM

## 2025-09-10 DIAGNOSIS — E78.5 HYPERLIPIDEMIA, UNSPECIFIED: ICD-10-CM
